# Patient Record
Sex: FEMALE | Race: WHITE | NOT HISPANIC OR LATINO | Employment: UNEMPLOYED | URBAN - METROPOLITAN AREA
[De-identification: names, ages, dates, MRNs, and addresses within clinical notes are randomized per-mention and may not be internally consistent; named-entity substitution may affect disease eponyms.]

---

## 2017-04-08 DIAGNOSIS — I10 ESSENTIAL HYPERTENSION: ICD-10-CM

## 2017-04-10 RX ORDER — HYDROCHLOROTHIAZIDE 25 MG/1
TABLET ORAL
Qty: 30 TABLET | Refills: 5 | Status: SHIPPED | OUTPATIENT
Start: 2017-04-10 | End: 2017-08-11 | Stop reason: SDUPTHER

## 2017-04-12 ENCOUNTER — LAB VISIT (OUTPATIENT)
Dept: LAB | Facility: HOSPITAL | Age: 47
End: 2017-04-12
Attending: INTERNAL MEDICINE

## 2017-04-12 DIAGNOSIS — I10 ESSENTIAL HYPERTENSION: ICD-10-CM

## 2017-04-12 LAB
ALBUMIN SERPL BCP-MCNC: 4.1 G/DL
ALP SERPL-CCNC: 90 U/L
ALT SERPL W/O P-5'-P-CCNC: 21 U/L
ANION GAP SERPL CALC-SCNC: 9 MMOL/L
AST SERPL-CCNC: 18 U/L
BASOPHILS # BLD AUTO: 0.05 K/UL
BASOPHILS NFR BLD: 0.7 %
BILIRUB SERPL-MCNC: 0.6 MG/DL
BUN SERPL-MCNC: 14 MG/DL
CALCIUM SERPL-MCNC: 9.8 MG/DL
CHLORIDE SERPL-SCNC: 101 MMOL/L
CHOLEST/HDLC SERPL: 3.1 {RATIO}
CO2 SERPL-SCNC: 29 MMOL/L
CREAT SERPL-MCNC: 1 MG/DL
DIFFERENTIAL METHOD: ABNORMAL
EOSINOPHIL # BLD AUTO: 0.2 K/UL
EOSINOPHIL NFR BLD: 2.3 %
ERYTHROCYTE [DISTWIDTH] IN BLOOD BY AUTOMATED COUNT: 14.3 %
EST. GFR  (AFRICAN AMERICAN): >60 ML/MIN/1.73 M^2
EST. GFR  (NON AFRICAN AMERICAN): >60 ML/MIN/1.73 M^2
GLUCOSE SERPL-MCNC: 104 MG/DL
HCT VFR BLD AUTO: 41.8 %
HDL/CHOLESTEROL RATIO: 32.1 %
HDLC SERPL-MCNC: 165 MG/DL
HDLC SERPL-MCNC: 53 MG/DL
HGB BLD-MCNC: 14.2 G/DL
LDLC SERPL CALC-MCNC: 100 MG/DL
LYMPHOCYTES # BLD AUTO: 1.8 K/UL
LYMPHOCYTES NFR BLD: 26.1 %
MCH RBC QN AUTO: 27.8 PG
MCHC RBC AUTO-ENTMCNC: 34 %
MCV RBC AUTO: 82 FL
MONOCYTES # BLD AUTO: 0.5 K/UL
MONOCYTES NFR BLD: 6.9 %
NEUTROPHILS # BLD AUTO: 4.5 K/UL
NEUTROPHILS NFR BLD: 64 %
NONHDLC SERPL-MCNC: 112 MG/DL
PLATELET # BLD AUTO: 400 K/UL
PMV BLD AUTO: 9.9 FL
POTASSIUM SERPL-SCNC: 3.8 MMOL/L
PROT SERPL-MCNC: 8.1 G/DL
RBC # BLD AUTO: 5.11 M/UL
SODIUM SERPL-SCNC: 139 MMOL/L
TRIGL SERPL-MCNC: 60 MG/DL
TSH SERPL DL<=0.005 MIU/L-ACNC: 2.96 UIU/ML
WBC # BLD AUTO: 6.96 K/UL

## 2017-04-12 PROCEDURE — 80053 COMPREHEN METABOLIC PANEL: CPT

## 2017-04-12 PROCEDURE — 84443 ASSAY THYROID STIM HORMONE: CPT

## 2017-04-12 PROCEDURE — 85025 COMPLETE CBC W/AUTO DIFF WBC: CPT

## 2017-04-12 PROCEDURE — 80061 LIPID PANEL: CPT

## 2017-04-12 PROCEDURE — 36415 COLL VENOUS BLD VENIPUNCTURE: CPT

## 2017-04-19 ENCOUNTER — OFFICE VISIT (OUTPATIENT)
Dept: INTERNAL MEDICINE | Facility: CLINIC | Age: 47
End: 2017-04-19
Payer: COMMERCIAL

## 2017-04-19 VITALS
RESPIRATION RATE: 16 BRPM | HEART RATE: 96 BPM | WEIGHT: 213.63 LBS | SYSTOLIC BLOOD PRESSURE: 118 MMHG | HEIGHT: 67 IN | OXYGEN SATURATION: 96 % | DIASTOLIC BLOOD PRESSURE: 82 MMHG | BODY MASS INDEX: 33.53 KG/M2

## 2017-04-19 DIAGNOSIS — N76.3 CHRONIC VULVITIS: ICD-10-CM

## 2017-04-19 DIAGNOSIS — R21 RASH: ICD-10-CM

## 2017-04-19 DIAGNOSIS — I10 ESSENTIAL HYPERTENSION: Primary | ICD-10-CM

## 2017-04-19 DIAGNOSIS — E66.9 OBESITY (BMI 30-39.9): ICD-10-CM

## 2017-04-19 PROCEDURE — 99214 OFFICE O/P EST MOD 30 MIN: CPT | Mod: S$GLB,,, | Performed by: INTERNAL MEDICINE

## 2017-04-19 PROCEDURE — 1160F RVW MEDS BY RX/DR IN RCRD: CPT | Mod: S$GLB,,, | Performed by: INTERNAL MEDICINE

## 2017-04-19 PROCEDURE — 3074F SYST BP LT 130 MM HG: CPT | Mod: S$GLB,,, | Performed by: INTERNAL MEDICINE

## 2017-04-19 PROCEDURE — 99999 PR PBB SHADOW E&M-EST. PATIENT-LVL III: CPT | Mod: PBBFAC,,, | Performed by: INTERNAL MEDICINE

## 2017-04-19 PROCEDURE — 3079F DIAST BP 80-89 MM HG: CPT | Mod: S$GLB,,, | Performed by: INTERNAL MEDICINE

## 2017-04-19 RX ORDER — CLOTRIMAZOLE AND BETAMETHASONE DIPROPIONATE 10; .5 MG/ML; MG/ML
LOTION TOPICAL 2 TIMES DAILY
Qty: 30 ML | Refills: 2 | Status: SHIPPED | OUTPATIENT
Start: 2017-04-19 | End: 2018-05-17 | Stop reason: SDUPTHER

## 2017-04-19 NOTE — PROGRESS NOTES
Subjective:       Patient ID: Rocio Arriaga is a 46 y.o. female.    Chief Complaint: Hypertension (follow up with lab review)    HPI Comments: Rocio Arriaga is a 46 y.o. female who presents forHypertension,  follow up.   Last seen more than 2 yrs ago .  Reviewed  labs      Hypertension   This is a chronic problem. The current episode started more than 1 year ago. The problem is controlled. Pertinent negatives include no blurred vision, chest pain, neck pain, orthopnea, palpitations, PND or shortness of breath. Past treatments include diuretics. The current treatment provides moderate improvement.     Review of Systems   Constitutional: Negative for chills and fever.   HENT: Negative for congestion, hearing loss, sinus pressure and sore throat.    Eyes: Negative for blurred vision and photophobia.   Respiratory: Negative for cough, choking, chest tightness, shortness of breath and wheezing.    Cardiovascular: Negative for chest pain, palpitations, orthopnea and PND.   Gastrointestinal: Negative for blood in stool, nausea and vomiting.   Endocrine: Negative for polyphagia.   Genitourinary: Negative for dysuria and hematuria.        Polydipsia.   Musculoskeletal: Negative for arthralgias, myalgias and neck pain.   Skin: Negative for pallor.   Neurological: Negative for numbness.   Hematological: Does not bruise/bleed easily.   Psychiatric/Behavioral: Negative for suicidal ideas. The patient is not nervous/anxious.        Objective:      Physical Exam   Constitutional: She is oriented to person, place, and time. She appears well-developed and well-nourished.   HENT:   Head: Normocephalic and atraumatic.   Right Ear: External ear normal.   Left Ear: External ear normal.   Mouth/Throat: Oropharynx is clear and moist.   Eyes: Conjunctivae and EOM are normal. Pupils are equal, round, and reactive to light.   Neck: Normal range of motion. Neck supple. No JVD present. No tracheal deviation present. No thyromegaly present.    Cardiovascular: Normal rate, regular rhythm, normal heart sounds and intact distal pulses.    Pulmonary/Chest: Effort normal and breath sounds normal. No respiratory distress. She has no wheezes. She has no rales. She exhibits no tenderness.   Abdominal: Soft. Bowel sounds are normal. She exhibits no distension and no mass. There is no tenderness. There is no rebound and no guarding.   Musculoskeletal: Normal range of motion. She exhibits no edema.   Right ankle swells up    Lymphadenopathy:     She has no cervical adenopathy.   Neurological: She is alert and oriented to person, place, and time. She has normal reflexes. No cranial nerve deficit. She exhibits normal muscle tone. Coordination normal.   Skin: Skin is warm and dry.   Scaly rash scalp ;vulva, abdomen   Asking for Lotrisone lotion      Psychiatric: She has a normal mood and affect.   Nursing note and vitals reviewed.      Assessment:       1. Essential hypertension    2. Obesity (BMI 30-39.9)    3. Chronic vulvitis    4. Rash        Plan:   Rocio was seen today for hypertension.    Diagnoses and all orders for this visit:    Essential hypertension  -     CBC auto differential; Future  -     Comprehensive metabolic panel; Future  -     Lipid panel; Future  -     TSH; Future    Well controlled.  Continue same medication and dose.  1. Keep weight close to ideal body weight.   2.   Avoid high salt foods (olives, pickles, smoked meats, salted potato chips, etc.).   Do not add salt to your food at the table.   Use only small amounts of salt when cooking.   3. Begin an exercise program. Discuss with your doctor what type of exercise program would be best for you. It doesn't have to be difficult. Even brisk walking for 20 minutes three times a week is a good form of exercise.   4. Avoid medicines which contain heart stimulants. This includes many cold and sinus decongestant pills and sprays as well as diet pills. Check the warnings about hypertension on the  label. Stimulants such as amphetamine or cocaine could be lethal for someone with hypertension. Never take these.    Obesity (BMI 30-39.9)    # The patient is asked to make an attempt to improve diet and exercise patterns to aid in medical management of this problem.     # Eat  5 small meals a day.     # Cut out high carbohydrate  foods : bread, rice, pasta, potatoes.     # Exercise/walk 5x/week for at least 30-45  minutes.        Chronic vulvitis  -     clotrimazole-betamethasone (LOTRISONE) lotion; Apply topically 2 (two) times daily.    Rash  -     Ambulatory Referral to Dermatology

## 2017-04-19 NOTE — MR AVS SNAPSHOT
[facility]  [address]  [place] [address]  Phone: [phone]  Fax: [phone]                  Rocio Arriaga   2017 1:45 PM   Office Visit    Description:  Female : 1970   Provider:  Qian Robin MD   Department:  [facility]           Reason for Visit     Hypertension           Diagnoses this Visit        Comments    Essential hypertension    -  Primary     Obesity (BMI 30-39.9)         Chronic vulvitis         Rash                To Do List           Goals (5 Years of Data)     None      Follow-Up and Disposition     Return in about 6 months (around 10/19/2017).       These Medications        Disp Refills Start End    clotrimazole-betamethasone (LOTRISONE) lotion 30 mL 2 2017     Apply topically 2 (two) times daily. - Topical (Top)    Pharmacy: [facility]/pharmacy #[id number]  [place] - [address] [facility] [address]  #: [phone]         [facility]     [facility] -  Assistance  Unless otherwise directed by your provider, please contact [facility], our nurse care line that is available for  assistance.     Registered nurses in the [facility] provide: appointment scheduling, clinical advisement, health education, and other advisory services.  Call: [phone] (toll free)               Medications           Message regarding Medications     Verify the changes and/or additions to your medication regime listed below are the same as discussed with your clinician today.  If any of these changes or additions are incorrect, please notify your healthcare provider.             Verify that the below list of medications is an accurate representation of the medications you are currently taking.  If none reported, the list may be blank. If incorrect, please contact your healthcare provider. Carry this list with you in case of emergency.           Current Medications     clotrimazole-betamethasone (LOTRISONE)  "lotion Apply topically 2 (two) times daily.    hydrochlorothiazide (HYDRODIURIL) 25 MG tablet TAKE 1 TABLET (25 MG TOTAL) BY MOUTH ONCE DAILY.           Clinical Reference Information           Your Vitals Were     BP Pulse Resp Height Weight SpO2    118/82 96 16 5' 7" (1.702 m) 96.9 kg (213 lb 10 oz) 96%    BMI                33.46 kg/m2          Blood Pressure          Most Recent Value    BP  118/82      Allergies as of 4/19/2017     No Known Allergies      Immunizations Administered on Date of Encounter - 4/19/2017     None      Orders Placed During Today's Visit      Normal Orders This Visit    Ambulatory Referral to Dermatology     Future Labs/Procedures Expected by Expires    CBC auto differential  10/16/2017 (Approximate) 4/19/2018    Comprehensive metabolic panel  10/16/2017 (Approximate) 4/19/2018    Lipid panel  10/16/2017 (Approximate) 4/19/2018    TSH  10/16/2017 (Approximate) 4/19/2018      Language Assistance Services     ATTENTION: Language assistance services are available, free of charge. Please call [phone].      ATENCIÓN: Si habla bert, tiene a cox disposición servicios gratuitos de asistencia lingüística. Llame al [phone].     RADHA Ý: N?u b?n nói Ti?ng Vi?t, có các d?ch v? h? tr? ngôn ng? mi?n phí dành cho b?n. G?i s? [phone].         [facility] complies with applicable Federal civil rights laws and does not discriminate on the basis of race, color, national origin, age, disability, or sex.        "

## 2017-08-11 DIAGNOSIS — I10 ESSENTIAL HYPERTENSION: ICD-10-CM

## 2017-08-14 RX ORDER — HYDROCHLOROTHIAZIDE 25 MG/1
TABLET ORAL
Qty: 30 TABLET | Refills: 5 | Status: SHIPPED | OUTPATIENT
Start: 2017-08-14 | End: 2018-06-03 | Stop reason: SDUPTHER

## 2018-02-21 ENCOUNTER — OFFICE VISIT (OUTPATIENT)
Dept: INTERNAL MEDICINE | Facility: CLINIC | Age: 48
End: 2018-02-21
Payer: COMMERCIAL

## 2018-02-21 ENCOUNTER — HOSPITAL ENCOUNTER (OUTPATIENT)
Dept: RADIOLOGY | Facility: HOSPITAL | Age: 48
Discharge: HOME | End: 2018-02-21
Attending: INTERNAL MEDICINE
Payer: COMMERCIAL

## 2018-02-21 VITALS
RESPIRATION RATE: 16 BRPM | HEIGHT: 67 IN | SYSTOLIC BLOOD PRESSURE: 126 MMHG | OXYGEN SATURATION: 99 % | WEIGHT: 207.44 LBS | DIASTOLIC BLOOD PRESSURE: 78 MMHG | HEART RATE: 84 BPM | BODY MASS INDEX: 32.56 KG/M2

## 2018-02-21 DIAGNOSIS — M25.569 RECURRENT KNEE PAIN, UNSPECIFIED LATERALITY: ICD-10-CM

## 2018-02-21 DIAGNOSIS — Z00.00 WELL ADULT EXAM: Primary | ICD-10-CM

## 2018-02-21 PROCEDURE — 99999 PR PBB SHADOW E&M-EST. PATIENT-LVL III: CPT | Mod: PBBFAC,,, | Performed by: INTERNAL MEDICINE

## 2018-02-21 PROCEDURE — 73560 X-RAY EXAM OF KNEE 1 OR 2: CPT | Mod: 26,RT,, | Performed by: RADIOLOGY

## 2018-02-21 PROCEDURE — 99213 OFFICE O/P EST LOW 20 MIN: CPT | Mod: S$GLB,,, | Performed by: INTERNAL MEDICINE

## 2018-02-21 PROCEDURE — 73560 X-RAY EXAM OF KNEE 1 OR 2: CPT | Mod: TC,RT

## 2018-02-21 PROCEDURE — 3008F BODY MASS INDEX DOCD: CPT | Mod: S$GLB,,, | Performed by: INTERNAL MEDICINE

## 2018-02-21 RX ORDER — MELOXICAM 15 MG/1
15 TABLET ORAL DAILY
Qty: 30 TABLET | Refills: 0 | Status: SHIPPED | OUTPATIENT
Start: 2018-02-21 | End: 2018-03-26 | Stop reason: SDUPTHER

## 2018-02-21 NOTE — PROGRESS NOTES
Subjective:       Patient ID: Rocio Arriaga is a 47 y.o. female.    Chief Complaint: Annual Exam; Joint Swelling (right knee); and Hypertension    Rocio Arriaga is a 47 y.o. female  Here for annual  Exam          Hypertension   This is a chronic problem. The current episode started more than 1 year ago. The problem is unchanged. The problem is controlled. Associated symptoms include headaches. Pertinent negatives include no chest pain, neck pain or palpitations. Past treatments include diuretics. The current treatment provides mild improvement.   Knee Pain    The incident occurred more than 1 week ago (last 2 weeks). There was no injury mechanism. The pain is present in the right knee. The pain is at a severity of 2/10. The pain is mild. Associated symptoms include an inability to bear weight. The symptoms are aggravated by palpation. She has tried acetaminophen for the symptoms. The treatment provided mild relief.     Review of Systems   Constitutional: Negative for activity change and unexpected weight change.   HENT: Negative for hearing loss, rhinorrhea and trouble swallowing.    Eyes: Negative for discharge and visual disturbance.   Respiratory: Negative for chest tightness and wheezing.    Cardiovascular: Negative for chest pain and palpitations.   Gastrointestinal: Negative for blood in stool, constipation, diarrhea and vomiting.   Endocrine: Negative for polydipsia and polyuria.   Genitourinary: Negative for difficulty urinating, dysuria, hematuria and menstrual problem.   Musculoskeletal: Positive for arthralgias and joint swelling. Negative for neck pain.   Neurological: Positive for headaches. Negative for weakness.   Psychiatric/Behavioral: Positive for dysphoric mood. Negative for confusion.       Objective:      Physical Exam   Constitutional: She is oriented to person, place, and time. She appears well-developed and well-nourished.   HENT:   Head: Normocephalic and atraumatic.   Right Ear:  External ear normal.   Left Ear: External ear normal.   Mouth/Throat: Oropharynx is clear and moist.   Eyes: Conjunctivae and EOM are normal. Pupils are equal, round, and reactive to light.   Neck: Normal range of motion. Neck supple. No JVD present. No tracheal deviation present. No thyromegaly present.   Cardiovascular: Normal rate, regular rhythm, normal heart sounds and intact distal pulses.    Pulmonary/Chest: Effort normal and breath sounds normal. No respiratory distress. She has no wheezes. She has no rales. She exhibits no tenderness.   Abdominal: Soft. Bowel sounds are normal. She exhibits no distension and no mass. There is no tenderness. There is no rebound and no guarding.   Musculoskeletal: Normal range of motion. She exhibits no edema.        Right knee: She exhibits swelling and bony tenderness. She exhibits no erythema. Tenderness found.   Right ankle swells up    Lymphadenopathy:     She has no cervical adenopathy.   Neurological: She is alert and oriented to person, place, and time. She has normal reflexes. No cranial nerve deficit. She exhibits normal muscle tone. Coordination normal.   Skin: Skin is warm and dry.   Psychiatric: She has a normal mood and affect.   Nursing note and vitals reviewed.      Assessment:       1. Well adult exam        Plan:   Rocio was seen today for annual exam, joint swelling and hypertension.    Diagnoses and all orders for this visit:    Well adult exam  -     CBC auto differential; Future  -     Comprehensive metabolic panel; Future  -     TSH; Future  -     Lipid panel; Future  -     Urinalysis; Future    Recurrent knee pain, unspecified laterality  -     X-Ray Knee 1 or 2 View Right; Future  -     meloxicam (MOBIC) 15 MG tablet; Take 1 tablet (15 mg total) by mouth once daily.    if not better see orthopedics  Loose weight

## 2018-02-28 ENCOUNTER — LAB VISIT (OUTPATIENT)
Dept: LAB | Facility: HOSPITAL | Age: 48
End: 2018-02-28
Attending: INTERNAL MEDICINE
Payer: COMMERCIAL

## 2018-02-28 DIAGNOSIS — Z00.00 WELL ADULT EXAM: ICD-10-CM

## 2018-02-28 LAB
ALBUMIN SERPL BCP-MCNC: 3.6 G/DL
ALP SERPL-CCNC: 78 U/L
ALT SERPL W/O P-5'-P-CCNC: 15 U/L
ANION GAP SERPL CALC-SCNC: 9 MMOL/L
AST SERPL-CCNC: 16 U/L
BASOPHILS # BLD AUTO: 0.05 K/UL
BASOPHILS NFR BLD: 0.8 %
BILIRUB SERPL-MCNC: 0.6 MG/DL
BILIRUB UR QL STRIP: NEGATIVE
BUN SERPL-MCNC: 21 MG/DL
CALCIUM SERPL-MCNC: 9.6 MG/DL
CHLORIDE SERPL-SCNC: 103 MMOL/L
CHOLEST SERPL-MCNC: 147 MG/DL
CHOLEST/HDLC SERPL: 3.5 {RATIO}
CLARITY UR: CLEAR
CO2 SERPL-SCNC: 29 MMOL/L
COLOR UR: YELLOW
CREAT SERPL-MCNC: 0.9 MG/DL
DIFFERENTIAL METHOD: ABNORMAL
EOSINOPHIL # BLD AUTO: 0.2 K/UL
EOSINOPHIL NFR BLD: 2.3 %
ERYTHROCYTE [DISTWIDTH] IN BLOOD BY AUTOMATED COUNT: 14 %
EST. GFR  (AFRICAN AMERICAN): >60 ML/MIN/1.73 M^2
EST. GFR  (NON AFRICAN AMERICAN): >60 ML/MIN/1.73 M^2
GLUCOSE SERPL-MCNC: 108 MG/DL
GLUCOSE UR QL STRIP: NEGATIVE
HCT VFR BLD AUTO: 39 %
HDLC SERPL-MCNC: 42 MG/DL
HDLC SERPL: 28.6 %
HGB BLD-MCNC: 13 G/DL
HGB UR QL STRIP: ABNORMAL
KETONES UR QL STRIP: NEGATIVE
LDLC SERPL CALC-MCNC: 93.6 MG/DL
LEUKOCYTE ESTERASE UR QL STRIP: NEGATIVE
LYMPHOCYTES # BLD AUTO: 1.8 K/UL
LYMPHOCYTES NFR BLD: 27.2 %
MCH RBC QN AUTO: 28.4 PG
MCHC RBC AUTO-ENTMCNC: 33.3 G/DL
MCV RBC AUTO: 85 FL
MONOCYTES # BLD AUTO: 0.6 K/UL
MONOCYTES NFR BLD: 9.3 %
NEUTROPHILS # BLD AUTO: 3.9 K/UL
NEUTROPHILS NFR BLD: 60.4 %
NITRITE UR QL STRIP: NEGATIVE
NONHDLC SERPL-MCNC: 105 MG/DL
PH UR STRIP: 6 [PH] (ref 5–8)
PLATELET # BLD AUTO: 381 K/UL
PMV BLD AUTO: 9.8 FL
POTASSIUM SERPL-SCNC: 4.3 MMOL/L
PROT SERPL-MCNC: 7.2 G/DL
PROT UR QL STRIP: NEGATIVE
RBC # BLD AUTO: 4.57 M/UL
SODIUM SERPL-SCNC: 141 MMOL/L
SP GR UR STRIP: 1.02 (ref 1–1.03)
TRIGL SERPL-MCNC: 57 MG/DL
TSH SERPL DL<=0.005 MIU/L-ACNC: 2.9 UIU/ML
URN SPEC COLLECT METH UR: ABNORMAL
UROBILINOGEN UR STRIP-ACNC: NEGATIVE EU/DL
WBC # BLD AUTO: 6.48 K/UL

## 2018-02-28 PROCEDURE — 84443 ASSAY THYROID STIM HORMONE: CPT

## 2018-02-28 PROCEDURE — 36415 COLL VENOUS BLD VENIPUNCTURE: CPT

## 2018-02-28 PROCEDURE — 80053 COMPREHEN METABOLIC PANEL: CPT

## 2018-02-28 PROCEDURE — 85025 COMPLETE CBC W/AUTO DIFF WBC: CPT

## 2018-02-28 PROCEDURE — 81003 URINALYSIS AUTO W/O SCOPE: CPT

## 2018-02-28 PROCEDURE — 80061 LIPID PANEL: CPT

## 2018-03-26 DIAGNOSIS — M25.569 RECURRENT KNEE PAIN, UNSPECIFIED LATERALITY: ICD-10-CM

## 2018-03-26 RX ORDER — MELOXICAM 15 MG/1
15 TABLET ORAL DAILY
Qty: 30 TABLET | Refills: 0 | Status: SHIPPED | OUTPATIENT
Start: 2018-03-26 | End: 2019-04-01

## 2018-05-17 DIAGNOSIS — N76.3 CHRONIC VULVITIS: ICD-10-CM

## 2018-05-17 RX ORDER — CLOTRIMAZOLE AND BETAMETHASONE DIPROPIONATE 10; .5 MG/ML; MG/ML
LOTION TOPICAL 2 TIMES DAILY
Qty: 30 ML | Refills: 2 | Status: SHIPPED | OUTPATIENT
Start: 2018-05-17 | End: 2019-08-10 | Stop reason: SDUPTHER

## 2018-06-03 DIAGNOSIS — I10 ESSENTIAL HYPERTENSION: ICD-10-CM

## 2018-06-04 RX ORDER — HYDROCHLOROTHIAZIDE 25 MG/1
TABLET ORAL
Qty: 30 TABLET | Refills: 5 | Status: SHIPPED | OUTPATIENT
Start: 2018-06-04 | End: 2018-08-22 | Stop reason: SDUPTHER

## 2018-08-22 DIAGNOSIS — I10 ESSENTIAL HYPERTENSION: ICD-10-CM

## 2018-08-22 RX ORDER — HYDROCHLOROTHIAZIDE 25 MG/1
25 TABLET ORAL DAILY
Qty: 30 TABLET | Refills: 5 | Status: SHIPPED | OUTPATIENT
Start: 2018-08-22 | End: 2019-02-27 | Stop reason: SDUPTHER

## 2018-11-02 DIAGNOSIS — Z12.39 BREAST CANCER SCREENING: ICD-10-CM

## 2019-02-27 DIAGNOSIS — I10 ESSENTIAL HYPERTENSION: ICD-10-CM

## 2019-02-27 RX ORDER — HYDROCHLOROTHIAZIDE 25 MG/1
25 TABLET ORAL DAILY
Qty: 30 TABLET | Refills: 0 | Status: SHIPPED | OUTPATIENT
Start: 2019-02-27 | End: 2019-04-01 | Stop reason: SDUPTHER

## 2019-04-01 ENCOUNTER — OFFICE VISIT (OUTPATIENT)
Dept: INTERNAL MEDICINE | Facility: CLINIC | Age: 49
End: 2019-04-01

## 2019-04-01 VITALS
HEART RATE: 84 BPM | RESPIRATION RATE: 14 BRPM | BODY MASS INDEX: 35.36 KG/M2 | DIASTOLIC BLOOD PRESSURE: 68 MMHG | WEIGHT: 225.31 LBS | OXYGEN SATURATION: 97 % | SYSTOLIC BLOOD PRESSURE: 122 MMHG | HEIGHT: 67 IN

## 2019-04-01 DIAGNOSIS — I10 ESSENTIAL HYPERTENSION: Primary | ICD-10-CM

## 2019-04-01 DIAGNOSIS — R00.2 PALPITATION: ICD-10-CM

## 2019-04-01 PROCEDURE — 99213 OFFICE O/P EST LOW 20 MIN: CPT | Mod: S$PBB,,, | Performed by: INTERNAL MEDICINE

## 2019-04-01 PROCEDURE — 99999 PR PBB SHADOW E&M-EST. PATIENT-LVL III: CPT | Mod: PBBFAC,,, | Performed by: INTERNAL MEDICINE

## 2019-04-01 PROCEDURE — 99213 PR OFFICE/OUTPT VISIT, EST, LEVL III, 20-29 MIN: ICD-10-PCS | Mod: S$PBB,,, | Performed by: INTERNAL MEDICINE

## 2019-04-01 PROCEDURE — 99999 PR PBB SHADOW E&M-EST. PATIENT-LVL III: ICD-10-PCS | Mod: PBBFAC,,, | Performed by: INTERNAL MEDICINE

## 2019-04-01 PROCEDURE — 99213 OFFICE O/P EST LOW 20 MIN: CPT | Mod: PBBFAC | Performed by: INTERNAL MEDICINE

## 2019-04-01 RX ORDER — ATENOLOL 25 MG/1
25 TABLET ORAL DAILY
Qty: 30 TABLET | Refills: 11 | Status: SHIPPED | OUTPATIENT
Start: 2019-04-01 | End: 2019-05-08 | Stop reason: SDUPTHER

## 2019-04-01 RX ORDER — HYDROCHLOROTHIAZIDE 25 MG/1
25 TABLET ORAL DAILY
Qty: 30 TABLET | Refills: 11 | Status: SHIPPED | OUTPATIENT
Start: 2019-04-01 | End: 2020-05-05

## 2019-04-01 NOTE — PROGRESS NOTES
Subjective:       Patient ID: Rocio Arriaga is a 48 y.o. female.    Chief Complaint: Hypertension    She reports some high BPs and palpitations   She is taking her HCTZ .      Hypertension   This is a recurrent problem. The current episode started more than 1 year ago. The problem has been waxing and waning since onset. The problem is resistant. Associated symptoms include anxiety, headaches, malaise/fatigue, neck pain, palpitations, PND and shortness of breath. Pertinent negatives include no blurred vision, chest pain, orthopnea, peripheral edema or sweats. There are no associated agents to hypertension. Risk factors for coronary artery disease include obesity, sedentary lifestyle and stress. Past treatments include diuretics. The current treatment provides moderate improvement. Compliance problems include exercise.      Review of Systems   Constitutional: Positive for malaise/fatigue. Negative for activity change and unexpected weight change.   HENT: Negative for hearing loss, rhinorrhea and trouble swallowing.    Eyes: Negative for blurred vision, discharge and visual disturbance.   Respiratory: Positive for shortness of breath. Negative for chest tightness and wheezing.    Cardiovascular: Positive for palpitations and PND. Negative for chest pain and orthopnea.   Gastrointestinal: Negative for blood in stool, constipation, diarrhea and vomiting.   Endocrine: Negative for polydipsia and polyuria.   Genitourinary: Negative for difficulty urinating, dysuria, hematuria and menstrual problem.   Musculoskeletal: Positive for arthralgias, joint swelling and neck pain.   Neurological: Positive for headaches. Negative for weakness.   Psychiatric/Behavioral: Positive for dysphoric mood. Negative for confusion.       Objective:      Physical Exam   Constitutional: She is oriented to person, place, and time. She appears well-developed and well-nourished.   HENT:   Head: Normocephalic and atraumatic.   Right Ear: External  ear normal.   Left Ear: External ear normal.   Mouth/Throat: Oropharynx is clear and moist.   Eyes: Pupils are equal, round, and reactive to light. Conjunctivae and EOM are normal.   Neck: Normal range of motion. Neck supple. No JVD present. No tracheal deviation present. No thyromegaly present.   Cardiovascular: Normal rate, regular rhythm, normal heart sounds and intact distal pulses.   Pulmonary/Chest: Effort normal and breath sounds normal. No respiratory distress. She has no wheezes. She has no rales. She exhibits no tenderness.   Abdominal: Soft. Bowel sounds are normal. She exhibits no distension and no mass. There is no tenderness. There is no rebound and no guarding.   Musculoskeletal: Normal range of motion. She exhibits no edema.        Right knee: She exhibits swelling and bony tenderness. She exhibits no erythema. Tenderness found.   Right ankle swells up    Lymphadenopathy:     She has no cervical adenopathy.   Neurological: She is alert and oriented to person, place, and time. She has normal reflexes. No cranial nerve deficit. She exhibits normal muscle tone. Coordination normal.   Skin: Skin is warm and dry.   Psychiatric: She has a normal mood and affect.   Nursing note and vitals reviewed.      Assessment:       1. Essential hypertension    2. Palpitation        Plan:   Rocio was seen today for hypertension.    Diagnoses and all orders for this visit:    Essential hypertension  -     atenolol (TENORMIN) 25 MG tablet; Take 1 tablet (25 mg total) by mouth once daily.  -     hydroCHLOROthiazide (HYDRODIURIL) 25 MG tablet; Take 1 tablet (25 mg total) by mouth once daily.    Palpitation  -     atenolol (TENORMIN) 25 MG tablet; Take 1 tablet (25 mg total) by mouth once daily.      Well controlled.  Continue same medication and dose.  1. Keep weight close to ideal body weight.   2.   Avoid high salt foods (olives, pickles, smoked meats, salted potato chips, etc.).   Do not add salt to your food at the  table.   Use only small amounts of salt when cooking.   3. Begin an exercise program. Discuss with your doctor what type of exercise program would be best for you. It doesn't have to be difficult. Even brisk walking for 20 minutes three times a week is a good form of exercise.   4. Avoid medicines which contain heart stimulants. This includes many cold and sinus decongestant pills and sprays as well as diet pills. Check the warnings about hypertension on the label. Stimulants such as amphetamine or cocaine could be lethal for someone with hypertension. Never take these.    Problem List Items Addressed This Visit     None

## 2019-05-08 DIAGNOSIS — I10 ESSENTIAL HYPERTENSION: ICD-10-CM

## 2019-05-08 DIAGNOSIS — R00.2 PALPITATION: ICD-10-CM

## 2019-05-08 RX ORDER — ATENOLOL 25 MG/1
TABLET ORAL
Qty: 30 TABLET | Refills: 0 | Status: SHIPPED | OUTPATIENT
Start: 2019-05-08 | End: 2019-10-07

## 2019-08-10 DIAGNOSIS — N76.3 CHRONIC VULVITIS: ICD-10-CM

## 2019-08-12 RX ORDER — CLOTRIMAZOLE AND BETAMETHASONE DIPROPIONATE 10; .5 MG/ML; MG/ML
LOTION TOPICAL
Qty: 30 ML | Refills: 1 | Status: SHIPPED | OUTPATIENT
Start: 2019-08-12 | End: 2021-05-12 | Stop reason: SDUPTHER

## 2019-09-30 ENCOUNTER — LAB VISIT (OUTPATIENT)
Dept: LAB | Facility: HOSPITAL | Age: 49
End: 2019-09-30
Attending: INTERNAL MEDICINE
Payer: MEDICAID

## 2019-09-30 DIAGNOSIS — R00.2 PALPITATION: ICD-10-CM

## 2019-09-30 DIAGNOSIS — I10 ESSENTIAL HYPERTENSION: ICD-10-CM

## 2019-09-30 LAB
ALBUMIN SERPL BCP-MCNC: 3.9 G/DL (ref 3.5–5.2)
ALP SERPL-CCNC: 91 U/L (ref 55–135)
ALT SERPL W/O P-5'-P-CCNC: 27 U/L (ref 10–44)
ANION GAP SERPL CALC-SCNC: 12 MMOL/L (ref 8–16)
AST SERPL-CCNC: 23 U/L (ref 10–40)
BASOPHILS # BLD AUTO: 0.03 K/UL (ref 0–0.2)
BASOPHILS NFR BLD: 0.5 % (ref 0–1.9)
BILIRUB SERPL-MCNC: 0.5 MG/DL (ref 0.1–1)
BUN SERPL-MCNC: 14 MG/DL (ref 6–20)
CALCIUM SERPL-MCNC: 9.7 MG/DL (ref 8.7–10.5)
CHLORIDE SERPL-SCNC: 102 MMOL/L (ref 95–110)
CHOLEST SERPL-MCNC: 178 MG/DL (ref 120–199)
CHOLEST/HDLC SERPL: 3.3 {RATIO} (ref 2–5)
CO2 SERPL-SCNC: 28 MMOL/L (ref 23–29)
CREAT SERPL-MCNC: 0.9 MG/DL (ref 0.5–1.4)
DIFFERENTIAL METHOD: ABNORMAL
EOSINOPHIL # BLD AUTO: 0.2 K/UL (ref 0–0.5)
EOSINOPHIL NFR BLD: 2.6 % (ref 0–8)
ERYTHROCYTE [DISTWIDTH] IN BLOOD BY AUTOMATED COUNT: 13.9 % (ref 11.5–14.5)
EST. GFR  (AFRICAN AMERICAN): >60 ML/MIN/1.73 M^2
EST. GFR  (NON AFRICAN AMERICAN): >60 ML/MIN/1.73 M^2
GLUCOSE SERPL-MCNC: 114 MG/DL (ref 70–110)
HCT VFR BLD AUTO: 42.3 % (ref 37–48.5)
HDLC SERPL-MCNC: 54 MG/DL (ref 40–75)
HDLC SERPL: 30.3 % (ref 20–50)
HGB BLD-MCNC: 13.5 G/DL (ref 12–16)
IMM GRANULOCYTES # BLD AUTO: 0.02 K/UL (ref 0–0.04)
IMM GRANULOCYTES NFR BLD AUTO: 0.3 % (ref 0–0.5)
LDLC SERPL CALC-MCNC: 108 MG/DL (ref 63–159)
LYMPHOCYTES # BLD AUTO: 1.5 K/UL (ref 1–4.8)
LYMPHOCYTES NFR BLD: 25 % (ref 18–48)
MCH RBC QN AUTO: 27.3 PG (ref 27–31)
MCHC RBC AUTO-ENTMCNC: 31.9 G/DL (ref 32–36)
MCV RBC AUTO: 86 FL (ref 82–98)
MONOCYTES # BLD AUTO: 0.4 K/UL (ref 0.3–1)
MONOCYTES NFR BLD: 7.1 % (ref 4–15)
NEUTROPHILS # BLD AUTO: 4 K/UL (ref 1.8–7.7)
NEUTROPHILS NFR BLD: 64.5 % (ref 38–73)
NONHDLC SERPL-MCNC: 124 MG/DL
NRBC BLD-RTO: 0 /100 WBC
PLATELET # BLD AUTO: 389 K/UL (ref 150–350)
PMV BLD AUTO: 9.8 FL (ref 9.2–12.9)
POTASSIUM SERPL-SCNC: 3.7 MMOL/L (ref 3.5–5.1)
PROT SERPL-MCNC: 7.8 G/DL (ref 6–8.4)
RBC # BLD AUTO: 4.94 M/UL (ref 4–5.4)
SODIUM SERPL-SCNC: 142 MMOL/L (ref 136–145)
TRIGL SERPL-MCNC: 80 MG/DL (ref 30–150)
TSH SERPL DL<=0.005 MIU/L-ACNC: 2.53 UIU/ML (ref 0.4–4)
WBC # BLD AUTO: 6.17 K/UL (ref 3.9–12.7)

## 2019-09-30 PROCEDURE — 80053 COMPREHEN METABOLIC PANEL: CPT

## 2019-09-30 PROCEDURE — 85025 COMPLETE CBC W/AUTO DIFF WBC: CPT

## 2019-09-30 PROCEDURE — 36415 COLL VENOUS BLD VENIPUNCTURE: CPT

## 2019-09-30 PROCEDURE — 84443 ASSAY THYROID STIM HORMONE: CPT

## 2019-09-30 PROCEDURE — 80061 LIPID PANEL: CPT

## 2019-10-07 ENCOUNTER — OFFICE VISIT (OUTPATIENT)
Dept: INTERNAL MEDICINE | Facility: CLINIC | Age: 49
End: 2019-10-07
Payer: MEDICAID

## 2019-10-07 VITALS
WEIGHT: 214.5 LBS | HEIGHT: 67 IN | DIASTOLIC BLOOD PRESSURE: 80 MMHG | BODY MASS INDEX: 33.67 KG/M2 | SYSTOLIC BLOOD PRESSURE: 120 MMHG | RESPIRATION RATE: 16 BRPM | HEART RATE: 84 BPM

## 2019-10-07 DIAGNOSIS — E66.9 OBESITY (BMI 30-39.9): ICD-10-CM

## 2019-10-07 DIAGNOSIS — I10 ESSENTIAL HYPERTENSION: Primary | ICD-10-CM

## 2019-10-07 DIAGNOSIS — R73.01 IMPAIRED FASTING BLOOD SUGAR: ICD-10-CM

## 2019-10-07 PROCEDURE — 99213 PR OFFICE/OUTPT VISIT, EST, LEVL III, 20-29 MIN: ICD-10-PCS | Mod: S$PBB,,, | Performed by: INTERNAL MEDICINE

## 2019-10-07 PROCEDURE — 99999 PR PBB SHADOW E&M-EST. PATIENT-LVL III: ICD-10-PCS | Mod: PBBFAC,,, | Performed by: INTERNAL MEDICINE

## 2019-10-07 PROCEDURE — 99213 OFFICE O/P EST LOW 20 MIN: CPT | Mod: S$PBB,,, | Performed by: INTERNAL MEDICINE

## 2019-10-07 PROCEDURE — 99213 OFFICE O/P EST LOW 20 MIN: CPT | Mod: PBBFAC | Performed by: INTERNAL MEDICINE

## 2019-10-07 PROCEDURE — 99999 PR PBB SHADOW E&M-EST. PATIENT-LVL III: CPT | Mod: PBBFAC,,, | Performed by: INTERNAL MEDICINE

## 2019-10-07 NOTE — PROGRESS NOTES
Subjective:       Patient ID: Rocio Arriaga is a 49 y.o. female.    Chief Complaint: Follow-up and Hypertension    Rocio Arriaga is a 49 y.o. female  Here with follow up .      Hypertension   This is a recurrent problem. The current episode started more than 1 year ago. The problem is unchanged. The problem is controlled. Associated symptoms include palpitations. Pertinent negatives include no anxiety, blurred vision, chest pain, headaches, malaise/fatigue, neck pain, orthopnea, peripheral edema, PND, shortness of breath or sweats. There are no associated agents to hypertension. Risk factors for coronary artery disease include obesity. Past treatments include diuretics. The current treatment provides significant improvement. Compliance problems include exercise.      Review of Systems   Constitutional: Negative for activity change, malaise/fatigue and unexpected weight change.   HENT: Negative for hearing loss, rhinorrhea and trouble swallowing.    Eyes: Negative for blurred vision, discharge and visual disturbance.   Respiratory: Negative for chest tightness, shortness of breath and wheezing.    Cardiovascular: Positive for palpitations. Negative for chest pain, orthopnea and PND.   Gastrointestinal: Negative for blood in stool, constipation, diarrhea and vomiting.   Endocrine: Negative for polydipsia and polyuria.   Genitourinary: Negative for difficulty urinating, dysuria, hematuria and menstrual problem.   Musculoskeletal: Positive for arthralgias and joint swelling. Negative for neck pain.   Skin: Positive for rash.   Neurological: Negative for weakness and headaches.   Psychiatric/Behavioral: Positive for dysphoric mood. Negative for confusion.       Objective:      Physical Exam   Constitutional: She is oriented to person, place, and time. She appears well-developed and well-nourished.   HENT:   Head: Normocephalic and atraumatic.   Right Ear: External ear normal.   Left Ear: External ear normal.    Mouth/Throat: Oropharynx is clear and moist.   Eyes: Pupils are equal, round, and reactive to light. Conjunctivae and EOM are normal.   Neck: Normal range of motion. Neck supple. No JVD present. No tracheal deviation present. No thyromegaly present.   Cardiovascular: Normal rate, regular rhythm, normal heart sounds and intact distal pulses.   Pulmonary/Chest: Effort normal and breath sounds normal. No respiratory distress. She has no wheezes. She has no rales. She exhibits no tenderness.   Abdominal: Soft. Bowel sounds are normal. She exhibits no distension and no mass. There is no tenderness. There is no rebound and no guarding.   Musculoskeletal: Normal range of motion. She exhibits no edema.        Right knee: She exhibits swelling and bony tenderness. She exhibits no erythema. Tenderness found.   Right ankle swells up    Lymphadenopathy:     She has no cervical adenopathy.   Neurological: She is alert and oriented to person, place, and time. She has normal reflexes. No cranial nerve deficit. She exhibits normal muscle tone. Coordination normal.   Skin: Skin is warm and dry.   Psychiatric: She has a normal mood and affect.   Nursing note and vitals reviewed.      Assessment:       1. Essential hypertension    2. Obesity (BMI 30-39.9)    3. Impaired fasting blood sugar        Plan:   Rocio was seen today for follow-up and hypertension.    Diagnoses and all orders for this visit:    Essential hypertension    Obesity (BMI 30-39.9)    Impaired fasting blood sugar        Well controlled.  Continue same medication and dose.  1. Keep weight close to ideal body weight.   2.   Avoid high salt foods (olives, pickles, smoked meats, salted potato chips, etc.).   Do not add salt to your food at the table.   Use only small amounts of salt when cooking.   3. Begin an exercise program. Discuss with your doctor what type of exercise program would be best for you. It doesn't have to be difficult. Even brisk walking for 20  minutes three times a week is a good form of exercise.   4. Avoid medicines which contain heart stimulants. This includes many cold and sinus decongestant pills and sprays as well as diet pills. Check the warnings about hypertension on the label. Stimulants such as amphetamine or cocaine could be lethal for someone with hypertension. Never take these.        # The patient is asked to make an attempt to improve diet and exercise patterns to aid in medical management of this problem.     # Eat  5 small meals a day.     # Cut out high carbohydrate  foods : bread, rice, pasta, potatoes.     # Exercise/walk 5x/week for at least 30-45  minutes.      Low carb diet discussed .      Problem List Items Addressed This Visit     Hypertension - Primary    Obesity (BMI 30-39.9)    Impaired fasting blood sugar

## 2019-10-15 ENCOUNTER — HOSPITAL ENCOUNTER (EMERGENCY)
Facility: HOSPITAL | Age: 49
Discharge: HOME | End: 2019-10-15
Attending: SURGERY
Payer: MEDICAID

## 2019-10-15 VITALS
SYSTOLIC BLOOD PRESSURE: 159 MMHG | RESPIRATION RATE: 20 BRPM | HEART RATE: 72 BPM | TEMPERATURE: 98 F | DIASTOLIC BLOOD PRESSURE: 79 MMHG

## 2019-10-15 DIAGNOSIS — S62.102D CLOSED FRACTURE OF LEFT WRIST WITH ROUTINE HEALING, SUBSEQUENT ENCOUNTER: Primary | ICD-10-CM

## 2019-10-15 DIAGNOSIS — M25.539 PAIN IN WRIST, UNSPECIFIED LATERALITY: ICD-10-CM

## 2019-10-15 PROCEDURE — 99281 EMR DPT VST MAYX REQ PHY/QHP: CPT

## 2019-10-15 NOTE — ED PROVIDER NOTES
"Encounter Date: 10/15/2019       History     Chief Complaint   Patient presents with    Wrist Injury     left wrist fracture post fall yesterday, seen at urgent care, splinted and referred to "either er or ortho".     Rocio Arriaga is a 49 y.o. female who presents with a known left wrist fracture after falling yesterday.  She reports that she was seen at urgent care and x-rays were obtained.  A plaster splint was applied and she was referred for casting.  She understood that she could be seen in the ER or ortho for a more permanent cast.    Patient denies any new injury or trauma.  Denies need for repeat imaging or a 2nd opinion.  Denies need for additional pain medication.  Splint is in place and extremity is neurovascularly intact. She reports that she has only come to the emergency room for casting and did not realize this was not performed here.    The history is provided by the patient.     Review of patient's allergies indicates:  No Known Allergies  Past Medical History:   Diagnosis Date    Hypertension      History reviewed. No pertinent surgical history.  Family History   Problem Relation Age of Onset    Diabetes Mother     Hypertension Mother     Diabetes Father     Breast cancer Neg Hx     Colon cancer Neg Hx     Ovarian cancer Neg Hx      Social History     Tobacco Use    Smoking status: Never Smoker    Smokeless tobacco: Never Used   Substance Use Topics    Alcohol use: Yes     Frequency: 2-4 times a month     Drinks per session: 1 or 2     Binge frequency: Never     Comment: socially    Drug use: No     Review of Systems   Constitutional: Negative for fever.   HENT: Negative for congestion, ear pain, sore throat and trouble swallowing.    Eyes: Negative for pain and redness.   Respiratory: Negative for cough and shortness of breath.    Cardiovascular: Negative for chest pain.   Gastrointestinal: Negative for abdominal pain.   Genitourinary: Negative for difficulty urinating and dysuria. "   Musculoskeletal: Positive for arthralgias (Left wrist pain with known fracture). Negative for back pain, myalgias and neck pain.   Skin: Negative for rash and wound.   Neurological: Negative for seizures, weakness and headaches.   Psychiatric/Behavioral: Negative.        Physical Exam     Initial Vitals [10/15/19 1735]   BP Pulse Resp Temp SpO2   (!) 159/79 72 20 97.9 °F (36.6 °C) --      MAP       --         Physical Exam    Nursing note and vitals reviewed.  Constitutional: No distress.   HENT:   Head: Normocephalic and atraumatic.   Right Ear: External ear normal.   Left Ear: External ear normal.   Nose: Nose normal.   Mouth/Throat: Oropharynx is clear and moist.   Eyes: Conjunctivae, EOM and lids are normal. Pupils are equal, round, and reactive to light.   Neck: Neck supple.   Cardiovascular: Normal rate, regular rhythm, S1 normal, S2 normal, normal heart sounds and intact distal pulses.   Pulmonary/Chest: Effort normal and breath sounds normal. No respiratory distress.   Abdominal: Soft. Bowel sounds are normal. There is no tenderness.   Musculoskeletal:   L wrist in volar short-arm splint, neurovascularly intact. Capillary refill less than 2 sec, fingers are warm and dry.   Neurological: She is alert and oriented to person, place, and time. She has normal strength. GCS eye subscore is 4. GCS verbal subscore is 5. GCS motor subscore is 6.   Skin: Skin is warm and dry. Capillary refill takes less than 2 seconds. No rash noted.   Psychiatric: She has a normal mood and affect. Her speech is normal and behavior is normal.         ED Course   Procedures                      --Patient continues to report that she is not here for a 2nd opinion, repeat imaging, or pain medicine.  She reports that she came here with the intentions of getting a more permanent cast.  She was educated that she would need to follow up with Orthopedics and follow-up information was provided.              Clinical Impression:        ICD-10-CM ICD-9-CM   1. Closed fracture of left wrist with routine healing, subsequent encounter S62.102D V54.19   2. Pain in wrist, unspecified laterality M25.539 719.43         Disposition:   Disposition: Discharged  Condition: Stable    The patient acknowledges that close follow up with medical provider is required. Instructed to follow up with Ortho within 2 days. Patient was given specific return precautions. The patient agrees to comply with all instruction and directions given in the ER.                       Reba Salazar NP  10/15/19 1821

## 2019-10-15 NOTE — DISCHARGE INSTRUCTIONS
**Follow up with ortho in 24-48 hours. Return to ER with worsening of symptoms. Keep splint clean and dry.    **Over the counter tylenol or motrin as needed for pain and/or fever as directed on package insert. Drink plenty fluids. Get plenty rest. Wash hands frequently.     **Our goal in the emergency department is to always give you outstanding care and exceptional service. You may receive a survey by mail or e-mail in the next week regarding your experience in our ED. We would greatly appreciate your completing and returning the survey. Your feedback provides us with a way to recognize our staff who give very good care and it helps us learn how to improve when your experience was below our aspiration of excellence.

## 2019-10-15 NOTE — ED TRIAGE NOTES
"49 y.o. female presents to ER ED 01/ED 01A   Chief Complaint   Patient presents with    Wrist Injury     left wrist fracture post fall yesterday, seen at urgent care, splinted and referred to "either er or ortho".   . No acute distress noted.  "

## 2019-10-16 ENCOUNTER — HOSPITAL ENCOUNTER (OUTPATIENT)
Dept: RADIOLOGY | Facility: HOSPITAL | Age: 49
Discharge: HOME | End: 2019-10-16
Attending: PHYSICIAN ASSISTANT
Payer: MEDICAID

## 2019-10-16 DIAGNOSIS — M25.532 LEFT WRIST PAIN: ICD-10-CM

## 2019-10-16 PROCEDURE — 73110 X-RAY EXAM OF WRIST: CPT | Mod: TC,LT

## 2019-10-16 PROCEDURE — 73110 X-RAY EXAM OF WRIST: CPT | Mod: 26,LT,, | Performed by: RADIOLOGY

## 2019-10-16 PROCEDURE — 73110 XR WRIST COMPLETE 3 VIEWS LEFT: ICD-10-PCS | Mod: 26,LT,, | Performed by: RADIOLOGY

## 2020-01-02 DIAGNOSIS — Z12.39 BREAST CANCER SCREENING: ICD-10-CM

## 2020-05-05 DIAGNOSIS — I10 ESSENTIAL HYPERTENSION: ICD-10-CM

## 2020-05-05 RX ORDER — HYDROCHLOROTHIAZIDE 25 MG/1
TABLET ORAL
Qty: 90 TABLET | Refills: 3 | Status: SHIPPED | OUTPATIENT
Start: 2020-05-05 | End: 2021-05-12 | Stop reason: SDUPTHER

## 2020-10-05 ENCOUNTER — PATIENT MESSAGE (OUTPATIENT)
Dept: ADMINISTRATIVE | Facility: HOSPITAL | Age: 50
End: 2020-10-05

## 2020-10-23 ENCOUNTER — TELEPHONE (OUTPATIENT)
Dept: INTERNAL MEDICINE | Facility: CLINIC | Age: 50
End: 2020-10-23

## 2020-10-23 DIAGNOSIS — I10 ESSENTIAL HYPERTENSION: Primary | ICD-10-CM

## 2020-10-23 NOTE — TELEPHONE ENCOUNTER
----- Message from Marietta Patiño sent at 10/23/2020  9:18 AM CDT -----  Contact: self  Rocio Arriaga  MRN: [id number]  : 1970  PCP: Qian Robin  Home Phone      [phone]  Work Phone      Not on file.  Mobile          [phone]      MESSAGE:   Patient requesting orders  Name of test (labs, mammo, x-ray, etc.):  lab  Where is test to be performed:[facility]  Date of test (if scheduled):   Reason (be specific): annual  Current Insurance: medicaid  Comments:      Phone: [phone]

## 2020-10-25 ENCOUNTER — HOSPITAL ENCOUNTER (EMERGENCY)
Facility: HOSPITAL | Age: 50
Discharge: HOME | End: 2020-10-25
Attending: SURGERY
Payer: MEDICAID

## 2020-10-25 VITALS
WEIGHT: 212 LBS | OXYGEN SATURATION: 99 % | SYSTOLIC BLOOD PRESSURE: 133 MMHG | BODY MASS INDEX: 34.07 KG/M2 | HEART RATE: 78 BPM | DIASTOLIC BLOOD PRESSURE: 78 MMHG | HEIGHT: 66 IN | TEMPERATURE: 98 F | RESPIRATION RATE: 20 BRPM
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
## 2020-10-25 DIAGNOSIS — T14.8XXA ANIMAL BITE: Primary | ICD-10-CM

## 2020-10-25 PROCEDURE — 90715 TDAP VACCINE 7 YRS/> IM: CPT | Performed by: SURGERY

## 2020-10-25 PROCEDURE — 63600175 PHARM REV CODE 636 W HCPCS: Performed by: SURGERY

## 2020-10-25 PROCEDURE — 99284 EMERGENCY DEPT VISIT MOD MDM: CPT | Mod: 25

## 2020-10-25 PROCEDURE — 96372 THER/PROPH/DIAG INJ SC/IM: CPT

## 2020-10-25 PROCEDURE — 10060 I&D ABSCESS SIMPLE/SINGLE: CPT

## 2020-10-25 PROCEDURE — 90471 IMMUNIZATION ADMIN: CPT | Performed by: SURGERY

## 2020-10-25 PROCEDURE — 25000003 PHARM REV CODE 250: Performed by: SURGERY

## 2020-10-25 RX ORDER — IBUPROFEN 800 MG/1
800 TABLET ORAL EVERY 6 HOURS PRN
Qty: 20 TABLET | Refills: 0 | Status: SHIPPED | OUTPATIENT
Start: 2020-10-25 | End: 2021-03-01

## 2020-10-25 RX ORDER — MUPIROCIN 20 MG/G
OINTMENT TOPICAL 3 TIMES DAILY
Qty: 15 G | Refills: 0 | Status: SHIPPED | OUTPATIENT
Start: 2020-10-25 | End: 2020-11-04

## 2020-10-25 RX ORDER — MUPIROCIN 20 MG/G
1 OINTMENT TOPICAL
Status: COMPLETED | OUTPATIENT
Start: 2020-10-25 | End: 2020-10-25

## 2020-10-25 RX ORDER — CLINDAMYCIN PHOSPHATE 150 MG/ML
600 INJECTION, SOLUTION INTRAVENOUS
Status: COMPLETED | OUTPATIENT
Start: 2020-10-25 | End: 2020-10-25

## 2020-10-25 RX ORDER — AMOXICILLIN AND CLAVULANATE POTASSIUM 875; 125 MG/1; MG/1
1 TABLET, FILM COATED ORAL 2 TIMES DAILY
Qty: 20 TABLET | Refills: 0 | Status: SHIPPED | OUTPATIENT
Start: 2020-10-25 | End: 2020-11-04

## 2020-10-25 RX ORDER — CLINDAMYCIN HYDROCHLORIDE 300 MG/1
300 CAPSULE ORAL 4 TIMES DAILY
Qty: 28 CAPSULE | Refills: 0 | Status: SHIPPED | OUTPATIENT
Start: 2020-10-25 | End: 2020-11-01

## 2020-10-25 RX ORDER — LIDOCAINE HYDROCHLORIDE 10 MG/ML
10 INJECTION, SOLUTION EPIDURAL; INFILTRATION; INTRACAUDAL; PERINEURAL
Status: COMPLETED | OUTPATIENT
Start: 2020-10-25 | End: 2020-10-25

## 2020-10-25 RX ADMIN — CLINDAMYCIN PHOSPHATE 600 MG: 150 INJECTION, SOLUTION INTRAMUSCULAR; INTRAVENOUS at 01:10

## 2020-10-25 RX ADMIN — MUPIROCIN 22 G: 20 OINTMENT TOPICAL at 01:10

## 2020-10-25 RX ADMIN — LIDOCAINE HYDROCHLORIDE 100 MG: 10 INJECTION, SOLUTION EPIDURAL; INFILTRATION; INTRACAUDAL; PERINEURAL at 01:10

## 2020-10-25 RX ADMIN — CLOSTRIDIUM TETANI TOXOID ANTIGEN (FORMALDEHYDE INACTIVATED), CORYNEBACTERIUM DIPHTHERIAE TOXOID ANTIGEN (FORMALDEHYDE INACTIVATED), BORDETELLA PERTUSSIS TOXOID ANTIGEN (GLUTARALDEHYDE INACTIVATED), BORDETELLA PERTUSSIS FILAMENTOUS HEMAGGLUTININ ANTIGEN (FORMALDEHYDE INACTIVATED), BORDETELLA PERTUSSIS PERTACTIN ANTIGEN, AND BORDETELLA PERTUSSIS FIMBRIAE 2/3 ANTIGEN 0.5 ML: 5; 2; 2.5; 5; 3; 5 INJECTION, SUSPENSION INTRAMUSCULAR at 01:10

## 2020-10-25 NOTE — ED PROVIDER NOTES
[facility]                                                 Chief Complaint  50 y.o. female with Animal Bite (left lower leg Lizbeth bite 1 week ago. site is inflammed and painful)      History of Present Illness  Rocio Arriaga presents to the emergency room with cat bite issue today  Patient states she has with by CT on left lower leg 1 week ago at home  Patient has had a small area of localized induration the left lower leg area  The center of this induration has a slight amount of fluctuance, no drainage  Patient would like an incision and drainage today, no fever on ER triage now  Patient is currently not any antibiotics, will need a tetanus update in the ER    The history is provided by the patient   device was not used during this ER visit  Medical history significant for hypertension  History reviewed. No pertinent surgical history.   No Known Allergies     I have reviewed all of this patient's past medical, surgical, family, and social   histories as well as active allergies and medications documented in the  electronic medical record    Review of Systems and Physical Exam      Review of Systems  -- Constitution - no fever, denies fatigue, no weakness, no chills  -- Eyes - no tearing or redness, no visual disturbance  -- Ear, Nose - no tinnitus or earache, no nasal congestion or discharge  -- Mouth,Throat - no sore throat, no toothache, normal voice, normal swallowing  -- Respiratory - denies cough and congestion, no shortness of breath, no PETER  -- Cardiovascular - denies chest pain, no palpitations, denies claudication  -- Gastrointestinal - denies abdominal pain, nausea, vomiting, or diarrhea  -- Genitourinary - no dysuria, denies flank pain, no hematuria, no STD risk  -- Musculoskeletal - denies back pain, negative for trauma or injury  -- Neurological - no headache, denies weakness or seizure; no LOC  -- Skin - left lower extremity cat bite    Vital Signs  Her oral  temperature is 97.8 °F (36.6 °C).   Her blood pressure is 174/85 and her pulse is 88.   Her respiration is 20 and oxygen saturation is 99%.     Physical Exam  -- Nursing note and vitals reviewed  -- Constitutional: Appears well-developed and well-nourished  -- Head: Atraumatic. Normocephalic. No obvious abnormality  -- Eyes: Pupils are equal and reactive to light. Normal conjunctiva and lids  -- Cardiac: Normal rate, regular rhythm and normal heart sounds  -- Pulmonary: Normal respiratory effort, breath sounds clear to auscultation  -- Abdominal: Soft, no tenderness. Normal bowel sounds. Normal liver edge  -- Musculoskeletal: Normal range of motion, no effusions. Joints stable   -- Neurological: No focal deficits. Showed good interaction with staff  -- Vascular: Posterior tibial, dorsalis pedis and radial pulses 2+ bilaterally    -- Lymphatics: No cervical or peripheral lymphadenopathy. No edema noted  -- Skin: 3 x 2 centimeter left lower extremity cat bite, small fluctuant center    Emergency Room Course      I & D - Incision and Drainage  -- Performed by: Hiram Moura M.D.  -- Date/Time: 1:20 PM 10/25/2020    -- Type: abscess  -- Location: Left lower extremity  -- Anesthesia: local infiltration  -- Local anesthetic: lidocaine 1%   -- Anesthetic total: 10 ml  -- Scalpel size: 11  -- Incision type: single straight  -- Complexity: simple  -- Drainage: pus  -- Drainage amount: scant  -- Wound treatment: incision  -- Packing material: 1/4 in gauze  -- Wound culture taken    Medications Given  lidocaine (PF) 10 mg/ml (1%) injection 100 mg (has no administration in time range)   Tdap vaccine injection 0.5 mL (has no administration in time range)   mupirocin 2 % ointment 22 g (has no administration in time range)   clindamycin injection 600 mg (has no administration in time range)     Diagnosis  [T14.8XXA] Animal bite (Primary)  [W55.01XA] Cat bite, initial encounter     Disposition and Plan  -- Disposition: home  --  Condition: stable  -- Follow-up: Patient to follow up with Qian Robin MD in 1-2 days.  -- I advised the patient that we have found no life threatening condition today  -- At this time, I believe the patient is clinically stable for discharge.   -- The patient acknowledges that close follow up with a MD is required   -- Patient agrees to comply with all instruction and direction given in the ER    This note is dictated on M*Modal word recognition program.  There are word recognition mistakes that are occasionally missed on review.                Hiram Moura MD  12/11/20 [id number]

## 2020-10-25 NOTE — ED NOTES
Dr. Moura in to I&D left lower leg wound. Localized with Lidocaine per Dr. Moura. Packed with Iodoform gauze and dressed with Mupirocin and sterile gauze.

## 2020-10-25 NOTE — ED TRIAGE NOTES
50 y.o. female presents to ER ED 04/ED 04   Chief Complaint   Patient presents with    Animal Bite     left lower leg Lizbeth bite 1 week ago. site is inflammed and painful   . No acute distress noted.

## 2020-10-27 ENCOUNTER — OFFICE VISIT (OUTPATIENT)
Dept: INTERNAL MEDICINE | Facility: CLINIC | Age: 50
End: 2020-10-27
Payer: MEDICAID

## 2020-10-27 VITALS
SYSTOLIC BLOOD PRESSURE: 144 MMHG | OXYGEN SATURATION: 98 % | BODY MASS INDEX: 33.91 KG/M2 | RESPIRATION RATE: 16 BRPM | DIASTOLIC BLOOD PRESSURE: 80 MMHG | HEART RATE: 88 BPM | WEIGHT: 211 LBS | HEIGHT: 66 IN

## 2020-10-27 DIAGNOSIS — S81.802A WOUND OF LEFT LOWER EXTREMITY, INITIAL ENCOUNTER: ICD-10-CM

## 2020-10-27 DIAGNOSIS — S81.852A CAT BITE OF LEFT LOWER LEG, INITIAL ENCOUNTER: Primary | ICD-10-CM
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
## 2020-10-27 PROCEDURE — 99213 OFFICE O/P EST LOW 20 MIN: CPT | Mod: S$PBB,,, | Performed by: INTERNAL MEDICINE

## 2020-10-27 PROCEDURE — 99213 PR OFFICE/OUTPT VISIT, EST, LEVL III, 20-29 MIN: ICD-10-PCS | Mod: S$PBB,,, | Performed by: INTERNAL MEDICINE

## 2020-10-27 PROCEDURE — 99999 PR PBB SHADOW E&M-EST. PATIENT-LVL III: CPT | Mod: PBBFAC,,, | Performed by: INTERNAL MEDICINE

## 2020-10-27 PROCEDURE — 99999 PR PBB SHADOW E&M-EST. PATIENT-LVL III: ICD-10-PCS | Mod: PBBFAC,,, | Performed by: INTERNAL MEDICINE

## 2020-10-27 PROCEDURE — 99213 OFFICE O/P EST LOW 20 MIN: CPT | Mod: PBBFAC | Performed by: INTERNAL MEDICINE

## 2020-10-27 RX ORDER — MOMETASONE FUROATE 1 MG/G
CREAM TOPICAL
COMMUNITY
Start: 2020-10-06 | End: 2021-05-12 | Stop reason: SDUPTHER

## 2020-10-27 NOTE — PROGRESS NOTES
Subjective:       Patient ID: Rocio Arriaga is a 50 y.o. female.    Chief Complaint: Follow-up    Rocio Arriaga is a 50 y.o. female  Here with follow up for left leg infection .  She reports bitten by a psycho cat . She has seen ER physician . Had Incision and drainage .on Sunday and packed.  She is taking Augmentin ; some diarrhea issues.      Review of Systems   Skin: Positive for color change and wound.       Objective:      Physical Exam  Skin:            Comments: Wound on left leg ; about  Inch size ; packing removed .  Some erythema surrounding the wound remains .         Assessment:       1. Cat bite of left lower leg, initial encounter    2. Wound of left lower extremity, initial encounter        Plan:   Rocio was seen today for follow-up.    Diagnoses and all orders for this visit:    Cat bite of left lower leg, initial encounter    Wound of left lower extremity, initial encounter    s/p I/D ; packing removed .  Some erythema remains     Continue Augmentin .  If any worsening ; fevers ; chills or swelling /red streaking up the leg .      Problem List Items Addressed This Visit     None

## 2020-11-02 ENCOUNTER — LAB VISIT (OUTPATIENT)
Dept: INTERNAL MEDICINE | Facility: CLINIC | Age: 50
End: 2020-11-02
Payer: MEDICAID

## 2020-11-02 DIAGNOSIS — I10 ESSENTIAL HYPERTENSION: ICD-10-CM

## 2020-11-02 LAB
ALBUMIN SERPL BCP-MCNC: 4.2 G/DL (ref 3.5–5.2)
ALP SERPL-CCNC: 93 U/L (ref 55–135)
ALT SERPL W/O P-5'-P-CCNC: 21 U/L (ref 10–44)
ANION GAP SERPL CALC-SCNC: 12 MMOL/L (ref 8–16)
AST SERPL-CCNC: 22 U/L (ref 10–40)
BASOPHILS # BLD AUTO: 0.06 K/UL (ref 0–0.2)
BASOPHILS NFR BLD: 1.1 % (ref 0–1.9)
BILIRUB SERPL-MCNC: 0.5 MG/DL (ref 0.1–1)
BUN SERPL-MCNC: 9 MG/DL (ref 6–20)
CALCIUM SERPL-MCNC: 9.2 MG/DL (ref 8.7–10.5)
CHLORIDE SERPL-SCNC: 104 MMOL/L (ref 95–110)
CHOLEST SERPL-MCNC: 169 MG/DL (ref 120–199)
CHOLEST/HDLC SERPL: 3.3 {RATIO} (ref 2–5)
CO2 SERPL-SCNC: 24 MMOL/L (ref 23–29)
CREAT SERPL-MCNC: 0.9 MG/DL (ref 0.5–1.4)
DIFFERENTIAL METHOD: ABNORMAL
EOSINOPHIL # BLD AUTO: 0.2 K/UL (ref 0–0.5)
EOSINOPHIL NFR BLD: 3.6 % (ref 0–8)
ERYTHROCYTE [DISTWIDTH] IN BLOOD BY AUTOMATED COUNT: 13.2 % (ref 11.5–14.5)
EST. GFR  (AFRICAN AMERICAN): >60 ML/MIN/1.73 M^2
EST. GFR  (NON AFRICAN AMERICAN): >60 ML/MIN/1.73 M^2
GLUCOSE SERPL-MCNC: 94 MG/DL (ref 70–110)
HCT VFR BLD AUTO: 44.7 % (ref 37–48.5)
HDLC SERPL-MCNC: 52 MG/DL (ref 40–75)
HDLC SERPL: 30.8 % (ref 20–50)
HGB BLD-MCNC: 14.8 G/DL (ref 12–16)
IMM GRANULOCYTES # BLD AUTO: 0.01 K/UL (ref 0–0.04)
IMM GRANULOCYTES NFR BLD AUTO: 0.2 % (ref 0–0.5)
LDLC SERPL CALC-MCNC: 98.2 MG/DL (ref 63–159)
LYMPHOCYTES # BLD AUTO: 1.4 K/UL (ref 1–4.8)
LYMPHOCYTES NFR BLD: 24.9 % (ref 18–48)
MCH RBC QN AUTO: 28.1 PG (ref 27–31)
MCHC RBC AUTO-ENTMCNC: 33.1 G/DL (ref 32–36)
MCV RBC AUTO: 85 FL (ref 82–98)
MONOCYTES # BLD AUTO: 0.4 K/UL (ref 0.3–1)
MONOCYTES NFR BLD: 6.7 % (ref 4–15)
NEUTROPHILS # BLD AUTO: 3.5 K/UL (ref 1.8–7.7)
NEUTROPHILS NFR BLD: 63.5 % (ref 38–73)
NONHDLC SERPL-MCNC: 117 MG/DL
NRBC BLD-RTO: 0 /100 WBC
PLATELET # BLD AUTO: 408 K/UL (ref 150–350)
PMV BLD AUTO: 9.8 FL (ref 9.2–12.9)
POTASSIUM SERPL-SCNC: 4.1 MMOL/L (ref 3.5–5.1)
PROT SERPL-MCNC: 8.2 G/DL (ref 6–8.4)
RBC # BLD AUTO: 5.26 M/UL (ref 4–5.4)
SODIUM SERPL-SCNC: 140 MMOL/L (ref 136–145)
TRIGL SERPL-MCNC: 94 MG/DL (ref 30–150)
TSH SERPL DL<=0.005 MIU/L-ACNC: 3.13 UIU/ML (ref 0.4–4)
WBC # BLD AUTO: 5.51 K/UL (ref 3.9–12.7)

## 2020-11-02 PROCEDURE — 80053 COMPREHEN METABOLIC PANEL: CPT

## 2020-11-02 PROCEDURE — 85025 COMPLETE CBC W/AUTO DIFF WBC: CPT

## 2020-11-02 PROCEDURE — 84443 ASSAY THYROID STIM HORMONE: CPT

## 2020-11-02 PROCEDURE — 80061 LIPID PANEL: CPT

## 2020-11-02 PROCEDURE — 36415 COLL VENOUS BLD VENIPUNCTURE: CPT | Mod: PBBFAC

## 2020-11-11 ENCOUNTER — OFFICE VISIT (OUTPATIENT)
Dept: INTERNAL MEDICINE | Facility: CLINIC | Age: 50
End: 2020-11-11
Payer: MEDICAID

## 2020-11-11 VITALS
SYSTOLIC BLOOD PRESSURE: 122 MMHG | WEIGHT: 207.44 LBS | DIASTOLIC BLOOD PRESSURE: 88 MMHG | OXYGEN SATURATION: 99 % | HEART RATE: 72 BPM | HEIGHT: 66 IN | RESPIRATION RATE: 16 BRPM | BODY MASS INDEX: 33.34 KG/M2

## 2020-11-11 DIAGNOSIS — I10 ESSENTIAL HYPERTENSION: ICD-10-CM
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
## 2020-11-11 DIAGNOSIS — R73.01 IMPAIRED FASTING BLOOD SUGAR: ICD-10-CM

## 2020-11-11 DIAGNOSIS — S81.852S: Primary | ICD-10-CM

## 2020-11-11 PROCEDURE — 99213 PR OFFICE/OUTPT VISIT, EST, LEVL III, 20-29 MIN: ICD-10-PCS | Mod: S$PBB,,, | Performed by: INTERNAL MEDICINE

## 2020-11-11 PROCEDURE — 99999 PR PBB SHADOW E&M-EST. PATIENT-LVL III: CPT | Mod: PBBFAC,,, | Performed by: INTERNAL MEDICINE

## 2020-11-11 PROCEDURE — 99213 OFFICE O/P EST LOW 20 MIN: CPT | Mod: PBBFAC | Performed by: INTERNAL MEDICINE

## 2020-11-11 PROCEDURE — 99999 PR PBB SHADOW E&M-EST. PATIENT-LVL III: ICD-10-PCS | Mod: PBBFAC,,, | Performed by: INTERNAL MEDICINE

## 2020-11-11 PROCEDURE — 99213 OFFICE O/P EST LOW 20 MIN: CPT | Mod: S$PBB,,, | Performed by: INTERNAL MEDICINE

## 2020-11-11 RX ORDER — DOXYCYCLINE 100 MG/1
100 CAPSULE ORAL EVERY 12 HOURS
Qty: 20 CAPSULE | Refills: 0 | Status: SHIPPED | OUTPATIENT
Start: 2020-11-11 | End: 2020-11-21

## 2020-11-11 NOTE — PROGRESS NOTES
Subjective:       Patient ID: Rocio Arriaga is a 50 y.o. female.    Chief Complaint: Follow-up and Wound Check    Rocio Arriaga is a 50 y.o. female  Here with cat bite infection follow up .  Left leg wound s/p I/D still some drainage .  She has not been able to take augmentin due to diarrhea .    Follow-up    Wound Check      Review of Systems   Skin: Positive for color change and wound.       Objective:      Physical Exam  Skin:            Comments: Wound on left leg ; about 1 Inch size ;  Some erythema surrounding the wound remains .         Assessment:       1. Cat bite of lower leg, left, sequela    2. Impaired fasting blood sugar    3. Essential hypertension        Plan:   Rocio was seen today for follow-up and wound check.    Diagnoses and all orders for this visit:    Cat bite of lower leg, left, sequela  -     doxycycline (VIBRAMYCIN) 100 MG Cap; Take 1 capsule (100 mg total) by mouth every 12 (twelve) hours. for 10 days    Impaired fasting blood sugar  -     Hemoglobin A1C; Future  Labs show improved glucose   Low carb diet   Weight loss stressed    Essential hypertension  -     CBC Auto Differential; Future  -     Comprehensive Metabolic Panel; Future  -     Lipid Panel; Future  -     TSH; Future    Well controlled.  Continue same medication and dose.  1. Keep weight close to ideal body weight.   2.   Avoid high salt foods (olives, pickles, smoked meats, salted potato chips, etc.).   Do not add salt to your food at the table.   Use only small amounts of salt when cooking.   3. Begin an exercise program. Discuss with your doctor what type of exercise program would be best for you. It doesn't have to be difficult. Even brisk walking for 20 minutes three times a week is a good form of exercise.   4. Avoid medicines which contain heart stimulants. This includes many cold and sinus decongestant pills and sprays as well as diet pills. Check the warnings about hypertension on the label. Stimulants such as  amphetamine or cocaine could be lethal for someone with hypertension. Never take these.      Problem List Items Addressed This Visit     None

## 2021-01-04 ENCOUNTER — PATIENT MESSAGE (OUTPATIENT)
Dept: ADMINISTRATIVE | Facility: HOSPITAL | Age: 51
End: 2021-01-04

## 2021-03-01 ENCOUNTER — OFFICE VISIT (OUTPATIENT)
Dept: INTERNAL MEDICINE | Facility: CLINIC | Age: 51
End: 2021-03-01
Payer: MEDICAID

## 2021-03-01 VITALS
OXYGEN SATURATION: 99 % | TEMPERATURE: 98 F | DIASTOLIC BLOOD PRESSURE: 94 MMHG | RESPIRATION RATE: 17 BRPM | BODY MASS INDEX: 32.06 KG/M2 | HEIGHT: 66 IN | HEART RATE: 95 BPM | WEIGHT: 199.5 LBS | SYSTOLIC BLOOD PRESSURE: 132 MMHG

## 2021-03-01 DIAGNOSIS — M25.461 EFFUSION OF RIGHT KNEE: Primary | ICD-10-CM

## 2021-03-01 DIAGNOSIS — L40.50 PSORIATIC ARTHRITIS: ICD-10-CM

## 2021-03-01 PROCEDURE — 99214 PR OFFICE/OUTPT VISIT, EST, LEVL IV, 30-39 MIN: ICD-10-PCS | Mod: 25,S$PBB,, | Performed by: NURSE PRACTITIONER

## 2021-03-01 PROCEDURE — 99214 OFFICE O/P EST MOD 30 MIN: CPT | Mod: PBBFAC,PN,25 | Performed by: NURSE PRACTITIONER

## 2021-03-01 PROCEDURE — 20610 LARGE JOINT ASPIRATION/INJECTION: R KNEE: ICD-10-PCS | Mod: S$PBB,RT,, | Performed by: NURSE PRACTITIONER

## 2021-03-01 PROCEDURE — 99999 PR PBB SHADOW E&M-EST. PATIENT-LVL IV: CPT | Mod: PBBFAC,,, | Performed by: NURSE PRACTITIONER

## 2021-03-01 PROCEDURE — 99999 PR PBB SHADOW E&M-EST. PATIENT-LVL IV: ICD-10-PCS | Mod: PBBFAC,,, | Performed by: NURSE PRACTITIONER

## 2021-03-01 PROCEDURE — 99214 OFFICE O/P EST MOD 30 MIN: CPT | Mod: 25,S$PBB,, | Performed by: NURSE PRACTITIONER

## 2021-03-01 PROCEDURE — 20610 DRAIN/INJ JOINT/BURSA W/O US: CPT | Mod: PBBFAC,PN | Performed by: NURSE PRACTITIONER

## 2021-03-01 PROCEDURE — 20610 DRAIN/INJ JOINT/BURSA W/O US: CPT | Mod: S$PBB,RT,, | Performed by: NURSE PRACTITIONER

## 2021-03-05 DIAGNOSIS — Z12.31 OTHER SCREENING MAMMOGRAM: ICD-10-CM

## 2021-04-05 ENCOUNTER — PATIENT MESSAGE (OUTPATIENT)
Dept: ADMINISTRATIVE | Facility: HOSPITAL | Age: 51
End: 2021-04-05

## 2021-05-04 ENCOUNTER — PATIENT MESSAGE (OUTPATIENT)
Dept: RESEARCH | Facility: HOSPITAL | Age: 51
End: 2021-05-04

## 2021-05-07 ENCOUNTER — LAB VISIT (OUTPATIENT)
Dept: INTERNAL MEDICINE | Facility: CLINIC | Age: 51
End: 2021-05-07
Payer: MEDICAID

## 2021-05-07 DIAGNOSIS — I10 ESSENTIAL HYPERTENSION: ICD-10-CM

## 2021-05-07 DIAGNOSIS — R73.01 IMPAIRED FASTING BLOOD SUGAR: ICD-10-CM

## 2021-05-07 LAB
ALBUMIN SERPL BCP-MCNC: 4 G/DL (ref 3.5–5.2)
ALP SERPL-CCNC: 89 U/L (ref 55–135)
ALT SERPL W/O P-5'-P-CCNC: 33 U/L (ref 10–44)
ANION GAP SERPL CALC-SCNC: 10 MMOL/L (ref 8–16)
AST SERPL-CCNC: 29 U/L (ref 10–40)
BASOPHILS # BLD AUTO: 0.04 K/UL (ref 0–0.2)
BASOPHILS NFR BLD: 0.8 % (ref 0–1.9)
BILIRUB SERPL-MCNC: 0.7 MG/DL (ref 0.1–1)
BUN SERPL-MCNC: 18 MG/DL (ref 6–20)
CALCIUM SERPL-MCNC: 9.3 MG/DL (ref 8.7–10.5)
CHLORIDE SERPL-SCNC: 105 MMOL/L (ref 95–110)
CHOLEST SERPL-MCNC: 168 MG/DL (ref 120–199)
CHOLEST/HDLC SERPL: 3.2 {RATIO} (ref 2–5)
CO2 SERPL-SCNC: 25 MMOL/L (ref 23–29)
CREAT SERPL-MCNC: 1 MG/DL (ref 0.5–1.4)
DIFFERENTIAL METHOD: NORMAL
EOSINOPHIL # BLD AUTO: 0.2 K/UL (ref 0–0.5)
EOSINOPHIL NFR BLD: 3.6 % (ref 0–8)
ERYTHROCYTE [DISTWIDTH] IN BLOOD BY AUTOMATED COUNT: 13.2 % (ref 11.5–14.5)
EST. GFR  (AFRICAN AMERICAN): >60 ML/MIN/1.73 M^2
EST. GFR  (NON AFRICAN AMERICAN): >60 ML/MIN/1.73 M^2
ESTIMATED AVG GLUCOSE: 108 MG/DL (ref 68–131)
GLUCOSE SERPL-MCNC: 99 MG/DL (ref 70–110)
HBA1C MFR BLD: 5.4 % (ref 4–5.6)
HCT VFR BLD AUTO: 42.1 % (ref 37–48.5)
HDLC SERPL-MCNC: 53 MG/DL (ref 40–75)
HDLC SERPL: 31.5 % (ref 20–50)
HGB BLD-MCNC: 13.9 G/DL (ref 12–16)
IMM GRANULOCYTES # BLD AUTO: 0.01 K/UL (ref 0–0.04)
IMM GRANULOCYTES NFR BLD AUTO: 0.2 % (ref 0–0.5)
LDLC SERPL CALC-MCNC: 101.8 MG/DL (ref 63–159)
LYMPHOCYTES # BLD AUTO: 1.4 K/UL (ref 1–4.8)
LYMPHOCYTES NFR BLD: 28.9 % (ref 18–48)
MCH RBC QN AUTO: 28.7 PG (ref 27–31)
MCHC RBC AUTO-ENTMCNC: 33 G/DL (ref 32–36)
MCV RBC AUTO: 87 FL (ref 82–98)
MONOCYTES # BLD AUTO: 0.4 K/UL (ref 0.3–1)
MONOCYTES NFR BLD: 7.4 % (ref 4–15)
NEUTROPHILS # BLD AUTO: 3 K/UL (ref 1.8–7.7)
NEUTROPHILS NFR BLD: 59.1 % (ref 38–73)
NONHDLC SERPL-MCNC: 115 MG/DL
NRBC BLD-RTO: 0 /100 WBC
PLATELET # BLD AUTO: 340 K/UL (ref 150–450)
PMV BLD AUTO: 10.7 FL (ref 9.2–12.9)
POTASSIUM SERPL-SCNC: 3.7 MMOL/L (ref 3.5–5.1)
PROT SERPL-MCNC: 8 G/DL (ref 6–8.4)
RBC # BLD AUTO: 4.84 M/UL (ref 4–5.4)
SODIUM SERPL-SCNC: 140 MMOL/L (ref 136–145)
TRIGL SERPL-MCNC: 66 MG/DL (ref 30–150)
TSH SERPL DL<=0.005 MIU/L-ACNC: 2.75 UIU/ML (ref 0.4–4)
WBC # BLD AUTO: 4.99 K/UL (ref 3.9–12.7)

## 2021-05-07 PROCEDURE — 36415 COLL VENOUS BLD VENIPUNCTURE: CPT | Mod: PBBFAC

## 2021-05-07 PROCEDURE — 84443 ASSAY THYROID STIM HORMONE: CPT | Performed by: INTERNAL MEDICINE

## 2021-05-07 PROCEDURE — 85025 COMPLETE CBC W/AUTO DIFF WBC: CPT | Performed by: INTERNAL MEDICINE

## 2021-05-07 PROCEDURE — 80061 LIPID PANEL: CPT | Performed by: INTERNAL MEDICINE

## 2021-05-07 PROCEDURE — 80053 COMPREHEN METABOLIC PANEL: CPT | Performed by: INTERNAL MEDICINE

## 2021-05-07 PROCEDURE — 83036 HEMOGLOBIN GLYCOSYLATED A1C: CPT | Performed by: INTERNAL MEDICINE

## 2021-05-12 ENCOUNTER — OFFICE VISIT (OUTPATIENT)
Dept: INTERNAL MEDICINE | Facility: CLINIC | Age: 51
End: 2021-05-12
Payer: MEDICAID

## 2021-05-12 VITALS
SYSTOLIC BLOOD PRESSURE: 110 MMHG | BODY MASS INDEX: 31.89 KG/M2 | RESPIRATION RATE: 16 BRPM | OXYGEN SATURATION: 98 % | DIASTOLIC BLOOD PRESSURE: 80 MMHG | WEIGHT: 198.44 LBS | HEART RATE: 72 BPM | HEIGHT: 66 IN

## 2021-05-12 DIAGNOSIS — L40.9 PSORIASIS: ICD-10-CM

## 2021-05-12 DIAGNOSIS — N76.3 CHRONIC VULVITIS: ICD-10-CM

## 2021-05-12 DIAGNOSIS — I10 ESSENTIAL HYPERTENSION: Primary | ICD-10-CM

## 2021-05-12 PROCEDURE — 99214 PR OFFICE/OUTPT VISIT, EST, LEVL IV, 30-39 MIN: ICD-10-PCS | Mod: S$PBB,,, | Performed by: INTERNAL MEDICINE

## 2021-05-12 PROCEDURE — 99999 PR PBB SHADOW E&M-EST. PATIENT-LVL IV: CPT | Mod: PBBFAC,,, | Performed by: INTERNAL MEDICINE

## 2021-05-12 PROCEDURE — 99214 OFFICE O/P EST MOD 30 MIN: CPT | Mod: PBBFAC | Performed by: INTERNAL MEDICINE

## 2021-05-12 PROCEDURE — 99999 PR PBB SHADOW E&M-EST. PATIENT-LVL IV: ICD-10-PCS | Mod: PBBFAC,,, | Performed by: INTERNAL MEDICINE

## 2021-05-12 PROCEDURE — 99214 OFFICE O/P EST MOD 30 MIN: CPT | Mod: S$PBB,,, | Performed by: INTERNAL MEDICINE

## 2021-05-12 RX ORDER — CLOTRIMAZOLE AND BETAMETHASONE DIPROPIONATE 10; .5 MG/ML; MG/ML
LOTION TOPICAL
Qty: 30 ML | Refills: 1 | Status: SHIPPED | OUTPATIENT
Start: 2021-05-12 | End: 2021-05-19

## 2021-05-12 RX ORDER — MOMETASONE FUROATE 1 MG/G
CREAM TOPICAL
Qty: 45 G | Refills: 2 | Status: SHIPPED | OUTPATIENT
Start: 2021-05-12 | End: 2023-08-08 | Stop reason: SDUPTHER

## 2021-05-12 RX ORDER — HYDROCHLOROTHIAZIDE 25 MG/1
25 TABLET ORAL DAILY
Qty: 90 TABLET | Refills: 3 | Status: SHIPPED | OUTPATIENT
Start: 2021-05-12 | End: 2022-06-08 | Stop reason: SDUPTHER

## 2021-05-19 RX ORDER — CLOTRIMAZOLE AND BETAMETHASONE DIPROPIONATE 10; .64 MG/G; MG/G
CREAM TOPICAL 2 TIMES DAILY
Qty: 45 G | Refills: 2 | Status: SHIPPED | OUTPATIENT
Start: 2021-05-19 | End: 2022-07-05

## 2021-06-21 ENCOUNTER — HOSPITAL ENCOUNTER (EMERGENCY)
Facility: HOSPITAL | Age: 51
Discharge: HOME | End: 2021-06-21
Attending: SURGERY
Payer: MEDICAID

## 2021-06-21 VITALS
OXYGEN SATURATION: 100 % | SYSTOLIC BLOOD PRESSURE: 157 MMHG | HEART RATE: 72 BPM | RESPIRATION RATE: 20 BRPM | DIASTOLIC BLOOD PRESSURE: 88 MMHG | TEMPERATURE: 98 F

## 2021-06-21 DIAGNOSIS — R07.89 ATYPICAL CHEST PAIN: Primary | ICD-10-CM

## 2021-06-21 DIAGNOSIS — K21.9 GASTROESOPHAGEAL REFLUX DISEASE WITHOUT ESOPHAGITIS: ICD-10-CM

## 2021-06-21 DIAGNOSIS — R07.9 CHEST PAIN: ICD-10-CM

## 2021-06-21 LAB
ALBUMIN SERPL BCP-MCNC: 3.8 G/DL (ref 3.5–5.2)
ALP SERPL-CCNC: 89 U/L (ref 55–135)
ALT SERPL W/O P-5'-P-CCNC: 40 U/L (ref 10–44)
ANION GAP SERPL CALC-SCNC: 7 MMOL/L (ref 8–16)
AST SERPL-CCNC: 27 U/L (ref 10–40)
BASOPHILS # BLD AUTO: NORMAL K/UL (ref 0–0.2)
BASOPHILS NFR BLD: 0 % (ref 0–1.9)
BILIRUB SERPL-MCNC: 0.5 MG/DL (ref 0.1–1)
BNP SERPL-MCNC: 10 PG/ML (ref 0–99)
BUN SERPL-MCNC: 13 MG/DL (ref 6–20)
CALCIUM SERPL-MCNC: 9.2 MG/DL (ref 8.7–10.5)
CHLORIDE SERPL-SCNC: 104 MMOL/L (ref 95–110)
CO2 SERPL-SCNC: 28 MMOL/L (ref 23–29)
CREAT SERPL-MCNC: 0.9 MG/DL (ref 0.5–1.4)
D DIMER PPP IA.FEU-MCNC: 0.27 MG/L FEU
DIFFERENTIAL METHOD: NORMAL
EOSINOPHIL # BLD AUTO: NORMAL K/UL (ref 0–0.5)
EOSINOPHIL NFR BLD: 2 % (ref 0–8)
ERYTHROCYTE [DISTWIDTH] IN BLOOD BY AUTOMATED COUNT: 13.4 % (ref 11.5–14.5)
EST. GFR  (AFRICAN AMERICAN): >60 ML/MIN/1.73 M^2
EST. GFR  (NON AFRICAN AMERICAN): >60 ML/MIN/1.73 M^2
GLUCOSE SERPL-MCNC: 91 MG/DL (ref 70–110)
HCT VFR BLD AUTO: 41.2 % (ref 37–48.5)
HGB BLD-MCNC: 13.2 G/DL (ref 12–16)
IMM GRANULOCYTES # BLD AUTO: NORMAL K/UL (ref 0–0.04)
IMM GRANULOCYTES NFR BLD AUTO: NORMAL % (ref 0–0.5)
LYMPHOCYTES # BLD AUTO: NORMAL K/UL (ref 1–4.8)
LYMPHOCYTES NFR BLD: 25 % (ref 18–48)
MCH RBC QN AUTO: 28 PG (ref 27–31)
MCHC RBC AUTO-ENTMCNC: 32 G/DL (ref 32–36)
MCV RBC AUTO: 88 FL (ref 82–98)
MONOCYTES # BLD AUTO: NORMAL K/UL (ref 0.3–1)
MONOCYTES NFR BLD: 8 % (ref 4–15)
NEUTROPHILS NFR BLD: 65 % (ref 38–73)
NRBC BLD-RTO: 0 /100 WBC
PLATELET # BLD AUTO: 331 K/UL (ref 150–450)
PLATELET BLD QL SMEAR: NORMAL
PMV BLD AUTO: 10 FL (ref 9.2–12.9)
POTASSIUM SERPL-SCNC: 3.9 MMOL/L (ref 3.5–5.1)
PROT SERPL-MCNC: 7.5 G/DL (ref 6–8.4)
RBC # BLD AUTO: 4.71 M/UL (ref 4–5.4)
SODIUM SERPL-SCNC: 139 MMOL/L (ref 136–145)
TROPONIN I SERPL DL<=0.01 NG/ML-MCNC: <0.006 NG/ML (ref 0–0.03)
WBC # BLD AUTO: 4.98 K/UL (ref 3.9–12.7)

## 2021-06-21 PROCEDURE — 96374 THER/PROPH/DIAG INJ IV PUSH: CPT

## 2021-06-21 PROCEDURE — 63600175 PHARM REV CODE 636 W HCPCS: Performed by: SURGERY

## 2021-06-21 PROCEDURE — 96375 TX/PRO/DX INJ NEW DRUG ADDON: CPT

## 2021-06-21 PROCEDURE — 93010 ELECTROCARDIOGRAM REPORT: CPT | Mod: ,,, | Performed by: INTERNAL MEDICINE

## 2021-06-21 PROCEDURE — 85379 FIBRIN DEGRADATION QUANT: CPT | Performed by: SURGERY

## 2021-06-21 PROCEDURE — 84484 ASSAY OF TROPONIN QUANT: CPT | Performed by: SURGERY

## 2021-06-21 PROCEDURE — 99285 EMERGENCY DEPT VISIT HI MDM: CPT | Mod: 25

## 2021-06-21 PROCEDURE — 85007 BL SMEAR W/DIFF WBC COUNT: CPT | Performed by: SURGERY

## 2021-06-21 PROCEDURE — 36415 COLL VENOUS BLD VENIPUNCTURE: CPT | Performed by: SURGERY

## 2021-06-21 PROCEDURE — 83880 ASSAY OF NATRIURETIC PEPTIDE: CPT | Performed by: SURGERY

## 2021-06-21 PROCEDURE — 93005 ELECTROCARDIOGRAM TRACING: CPT

## 2021-06-21 PROCEDURE — 93010 EKG 12-LEAD: ICD-10-PCS | Mod: ,,, | Performed by: INTERNAL MEDICINE

## 2021-06-21 PROCEDURE — 80053 COMPREHEN METABOLIC PANEL: CPT | Performed by: SURGERY

## 2021-06-21 PROCEDURE — 85027 COMPLETE CBC AUTOMATED: CPT | Performed by: SURGERY

## 2021-06-21 PROCEDURE — 25000003 PHARM REV CODE 250: Performed by: SURGERY

## 2021-06-21 RX ORDER — METOCLOPRAMIDE HYDROCHLORIDE 5 MG/ML
10 INJECTION INTRAMUSCULAR; INTRAVENOUS
Status: COMPLETED | OUTPATIENT
Start: 2021-06-21 | End: 2021-06-21

## 2021-06-21 RX ORDER — ASPIRIN 325 MG
325 TABLET ORAL
Status: COMPLETED | OUTPATIENT
Start: 2021-06-21 | End: 2021-06-21

## 2021-06-21 RX ORDER — OMEPRAZOLE 40 MG/1
40 CAPSULE, DELAYED RELEASE ORAL DAILY
Qty: 30 CAPSULE | Refills: 1 | Status: SHIPPED | OUTPATIENT
Start: 2021-06-21 | End: 2021-11-04 | Stop reason: ALTCHOICE

## 2021-06-21 RX ORDER — FAMOTIDINE 10 MG/ML
20 INJECTION INTRAVENOUS
Status: COMPLETED | OUTPATIENT
Start: 2021-06-21 | End: 2021-06-21

## 2021-06-21 RX ADMIN — ASPIRIN 325 MG: 325 TABLET ORAL at 12:06

## 2021-06-21 RX ADMIN — FAMOTIDINE 20 MG: 10 INJECTION, SOLUTION INTRAVENOUS at 12:06

## 2021-06-21 RX ADMIN — METOCLOPRAMIDE 10 MG: 5 INJECTION, SOLUTION INTRAMUSCULAR; INTRAVENOUS at 12:06

## 2021-06-21 RX ADMIN — SODIUM CHLORIDE 500 ML: 0.9 INJECTION, SOLUTION INTRAVENOUS at 12:06

## 2021-07-07 ENCOUNTER — PATIENT MESSAGE (OUTPATIENT)
Dept: ADMINISTRATIVE | Facility: HOSPITAL | Age: 51
End: 2021-07-07

## 2021-11-04 ENCOUNTER — CLINICAL SUPPORT (OUTPATIENT)
Dept: FAMILY MEDICINE | Facility: CLINIC | Age: 51
End: 2021-11-04
Payer: MEDICAID

## 2021-11-04 ENCOUNTER — OFFICE VISIT (OUTPATIENT)
Dept: FAMILY MEDICINE | Facility: CLINIC | Age: 51
End: 2021-11-04
Payer: MEDICAID

## 2021-11-04 VITALS
HEART RATE: 96 BPM | WEIGHT: 197.56 LBS | RESPIRATION RATE: 16 BRPM | BODY MASS INDEX: 31.75 KG/M2 | DIASTOLIC BLOOD PRESSURE: 86 MMHG | HEIGHT: 66 IN | SYSTOLIC BLOOD PRESSURE: 138 MMHG

## 2021-11-04 DIAGNOSIS — Z11.59 ENCOUNTER FOR HEPATITIS C SCREENING TEST FOR LOW RISK PATIENT: ICD-10-CM

## 2021-11-04 DIAGNOSIS — I10 PRIMARY HYPERTENSION: ICD-10-CM

## 2021-11-04 DIAGNOSIS — L40.50 PSORIATIC ARTHRITIS: ICD-10-CM

## 2021-11-04 DIAGNOSIS — G62.9 NEUROPATHY: ICD-10-CM

## 2021-11-04 DIAGNOSIS — I10 ESSENTIAL HYPERTENSION: ICD-10-CM

## 2021-11-04 DIAGNOSIS — G62.9 NEUROPATHY: Primary | ICD-10-CM

## 2021-11-04 LAB
ALBUMIN SERPL BCP-MCNC: 4.2 G/DL (ref 3.5–5.2)
ALBUMIN SERPL BCP-MCNC: 4.2 G/DL (ref 3.5–5.2)
ALP SERPL-CCNC: 87 U/L (ref 55–135)
ALP SERPL-CCNC: 87 U/L (ref 55–135)
ALT SERPL W/O P-5'-P-CCNC: 28 U/L (ref 10–44)
ALT SERPL W/O P-5'-P-CCNC: 28 U/L (ref 10–44)
ANION GAP SERPL CALC-SCNC: 13 MMOL/L (ref 8–16)
ANION GAP SERPL CALC-SCNC: 13 MMOL/L (ref 8–16)
AST SERPL-CCNC: 29 U/L (ref 10–40)
AST SERPL-CCNC: 29 U/L (ref 10–40)
BASOPHILS # BLD AUTO: 0.05 K/UL (ref 0–0.2)
BASOPHILS # BLD AUTO: 0.05 K/UL (ref 0–0.2)
BASOPHILS NFR BLD: 0.9 % (ref 0–1.9)
BASOPHILS NFR BLD: 0.9 % (ref 0–1.9)
BILIRUB SERPL-MCNC: 0.6 MG/DL (ref 0.1–1)
BILIRUB SERPL-MCNC: 0.6 MG/DL (ref 0.1–1)
BUN SERPL-MCNC: 13 MG/DL (ref 6–20)
BUN SERPL-MCNC: 13 MG/DL (ref 6–20)
CALCIUM SERPL-MCNC: 10.1 MG/DL (ref 8.7–10.5)
CALCIUM SERPL-MCNC: 10.1 MG/DL (ref 8.7–10.5)
CHLORIDE SERPL-SCNC: 99 MMOL/L (ref 95–110)
CHLORIDE SERPL-SCNC: 99 MMOL/L (ref 95–110)
CHOLEST SERPL-MCNC: 189 MG/DL (ref 120–199)
CHOLEST/HDLC SERPL: 3.5 {RATIO} (ref 2–5)
CO2 SERPL-SCNC: 29 MMOL/L (ref 23–29)
CO2 SERPL-SCNC: 29 MMOL/L (ref 23–29)
CREAT SERPL-MCNC: 0.9 MG/DL (ref 0.5–1.4)
CREAT SERPL-MCNC: 0.9 MG/DL (ref 0.5–1.4)
DIFFERENTIAL METHOD: NORMAL
DIFFERENTIAL METHOD: NORMAL
EOSINOPHIL # BLD AUTO: 0.1 K/UL (ref 0–0.5)
EOSINOPHIL # BLD AUTO: 0.1 K/UL (ref 0–0.5)
EOSINOPHIL NFR BLD: 1.6 % (ref 0–8)
EOSINOPHIL NFR BLD: 1.6 % (ref 0–8)
ERYTHROCYTE [DISTWIDTH] IN BLOOD BY AUTOMATED COUNT: 13.1 % (ref 11.5–14.5)
ERYTHROCYTE [DISTWIDTH] IN BLOOD BY AUTOMATED COUNT: 13.1 % (ref 11.5–14.5)
ERYTHROCYTE [SEDIMENTATION RATE] IN BLOOD BY WESTERGREN METHOD: 12 MM/HR (ref 0–20)
EST. GFR  (AFRICAN AMERICAN): >60 ML/MIN/1.73 M^2
EST. GFR  (AFRICAN AMERICAN): >60 ML/MIN/1.73 M^2
EST. GFR  (NON AFRICAN AMERICAN): >60 ML/MIN/1.73 M^2
EST. GFR  (NON AFRICAN AMERICAN): >60 ML/MIN/1.73 M^2
GLUCOSE SERPL-MCNC: 88 MG/DL (ref 70–110)
GLUCOSE SERPL-MCNC: 88 MG/DL (ref 70–110)
HCT VFR BLD AUTO: 44.9 % (ref 37–48.5)
HCT VFR BLD AUTO: 44.9 % (ref 37–48.5)
HDLC SERPL-MCNC: 54 MG/DL (ref 40–75)
HDLC SERPL: 28.6 % (ref 20–50)
HGB BLD-MCNC: 14.8 G/DL (ref 12–16)
HGB BLD-MCNC: 14.8 G/DL (ref 12–16)
IMM GRANULOCYTES # BLD AUTO: 0.01 K/UL (ref 0–0.04)
IMM GRANULOCYTES # BLD AUTO: 0.01 K/UL (ref 0–0.04)
IMM GRANULOCYTES NFR BLD AUTO: 0.2 % (ref 0–0.5)
IMM GRANULOCYTES NFR BLD AUTO: 0.2 % (ref 0–0.5)
LDLC SERPL CALC-MCNC: 114.4 MG/DL (ref 63–159)
LYMPHOCYTES # BLD AUTO: 1.4 K/UL (ref 1–4.8)
LYMPHOCYTES # BLD AUTO: 1.4 K/UL (ref 1–4.8)
LYMPHOCYTES NFR BLD: 24.7 % (ref 18–48)
LYMPHOCYTES NFR BLD: 24.7 % (ref 18–48)
MCH RBC QN AUTO: 29 PG (ref 27–31)
MCH RBC QN AUTO: 29 PG (ref 27–31)
MCHC RBC AUTO-ENTMCNC: 33 G/DL (ref 32–36)
MCHC RBC AUTO-ENTMCNC: 33 G/DL (ref 32–36)
MCV RBC AUTO: 88 FL (ref 82–98)
MCV RBC AUTO: 88 FL (ref 82–98)
MONOCYTES # BLD AUTO: 0.4 K/UL (ref 0.3–1)
MONOCYTES # BLD AUTO: 0.4 K/UL (ref 0.3–1)
MONOCYTES NFR BLD: 6.9 % (ref 4–15)
MONOCYTES NFR BLD: 6.9 % (ref 4–15)
NEUTROPHILS # BLD AUTO: 3.6 K/UL (ref 1.8–7.7)
NEUTROPHILS # BLD AUTO: 3.6 K/UL (ref 1.8–7.7)
NEUTROPHILS NFR BLD: 65.7 % (ref 38–73)
NEUTROPHILS NFR BLD: 65.7 % (ref 38–73)
NONHDLC SERPL-MCNC: 135 MG/DL
NRBC BLD-RTO: 0 /100 WBC
NRBC BLD-RTO: 0 /100 WBC
PLATELET # BLD AUTO: 362 K/UL (ref 150–450)
PLATELET # BLD AUTO: 362 K/UL (ref 150–450)
PMV BLD AUTO: 10.4 FL (ref 9.2–12.9)
PMV BLD AUTO: 10.4 FL (ref 9.2–12.9)
POTASSIUM SERPL-SCNC: 3.6 MMOL/L (ref 3.5–5.1)
POTASSIUM SERPL-SCNC: 3.6 MMOL/L (ref 3.5–5.1)
PROT SERPL-MCNC: 8 G/DL (ref 6–8.4)
PROT SERPL-MCNC: 8 G/DL (ref 6–8.4)
RBC # BLD AUTO: 5.1 M/UL (ref 4–5.4)
RBC # BLD AUTO: 5.1 M/UL (ref 4–5.4)
SODIUM SERPL-SCNC: 141 MMOL/L (ref 136–145)
SODIUM SERPL-SCNC: 141 MMOL/L (ref 136–145)
TRIGL SERPL-MCNC: 103 MG/DL (ref 30–150)
TSH SERPL DL<=0.005 MIU/L-ACNC: 2.75 UIU/ML (ref 0.4–4)
TSH SERPL DL<=0.005 MIU/L-ACNC: 2.75 UIU/ML (ref 0.4–4)
URATE SERPL-MCNC: 6.9 MG/DL (ref 2.4–5.7)
WBC # BLD AUTO: 5.51 K/UL (ref 3.9–12.7)
WBC # BLD AUTO: 5.51 K/UL (ref 3.9–12.7)

## 2021-11-04 PROCEDURE — 99213 OFFICE O/P EST LOW 20 MIN: CPT | Mod: PBBFAC | Performed by: STUDENT IN AN ORGANIZED HEALTH CARE EDUCATION/TRAINING PROGRAM

## 2021-11-04 PROCEDURE — 36415 COLL VENOUS BLD VENIPUNCTURE: CPT | Mod: PBBFAC

## 2021-11-04 PROCEDURE — 99999 PR PBB SHADOW E&M-EST. PATIENT-LVL III: ICD-10-PCS | Mod: PBBFAC,,, | Performed by: STUDENT IN AN ORGANIZED HEALTH CARE EDUCATION/TRAINING PROGRAM

## 2021-11-04 PROCEDURE — 80053 COMPREHEN METABOLIC PANEL: CPT | Performed by: STUDENT IN AN ORGANIZED HEALTH CARE EDUCATION/TRAINING PROGRAM

## 2021-11-04 PROCEDURE — 86592 SYPHILIS TEST NON-TREP QUAL: CPT | Performed by: STUDENT IN AN ORGANIZED HEALTH CARE EDUCATION/TRAINING PROGRAM

## 2021-11-04 PROCEDURE — 86803 HEPATITIS C AB TEST: CPT | Performed by: STUDENT IN AN ORGANIZED HEALTH CARE EDUCATION/TRAINING PROGRAM

## 2021-11-04 PROCEDURE — 82607 VITAMIN B-12: CPT | Performed by: STUDENT IN AN ORGANIZED HEALTH CARE EDUCATION/TRAINING PROGRAM

## 2021-11-04 PROCEDURE — 86235 NUCLEAR ANTIGEN ANTIBODY: CPT | Mod: 59 | Performed by: STUDENT IN AN ORGANIZED HEALTH CARE EDUCATION/TRAINING PROGRAM

## 2021-11-04 PROCEDURE — 84550 ASSAY OF BLOOD/URIC ACID: CPT | Performed by: STUDENT IN AN ORGANIZED HEALTH CARE EDUCATION/TRAINING PROGRAM

## 2021-11-04 PROCEDURE — 86038 ANTINUCLEAR ANTIBODIES: CPT | Performed by: STUDENT IN AN ORGANIZED HEALTH CARE EDUCATION/TRAINING PROGRAM

## 2021-11-04 PROCEDURE — 84443 ASSAY THYROID STIM HORMONE: CPT | Performed by: STUDENT IN AN ORGANIZED HEALTH CARE EDUCATION/TRAINING PROGRAM

## 2021-11-04 PROCEDURE — 99999 PR PBB SHADOW E&M-EST. PATIENT-LVL III: CPT | Mod: PBBFAC,,, | Performed by: STUDENT IN AN ORGANIZED HEALTH CARE EDUCATION/TRAINING PROGRAM

## 2021-11-04 PROCEDURE — 80061 LIPID PANEL: CPT | Performed by: INTERNAL MEDICINE

## 2021-11-04 PROCEDURE — 99213 PR OFFICE/OUTPT VISIT, EST, LEVL III, 20-29 MIN: ICD-10-PCS | Mod: S$PBB,,, | Performed by: STUDENT IN AN ORGANIZED HEALTH CARE EDUCATION/TRAINING PROGRAM

## 2021-11-04 PROCEDURE — 85651 RBC SED RATE NONAUTOMATED: CPT | Performed by: STUDENT IN AN ORGANIZED HEALTH CARE EDUCATION/TRAINING PROGRAM

## 2021-11-04 PROCEDURE — 85025 COMPLETE CBC W/AUTO DIFF WBC: CPT | Performed by: STUDENT IN AN ORGANIZED HEALTH CARE EDUCATION/TRAINING PROGRAM

## 2021-11-04 PROCEDURE — 86039 ANTINUCLEAR ANTIBODIES (ANA): CPT | Performed by: STUDENT IN AN ORGANIZED HEALTH CARE EDUCATION/TRAINING PROGRAM

## 2021-11-04 PROCEDURE — 99213 OFFICE O/P EST LOW 20 MIN: CPT | Mod: S$PBB,,, | Performed by: STUDENT IN AN ORGANIZED HEALTH CARE EDUCATION/TRAINING PROGRAM

## 2021-11-04 PROCEDURE — 83036 HEMOGLOBIN GLYCOSYLATED A1C: CPT | Performed by: STUDENT IN AN ORGANIZED HEALTH CARE EDUCATION/TRAINING PROGRAM

## 2021-11-04 PROCEDURE — 87389 HIV-1 AG W/HIV-1&-2 AB AG IA: CPT | Performed by: STUDENT IN AN ORGANIZED HEALTH CARE EDUCATION/TRAINING PROGRAM

## 2021-11-04 RX ORDER — GABAPENTIN 100 MG/1
100 CAPSULE ORAL NIGHTLY
Qty: 30 CAPSULE | Refills: 2 | Status: SHIPPED | OUTPATIENT
Start: 2021-11-04 | End: 2022-06-08

## 2021-11-05 LAB
ANA PATTERN 1: NORMAL
ANA SER QL IF: POSITIVE
ANA TITR SER IF: NORMAL {TITER}
ESTIMATED AVG GLUCOSE: 108 MG/DL (ref 68–131)
HBA1C MFR BLD: 5.4 % (ref 4–5.6)
HCV AB SERPL QL IA: NEGATIVE
HIV 1+2 AB+HIV1 P24 AG SERPL QL IA: NEGATIVE
RPR SER QL: NORMAL
VIT B12 SERPL-MCNC: 313 PG/ML (ref 210–950)

## 2021-11-11 LAB
ANTI SM ANTIBODY: 0.08 RATIO (ref 0–0.99)
ANTI SM/RNP ANTIBODY: 0.07 RATIO (ref 0–0.99)
ANTI-SM INTERPRETATION: NEGATIVE
ANTI-SM/RNP INTERPRETATION: NEGATIVE
ANTI-SSA ANTIBODY: 0.07 RATIO (ref 0–0.99)
ANTI-SSA INTERPRETATION: NEGATIVE
ANTI-SSB ANTIBODY: 0.09 RATIO (ref 0–0.99)
ANTI-SSB INTERPRETATION: NEGATIVE
DSDNA AB SER-ACNC: NORMAL [IU]/ML

## 2021-12-08 ENCOUNTER — OFFICE VISIT (OUTPATIENT)
Dept: FAMILY MEDICINE | Facility: CLINIC | Age: 51
End: 2021-12-08
Payer: MEDICAID

## 2021-12-08 ENCOUNTER — IMMUNIZATION (OUTPATIENT)
Dept: FAMILY MEDICINE | Facility: CLINIC | Age: 51
End: 2021-12-08
Payer: MEDICAID

## 2021-12-08 VITALS
SYSTOLIC BLOOD PRESSURE: 132 MMHG | BODY MASS INDEX: 31.71 KG/M2 | RESPIRATION RATE: 16 BRPM | DIASTOLIC BLOOD PRESSURE: 84 MMHG | HEIGHT: 66 IN | HEART RATE: 90 BPM | WEIGHT: 197.31 LBS

## 2021-12-08 DIAGNOSIS — Z23 NEED FOR VACCINATION: Primary | ICD-10-CM

## 2021-12-08 DIAGNOSIS — G62.9 NEUROPATHY: Primary | ICD-10-CM

## 2021-12-08 PROCEDURE — 99214 OFFICE O/P EST MOD 30 MIN: CPT | Mod: PBBFAC | Performed by: STUDENT IN AN ORGANIZED HEALTH CARE EDUCATION/TRAINING PROGRAM

## 2021-12-08 PROCEDURE — 99213 PR OFFICE/OUTPT VISIT, EST, LEVL III, 20-29 MIN: ICD-10-PCS | Mod: S$PBB,,, | Performed by: STUDENT IN AN ORGANIZED HEALTH CARE EDUCATION/TRAINING PROGRAM

## 2021-12-08 PROCEDURE — 99999 PR PBB SHADOW E&M-EST. PATIENT-LVL IV: ICD-10-PCS | Mod: PBBFAC,,, | Performed by: STUDENT IN AN ORGANIZED HEALTH CARE EDUCATION/TRAINING PROGRAM

## 2021-12-08 PROCEDURE — 0011A COVID-19, MRNA, LNP-S, PF, 100 MCG/0.5 ML DOSE VACCINE: CPT | Mod: PBBFAC

## 2021-12-08 PROCEDURE — 99999 PR PBB SHADOW E&M-EST. PATIENT-LVL IV: CPT | Mod: PBBFAC,,, | Performed by: STUDENT IN AN ORGANIZED HEALTH CARE EDUCATION/TRAINING PROGRAM

## 2021-12-08 PROCEDURE — 99213 OFFICE O/P EST LOW 20 MIN: CPT | Mod: S$PBB,,, | Performed by: STUDENT IN AN ORGANIZED HEALTH CARE EDUCATION/TRAINING PROGRAM

## 2022-01-05 ENCOUNTER — IMMUNIZATION (OUTPATIENT)
Dept: FAMILY MEDICINE | Facility: CLINIC | Age: 52
End: 2022-01-05
Payer: MEDICAID

## 2022-01-05 DIAGNOSIS — Z23 NEED FOR VACCINATION: Primary | ICD-10-CM

## 2022-01-05 PROCEDURE — 0012A COVID-19, MRNA, LNP-S, PF, 100 MCG/0.5 ML DOSE VACCINE: CPT | Mod: PBBFAC | Performed by: FAMILY MEDICINE

## 2022-01-24 ENCOUNTER — PATIENT MESSAGE (OUTPATIENT)
Dept: ADMINISTRATIVE | Facility: HOSPITAL | Age: 52
End: 2022-01-24
Payer: MEDICAID

## 2022-01-31 ENCOUNTER — PATIENT MESSAGE (OUTPATIENT)
Dept: ADMINISTRATIVE | Facility: HOSPITAL | Age: 52
End: 2022-01-31
Payer: MEDICAID

## 2022-03-15 ENCOUNTER — HOSPITAL ENCOUNTER (EMERGENCY)
Facility: HOSPITAL | Age: 52
Discharge: HOME | End: 2022-03-15
Attending: STUDENT IN AN ORGANIZED HEALTH CARE EDUCATION/TRAINING PROGRAM
Payer: MEDICAID

## 2022-03-15 VITALS
BODY MASS INDEX: 32.88 KG/M2 | OXYGEN SATURATION: 99 % | SYSTOLIC BLOOD PRESSURE: 145 MMHG | DIASTOLIC BLOOD PRESSURE: 87 MMHG | TEMPERATURE: 97 F | RESPIRATION RATE: 20 BRPM | HEART RATE: 81 BPM | WEIGHT: 203.69 LBS

## 2022-03-15 DIAGNOSIS — Z53.21 ELOPED FROM EMERGENCY DEPARTMENT: Primary | ICD-10-CM

## 2022-03-15 DIAGNOSIS — M25.561 RIGHT KNEE PAIN: ICD-10-CM

## 2022-03-15 DIAGNOSIS — M25.461 EFFUSION OF RIGHT KNEE: ICD-10-CM

## 2022-03-15 PROCEDURE — 63600175 PHARM REV CODE 636 W HCPCS: Performed by: STUDENT IN AN ORGANIZED HEALTH CARE EDUCATION/TRAINING PROGRAM

## 2022-03-15 PROCEDURE — 96372 THER/PROPH/DIAG INJ SC/IM: CPT | Performed by: STUDENT IN AN ORGANIZED HEALTH CARE EDUCATION/TRAINING PROGRAM

## 2022-03-15 PROCEDURE — 25000003 PHARM REV CODE 250: Performed by: STUDENT IN AN ORGANIZED HEALTH CARE EDUCATION/TRAINING PROGRAM

## 2022-03-15 PROCEDURE — 99284 EMERGENCY DEPT VISIT MOD MDM: CPT | Mod: 25

## 2022-03-15 RX ORDER — LIDOCAINE HYDROCHLORIDE 10 MG/ML
10 INJECTION, SOLUTION EPIDURAL; INFILTRATION; INTRACAUDAL; PERINEURAL
Status: COMPLETED | OUTPATIENT
Start: 2022-03-15 | End: 2022-03-15

## 2022-03-15 RX ORDER — KETOROLAC TROMETHAMINE 30 MG/ML
15 INJECTION, SOLUTION INTRAMUSCULAR; INTRAVENOUS
Status: COMPLETED | OUTPATIENT
Start: 2022-03-15 | End: 2022-03-15

## 2022-03-15 RX ADMIN — KETOROLAC TROMETHAMINE 15 MG: 30 INJECTION, SOLUTION INTRAMUSCULAR; INTRAVENOUS at 09:03

## 2022-03-15 RX ADMIN — LIDOCAINE HYDROCHLORIDE 100 MG: 10 INJECTION, SOLUTION EPIDURAL; INFILTRATION; INTRACAUDAL; PERINEURAL at 10:03

## 2022-03-17 NOTE — ED PROVIDER NOTES
[facility]                                                  Chief Complaint     Chief Complaint   Patient presents with    Knee Pain     C/o right knee swelling x a few months.       History of Present Illness  51 y.o. female with past medical history of hypertension and psoriasis who presents with knee pain x5 months.  On multiple times to began having knee pain and swelling.  Pain is sharp, intermittent, 10/10 severity nonradiating.  She states that she has had recurring effusions in [place] in her knees swelling currently.  She has had 2 knee arthrocentesis procedures in the past, 5 years ago and most recently and 3/21.  She denies knee trauma or abnormal twisting maneuvers. Denies lower extremity numbness, paresthesias or weakness.    History obtained from:  Patient    Review of patient's allergies indicates:   Allergen Reactions    Gluten protein      Past Medical History:   Diagnosis Date    Hypertension     Psoriasis      History reviewed. No pertinent surgical history.   Family History   Problem Relation Age of Onset    Diabetes Mother     Hypertension Mother     Diabetes Father     Liver cancer Brother         alcohol and drug induced    Breast cancer Neg Hx     Colon cancer Neg Hx     Ovarian cancer Neg Hx      Social History     Tobacco Use    Smoking status: Never Smoker    Smokeless tobacco: Never Used   Substance Use Topics    Alcohol use: Yes     Comment: socially    Drug use: No          Review of Systems and Physical Exam     Review of Systems      + knee pain and swelling    All other symptoms negative as stated below    --Constitutional - Denies fever, appetite change, chills  --Eyes - Denies eye discharge, eye pain, eye redness or visual disturbance  --HENT- Denies congestion, sore throat, drooling, ear discharge, rhinorrhea or trouble swallowing  --Respiratory - Denies cough, shortness of breath, wheezing  --Cardiovascular - Denies chest pain, leg swelling,  palpitations  --Gastrointestinal - Denies abdominal pain, abdominal distension, constipation, diarrhea, nausea or vomiting  --Genitourinary - Denies difficulty urinating, dysuria, hematuria, urgency  --Musculoskeletal - Denies arthralgias, myalgias  --Neurological - Denies dizziness, headaches, lightheadedness, weakness  --Hematologic - Denies easy bruising or easy bleeding  --Skin - Denies rash, wound or pallor  --Psychiatric- Denies dysphoric mood, nervousness/anxiety    Vital Signs   weight is 92.4 kg (203 lb 11.3 oz). Her oral temperature is 97 °F (36.1 °C). Her blood pressure is 145/87 (abnormal) and her pulse is 81. Her respiration is 20 and oxygen saturation is 99%.      Physical Exam   Nursing note and vitals reviewed  --Constitutional:  Well developed, well nourished. In no acute distress.  --HENT: Normocephalic, atraumatic. No rhinorrhea. Moist oral mucosa. No oropharyngeal edema, erythema or exudates.   --Eyes: PERRL. Extraocular movements intact. No periorbital swelling. Normal conjunctiva.  --Neck: Normal range of motion. Neck supple. No adenopathy  --Cardiac: Regular rhythm, normal S1, normal S2, no murmur, normal rate, intact distal pulses  --Pulmonary: Normal respiratory effort, breath sounds normal and equal bilaterally, no accessory muscle use, no respiratory distress  --Abdominal: Soft, normal bowel sounds, no tenderness, no guarding, no rebound  --Musculoskeletal:  Right knee suprapatellar swelling and tenderness.  2+ DP pulses bilaterally.  Normal range of motion. No deformity.  --Neurological:  Alert and oriented x 4. Follows commands appropriately.    --Skin: Warm and dry. No rash, pallor, cyanosis or jaundice.  --Psych: Normal mood    ED Course     Radiology (images visualized & reports reviewed by me)  X-Ray Knee 1 or 2 View Right   Final Result      Mild medial compartment joint space narrowing.  Subchondral sclerosis is noted in there are mild hypertrophic changes of the tibial spines.   There is dorsal spurring from the patella.Large suprapatellar joint effusion is present.No fracture or dislocation is detected.         Electronically signed by: Susy Jaramillo MD   Date:    03/15/2022   Time:    08:56          Medications Given  Medications   ketorolac injection 15 mg (15 mg Intramuscular Given 3/15/22 0937)   LIDOcaine (PF) 10 mg/ml (1%) injection 100 mg (100 mg Infiltration Given 3/15/22 1033)     Differential Diagnosis:  Chronic Knee effusion, gout, pseudogout, knee sprain    Clinical Tests:  Radiological Study: Ordered and reviewed    ED Management     weight is 92.4 kg (203 lb 11.3 oz). Her oral temperature is 97 °F (36.1 °C). Her blood pressure is 145/87 (abnormal) and her pulse is 81. Her respiration is 20 and oxygen saturation is 99%.     Physical exam with right knee suprapatellar swelling and tenderness palpation.  Right lower extremity neurovascularly intact.  Right knee x-ray revealed large suprapatellar knee effusion.  Plan was to perform knee arthrocentesis, but patient eloped during her ED course.    Diagnosis  The primary encounter diagnosis was Eloped from emergency department. Diagnoses of Right knee pain and Effusion of right knee were also pertinent to this visit.    Disposition and Plan  Condition: Stable  Disposition:  Eloped              Rudy Crandall MD  03/17/22 1802       Rudy Crandall MD  03/17/22 1804

## 2022-03-18 ENCOUNTER — PATIENT OUTREACH (OUTPATIENT)
Dept: ADMINISTRATIVE | Facility: HOSPITAL | Age: 52
End: 2022-03-18
Payer: MEDICAID

## 2022-03-18 DIAGNOSIS — Z12.11 SCREENING FOR COLON CANCER: ICD-10-CM

## 2022-03-18 DIAGNOSIS — Z12.31 SCREENING MAMMOGRAM FOR BREAST CANCER: Primary | ICD-10-CM

## 2022-03-18 NOTE — PROGRESS NOTES
Chart reviewed, immunization record updated.  No new results noted on [facility] or [facility] web site.  Care Everywhere unavailable.  Patient care coordination note and upcoming PCP visit updated.  Patient has routine PCP visit scheduled for 6/08/2022.  Fit kit order placed.  Patient requested fit kit, mailed to patient.FitKit was given to patient on 3/18/2022 2:40 PM   Patient declined to schedule MMG or pap smear at this time.

## 2022-03-30 ENCOUNTER — OFFICE VISIT (OUTPATIENT)
Dept: FAMILY MEDICINE | Facility: CLINIC | Age: 52
End: 2022-03-30
Payer: MEDICAID

## 2022-03-30 ENCOUNTER — OFFICE VISIT (OUTPATIENT)
Dept: ORTHOPEDICS | Facility: CLINIC | Age: 52
End: 2022-03-30
Payer: MEDICAID

## 2022-03-30 VITALS
RESPIRATION RATE: 20 BRPM | SYSTOLIC BLOOD PRESSURE: 136 MMHG | HEIGHT: 66 IN | HEART RATE: 88 BPM | WEIGHT: 203.5 LBS | HEIGHT: 66 IN | BODY MASS INDEX: 32.71 KG/M2 | DIASTOLIC BLOOD PRESSURE: 84 MMHG | SYSTOLIC BLOOD PRESSURE: 136 MMHG | WEIGHT: 203.5 LBS | BODY MASS INDEX: 32.71 KG/M2 | RESPIRATION RATE: 20 BRPM | HEART RATE: 88 BPM | DIASTOLIC BLOOD PRESSURE: 84 MMHG

## 2022-03-30 DIAGNOSIS — L40.50 PSORIATIC ARTHRITIS: ICD-10-CM

## 2022-03-30 DIAGNOSIS — M25.461 EFFUSION OF RIGHT KNEE: Primary | ICD-10-CM

## 2022-03-30 DIAGNOSIS — M25.561 ACUTE PAIN OF RIGHT KNEE: ICD-10-CM

## 2022-03-30 DIAGNOSIS — M17.11 PRIMARY OSTEOARTHRITIS OF RIGHT KNEE: ICD-10-CM

## 2022-03-30 LAB
APPEARANCE FLD: NORMAL
BODY FLD TYPE: NORMAL
BODY FLD TYPE: NORMAL
COLOR FLD: NORMAL
CRYSTALS FLD MICRO: NEGATIVE
LYMPHOCYTES NFR FLD MANUAL: 8 %
MONOS+MACROS NFR FLD MANUAL: 2 %
NEUTROPHILS NFR FLD MANUAL: 90 %
WBC # FLD: NORMAL /CU MM

## 2022-03-30 PROCEDURE — 99213 OFFICE O/P EST LOW 20 MIN: CPT | Mod: S$PBB,,, | Performed by: STUDENT IN AN ORGANIZED HEALTH CARE EDUCATION/TRAINING PROGRAM

## 2022-03-30 PROCEDURE — 99213 PR OFFICE/OUTPT VISIT, EST, LEVL III, 20-29 MIN: ICD-10-PCS | Mod: S$PBB,,, | Performed by: STUDENT IN AN ORGANIZED HEALTH CARE EDUCATION/TRAINING PROGRAM

## 2022-03-30 PROCEDURE — 99214 OFFICE O/P EST MOD 30 MIN: CPT | Mod: PBBFAC | Performed by: STUDENT IN AN ORGANIZED HEALTH CARE EDUCATION/TRAINING PROGRAM

## 2022-03-30 PROCEDURE — 3075F SYST BP GE 130 - 139MM HG: CPT | Mod: CPTII,,, | Performed by: STUDENT IN AN ORGANIZED HEALTH CARE EDUCATION/TRAINING PROGRAM

## 2022-03-30 PROCEDURE — 99999 PR PBB SHADOW E&M-EST. PATIENT-LVL III: ICD-10-PCS | Mod: PBBFAC,,, | Performed by: PHYSICIAN ASSISTANT

## 2022-03-30 PROCEDURE — 1160F PR REVIEW ALL MEDS BY PRESCRIBER/CLIN PHARMACIST DOCUMENTED: ICD-10-PCS | Mod: CPTII,,, | Performed by: PHYSICIAN ASSISTANT

## 2022-03-30 PROCEDURE — 3008F BODY MASS INDEX DOCD: CPT | Mod: CPTII,,, | Performed by: STUDENT IN AN ORGANIZED HEALTH CARE EDUCATION/TRAINING PROGRAM

## 2022-03-30 PROCEDURE — 87205 SMEAR GRAM STAIN: CPT | Performed by: PHYSICIAN ASSISTANT

## 2022-03-30 PROCEDURE — 1159F MED LIST DOCD IN RCRD: CPT | Mod: CPTII,,, | Performed by: PHYSICIAN ASSISTANT

## 2022-03-30 PROCEDURE — 99203 OFFICE O/P NEW LOW 30 MIN: CPT | Mod: S$PBB,25,, | Performed by: PHYSICIAN ASSISTANT

## 2022-03-30 PROCEDURE — 89051 BODY FLUID CELL COUNT: CPT | Performed by: PHYSICIAN ASSISTANT

## 2022-03-30 PROCEDURE — 1160F RVW MEDS BY RX/DR IN RCRD: CPT | Mod: CPTII,,, | Performed by: STUDENT IN AN ORGANIZED HEALTH CARE EDUCATION/TRAINING PROGRAM

## 2022-03-30 PROCEDURE — 1160F RVW MEDS BY RX/DR IN RCRD: CPT | Mod: CPTII,,, | Performed by: PHYSICIAN ASSISTANT

## 2022-03-30 PROCEDURE — 3008F PR BODY MASS INDEX (BMI) DOCUMENTED: ICD-10-PCS | Mod: CPTII,,, | Performed by: PHYSICIAN ASSISTANT

## 2022-03-30 PROCEDURE — 20610 PR DRAIN/INJECT LARGE JOINT/BURSA: ICD-10-PCS | Mod: S$PBB,RT,, | Performed by: PHYSICIAN ASSISTANT

## 2022-03-30 PROCEDURE — 99999 PR PBB SHADOW E&M-EST. PATIENT-LVL IV: ICD-10-PCS | Mod: PBBFAC,,, | Performed by: STUDENT IN AN ORGANIZED HEALTH CARE EDUCATION/TRAINING PROGRAM

## 2022-03-30 PROCEDURE — 99213 OFFICE O/P EST LOW 20 MIN: CPT | Mod: PBBFAC,27,25 | Performed by: PHYSICIAN ASSISTANT

## 2022-03-30 PROCEDURE — 87070 CULTURE OTHR SPECIMN AEROBIC: CPT | Performed by: PHYSICIAN ASSISTANT

## 2022-03-30 PROCEDURE — 99203 PR OFFICE/OUTPT VISIT, NEW, LEVL III, 30-44 MIN: ICD-10-PCS | Mod: S$PBB,25,, | Performed by: PHYSICIAN ASSISTANT

## 2022-03-30 PROCEDURE — 3079F DIAST BP 80-89 MM HG: CPT | Mod: CPTII,,, | Performed by: PHYSICIAN ASSISTANT

## 2022-03-30 PROCEDURE — 1160F PR REVIEW ALL MEDS BY PRESCRIBER/CLIN PHARMACIST DOCUMENTED: ICD-10-PCS | Mod: CPTII,,, | Performed by: STUDENT IN AN ORGANIZED HEALTH CARE EDUCATION/TRAINING PROGRAM

## 2022-03-30 PROCEDURE — 3079F DIAST BP 80-89 MM HG: CPT | Mod: CPTII,,, | Performed by: STUDENT IN AN ORGANIZED HEALTH CARE EDUCATION/TRAINING PROGRAM

## 2022-03-30 PROCEDURE — 3075F PR MOST RECENT SYSTOLIC BLOOD PRESS GE 130-139MM HG: ICD-10-PCS | Mod: CPTII,,, | Performed by: PHYSICIAN ASSISTANT

## 2022-03-30 PROCEDURE — 3075F SYST BP GE 130 - 139MM HG: CPT | Mod: CPTII,,, | Performed by: PHYSICIAN ASSISTANT

## 2022-03-30 PROCEDURE — 20610 DRAIN/INJ JOINT/BURSA W/O US: CPT | Mod: PBBFAC | Performed by: PHYSICIAN ASSISTANT

## 2022-03-30 PROCEDURE — 87070 CULTURE OTHR SPECIMN AEROBIC: CPT | Mod: 59 | Performed by: PHYSICIAN ASSISTANT

## 2022-03-30 PROCEDURE — 3008F PR BODY MASS INDEX (BMI) DOCUMENTED: ICD-10-PCS | Mod: CPTII,,, | Performed by: STUDENT IN AN ORGANIZED HEALTH CARE EDUCATION/TRAINING PROGRAM

## 2022-03-30 PROCEDURE — 1159F MED LIST DOCD IN RCRD: CPT | Mod: CPTII,,, | Performed by: STUDENT IN AN ORGANIZED HEALTH CARE EDUCATION/TRAINING PROGRAM

## 2022-03-30 PROCEDURE — 1159F PR MEDICATION LIST DOCUMENTED IN MEDICAL RECORD: ICD-10-PCS | Mod: CPTII,,, | Performed by: PHYSICIAN ASSISTANT

## 2022-03-30 PROCEDURE — 3008F BODY MASS INDEX DOCD: CPT | Mod: CPTII,,, | Performed by: PHYSICIAN ASSISTANT

## 2022-03-30 PROCEDURE — 3079F PR MOST RECENT DIASTOLIC BLOOD PRESSURE 80-89 MM HG: ICD-10-PCS | Mod: CPTII,,, | Performed by: PHYSICIAN ASSISTANT

## 2022-03-30 PROCEDURE — 20610 DRAIN/INJ JOINT/BURSA W/O US: CPT | Mod: S$PBB,RT,, | Performed by: PHYSICIAN ASSISTANT

## 2022-03-30 PROCEDURE — 3079F PR MOST RECENT DIASTOLIC BLOOD PRESSURE 80-89 MM HG: ICD-10-PCS | Mod: CPTII,,, | Performed by: STUDENT IN AN ORGANIZED HEALTH CARE EDUCATION/TRAINING PROGRAM

## 2022-03-30 PROCEDURE — 1159F PR MEDICATION LIST DOCUMENTED IN MEDICAL RECORD: ICD-10-PCS | Mod: CPTII,,, | Performed by: STUDENT IN AN ORGANIZED HEALTH CARE EDUCATION/TRAINING PROGRAM

## 2022-03-30 PROCEDURE — 3075F PR MOST RECENT SYSTOLIC BLOOD PRESS GE 130-139MM HG: ICD-10-PCS | Mod: CPTII,,, | Performed by: STUDENT IN AN ORGANIZED HEALTH CARE EDUCATION/TRAINING PROGRAM

## 2022-03-30 PROCEDURE — 89060 EXAM SYNOVIAL FLUID CRYSTALS: CPT | Performed by: PHYSICIAN ASSISTANT

## 2022-03-30 PROCEDURE — 99999 PR PBB SHADOW E&M-EST. PATIENT-LVL IV: CPT | Mod: PBBFAC,,, | Performed by: STUDENT IN AN ORGANIZED HEALTH CARE EDUCATION/TRAINING PROGRAM

## 2022-03-30 PROCEDURE — 99999 PR PBB SHADOW E&M-EST. PATIENT-LVL III: CPT | Mod: PBBFAC,,, | Performed by: PHYSICIAN ASSISTANT

## 2022-03-30 NOTE — PROGRESS NOTES
Subjective:       Patient ID: Rocio Arriaga is a 51 y.o. female.    Chief Complaint: Knee Pain (Pt has right knee deidre and swelling. Pt states this has been ongoing for about 4 months. Did have it drained previously. )    Pt here with right knee pain and swelling that occurs intermittently. She was seen in the ER 3/15/22 and had xray showing Mild medial compartment joint space narrowing.  Subchondral sclerosis is noted in there are mild hypertrophic changes of the tibial spines.  There is dorsal spurring from the patella.Large suprapatellar joint effusion is present.No fracture or dislocation is detected.    Pt states she has had an MRI of the knee over 5 years ago. She has required joint aspiration years ago as well and unsure if fluid was analyzed.     No fever/chills.     Review of Systems   Constitutional: Negative for chills and fever.   Musculoskeletal: Positive for arthralgias and joint swelling.   Skin: Positive for rash.       Objective:      Physical Exam   Constitutional: No distress.   Musculoskeletal:         General: Swelling (right knee) present. No deformity.      Comments: Moderate right knee effusion with significant psoriatic plaques overlying anterior knee   Neurological: She is alert.   Skin:   Psoriatic plaques to all ext   Vitals reviewed.      Assessment:       1. Effusion of right knee    2. Psoriatic arthritis        Plan:       Effusion of right knee  -     WBC & Diff,Body Fluid Joint Fluid, Right Knee; Future; Expected date: 03/30/2022  -     Body fluid crystal Joint Fluid, Right Knee; Future; Expected date: 03/30/2022  -     CULTURE, FLUID  (AEROBIC); Future; Expected date: 03/30/2022  -     MRI Knee Without Contrast Right; Future; Expected date: 03/30/2022    Psoriatic arthritis  -     Ambulatory referral/consult to Rheumatology; Future; Expected date: 04/06/2022     Area prepped and draped in sterile fashion. Using anterior/lateral approach, right knee aspiration attempted, but unable  to remove any fluid. Pt tolerated procedure well    Discussed with patient this may be all related to psoriatic arthritis. Discussed the need for derm evaluation for potential biologic initiation.     MRI right knee    Refer ortho    RTC for worsening symptoms or failure to improve, or RTC PRN/as scheduled

## 2022-03-30 NOTE — PROGRESS NOTES
Subjective:      Patient ID: Rocio Arriaga is a 51 y.o. female.    Chief Complaint: Pain of the Right Knee    Review of patient's allergies indicates:   Allergen Reactions    Gluten protein         52 yo F presents to clinic with c/o right knee pain and swelling x 4 months.  Denies any injury or trauma.  Achy pain to medial knee, worse with bending knee and walking and improves some with rest.  Psoriasis to anterior knee but otherwise no redness or warmth.  No catching/locking/giving out.  No radiation of pain.  No numbness/tingling.  Per chart review she has had elevated uric acid in the past.  She saw PCP this morning, aspiration attempted but not successful.          Review of Systems   Constitutional: Negative for chills, diaphoresis and fever.   HENT: Negative for congestion, ear discharge and ear pain.    Eyes: Negative for blurred vision, discharge, double vision and pain.   Cardiovascular: Negative for chest pain, claudication and cyanosis.   Respiratory: Negative for cough, hemoptysis and shortness of breath.    Endocrine: Negative for cold intolerance and heat intolerance.   Skin: Negative for color change, dry skin, itching and rash.   Musculoskeletal: Positive for joint pain and joint swelling. Negative for back pain, falls, gout, muscle weakness and neck pain.   Gastrointestinal: Negative for abdominal pain and change in bowel habit.   Neurological: Negative for brief paralysis, disturbances in coordination, dizziness, numbness and paresthesias.   Psychiatric/Behavioral: Negative for altered mental status and depression.         Objective:          General    Constitutional: She is oriented to person, place, and time. She appears well-developed and well-nourished. No distress.   HENT:   Head: Atraumatic.   Eyes: EOM are normal. Right eye exhibits no discharge. Left eye exhibits no discharge.   Cardiovascular: Normal rate.    Pulmonary/Chest: Effort normal. No respiratory distress.   Abdominal: Soft.    Neurological: She is alert and oriented to person, place, and time.   Psychiatric: She has a normal mood and affect. Her behavior is normal.           Right Knee Exam     Inspection   Erythema: absent  Scars: absent  Swelling: present  Effusion: present  Deformity: absent (psoriatic plaques to anterior knee)  Bruising: absent    Tenderness   The patient is tender to palpation of the medial joint line.    Range of Motion   Extension: 0   Flexion: 120     Tests   Ligament Examination   MCL - Valgus: normal (0 to 2mm)  LCL - Varus: normal    Other   Sensation: normal    Left Knee Exam     Inspection   Erythema: absent  Scars: absent  Swelling: absent  Effusion: absent  Deformity: absent  Bruising: absent    Tenderness   The patient is experiencing no tenderness.     Range of Motion   Extension: 0   Flexion: 140     Tests   Stability   MCL - Valgus: normal (0 to 2mm)  LCL - Varus: normal (0 to 2mm)    Other   Sensation: normal    Vascular Exam     Right Pulses  Dorsalis Pedis:      2+          Left Pulses  Dorsalis Pedis:      2+          Edema  Right Lower Leg: absent  Left Lower Leg: absent              Assessment:         Xray Right Knee 3/15/22:  Mild medial compartment joint space narrowing.  Subchondral sclerosis is noted in there are mild hypertrophic changes of the tibial spines.  There is dorsal spurring from the patella.Large suprapatellar joint effusion is present.No fracture or dislocation is detected.       Encounter Diagnosis   Name Primary?    Effusion of right knee Yes    Effusion of right knee  -     CULTURE, FLUID  (AEROBIC)  -     WBC & Diff,Body Fluid Joint Fluid, Right Knee  -     Culture, Body Fluid - Bactec  -     Body fluid crystal Joint Fluid, Right Knee               Plan:         I made the decision to obtain old records of the patient including previous notes and imaging.     The total face-to-face encounter time with this patient was 30 minutes and greater than 50% of of the encounter  time was spent counseling the patient, coordinating care, and education regarding the pathology and natural history of his diagnosis. We have discussed a variety of treatment options. Pt is requesting knee aspiration at this time. PCP ordered MRI today.    1. Aspiration:  A time out was performed, including verification of patient ID, procedure, site and side, availability of information and equipment, review of safety issues, and agreement with consent, the procedure site was marked.  After time out was performed, the patient was prepped aseptically with chloraprep swabsticks. 63cc's of serosanguineous joint fluid was aspirated from the Superolateral  aspect of the right Knee Joint using a 18g x 1.5 needle in sterile fashion without complication. Fluid sent to lab for analysis.  Ace wrap was applied to knee following aspiration.    2. OTC NSAIDs as directed as needed.  3. Ice compress to the affected area 2-3x a day for 15-20 minutes as needed for pain management.  4. RTC after joint fluid results received. To ER if symptoms continue/worsen or if any new signs or symptoms.      Patient voices understanding of and agreement with treatment plan. All of the patient's questions were answered and the patient will contact us if she has any questions or concerns in the interim.

## 2022-03-30 NOTE — PROGRESS NOTES
63 ml's of joint fluid collected from right knee by STEVEN Jackson today 3/30/22 at 12:07 pm. 35 ml's placed in sterile cup for lab processing per lab request. Orders placed for WBC, Bactec, Aerobic culture, and Crystal Joint fluid per lab. Once labeled, brought to lab immediately following procedure, for processing.

## 2022-03-31 ENCOUNTER — TELEPHONE (OUTPATIENT)
Dept: FAMILY MEDICINE | Facility: CLINIC | Age: 52
End: 2022-03-31
Payer: MEDICAID

## 2022-03-31 ENCOUNTER — HOSPITAL ENCOUNTER (OUTPATIENT)
Dept: RADIOLOGY | Facility: HOSPITAL | Age: 52
Discharge: HOME | End: 2022-03-31
Attending: STUDENT IN AN ORGANIZED HEALTH CARE EDUCATION/TRAINING PROGRAM
Payer: MEDICAID

## 2022-03-31 DIAGNOSIS — M25.461 EFFUSION OF RIGHT KNEE: ICD-10-CM

## 2022-03-31 DIAGNOSIS — S83.261A ACUTE LATERAL MENISCAL TEAR WITH PERIPHERAL DETACHMENT, RIGHT, INITIAL ENCOUNTER: Primary | ICD-10-CM

## 2022-03-31 LAB — PATH INTERP FLD-IMP: NORMAL

## 2022-03-31 PROCEDURE — 73721 MRI KNEE WITHOUT CONTRAST RIGHT: ICD-10-PCS | Mod: 26,RT,, | Performed by: RADIOLOGY

## 2022-03-31 PROCEDURE — 73721 MRI JNT OF LWR EXTRE W/O DYE: CPT | Mod: 26,RT,, | Performed by: RADIOLOGY

## 2022-03-31 PROCEDURE — 73721 MRI JNT OF LWR EXTRE W/O DYE: CPT | Mod: TC,RT

## 2022-03-31 NOTE — TELEPHONE ENCOUNTER
Referral,demographics, clinicals faxed to Rheumatology at [phone] with request for them to get in contact with patient to schedule appointment. Patient given specialist address and phone number, instructed to contact specialist to check status of appointment.

## 2022-03-31 NOTE — PROGRESS NOTES
Please inform the patient her MRI shows complex tear of the meniscus with inflammation of the knee joint and edema of the bones. She needs to see ortho, referral placed.   MB

## 2022-04-02 LAB
BACTERIA FLD AEROBE CULT: NORMAL
GRAM STN SPEC: NORMAL

## 2022-04-04 ENCOUNTER — PATIENT MESSAGE (OUTPATIENT)
Dept: ADMINISTRATIVE | Facility: HOSPITAL | Age: 52
End: 2022-04-04
Payer: MEDICAID

## 2022-04-04 LAB — BACTERIA FLD CULT: NORMAL

## 2022-04-27 ENCOUNTER — TELEPHONE (OUTPATIENT)
Dept: FAMILY MEDICINE | Facility: CLINIC | Age: 52
End: 2022-04-27
Payer: MEDICAID

## 2022-04-27 ENCOUNTER — PATIENT MESSAGE (OUTPATIENT)
Dept: ADMINISTRATIVE | Facility: HOSPITAL | Age: 52
End: 2022-04-27
Payer: MEDICAID

## 2022-04-27 ENCOUNTER — PATIENT OUTREACH (OUTPATIENT)
Dept: ADMINISTRATIVE | Facility: HOSPITAL | Age: 52
End: 2022-04-27
Payer: MEDICAID

## 2022-04-27 NOTE — TELEPHONE ENCOUNTER
----- Message from Zuleyka Mahoney sent at 4/27/2022  3:45 PM CDT -----  Regarding: missed call       Who Called: Rocio         Who Left Message for Patient: Marisol         Does the patient know what this is regarding? No         Best Call Back Number:[phone]         Additional Information:

## 2022-06-08 ENCOUNTER — OFFICE VISIT (OUTPATIENT)
Dept: FAMILY MEDICINE | Facility: CLINIC | Age: 52
End: 2022-06-08
Payer: MEDICAID

## 2022-06-08 ENCOUNTER — CLINICAL SUPPORT (OUTPATIENT)
Dept: FAMILY MEDICINE | Facility: CLINIC | Age: 52
End: 2022-06-08
Payer: MEDICAID

## 2022-06-08 VITALS
HEART RATE: 66 BPM | HEIGHT: 66 IN | SYSTOLIC BLOOD PRESSURE: 122 MMHG | DIASTOLIC BLOOD PRESSURE: 80 MMHG | WEIGHT: 207.44 LBS | BODY MASS INDEX: 33.34 KG/M2 | RESPIRATION RATE: 16 BRPM

## 2022-06-08 DIAGNOSIS — L40.50 PSORIATIC ARTHRITIS: ICD-10-CM

## 2022-06-08 DIAGNOSIS — R22.1 NODULE OF NECK: ICD-10-CM

## 2022-06-08 DIAGNOSIS — I10 ESSENTIAL HYPERTENSION: Primary | ICD-10-CM

## 2022-06-08 DIAGNOSIS — I10 ESSENTIAL HYPERTENSION: ICD-10-CM

## 2022-06-08 PROBLEM — R73.01 IMPAIRED FASTING BLOOD SUGAR: Status: RESOLVED | Noted: 2019-10-07 | Resolved: 2022-06-08

## 2022-06-08 LAB
ALBUMIN SERPL BCP-MCNC: 4.1 G/DL (ref 3.5–5.2)
ALP SERPL-CCNC: 89 U/L (ref 55–135)
ALT SERPL W/O P-5'-P-CCNC: 22 U/L (ref 10–44)
ANION GAP SERPL CALC-SCNC: 12 MMOL/L (ref 8–16)
AST SERPL-CCNC: 26 U/L (ref 10–40)
BILIRUB SERPL-MCNC: 0.6 MG/DL (ref 0.1–1)
BUN SERPL-MCNC: 14 MG/DL (ref 6–20)
CALCIUM SERPL-MCNC: 9.9 MG/DL (ref 8.7–10.5)
CHLORIDE SERPL-SCNC: 100 MMOL/L (ref 95–110)
CO2 SERPL-SCNC: 29 MMOL/L (ref 23–29)
CREAT SERPL-MCNC: 0.8 MG/DL (ref 0.5–1.4)
EST. GFR  (AFRICAN AMERICAN): >60 ML/MIN/1.73 M^2
EST. GFR  (NON AFRICAN AMERICAN): >60 ML/MIN/1.73 M^2
GLUCOSE SERPL-MCNC: 92 MG/DL (ref 70–110)
POTASSIUM SERPL-SCNC: 3.9 MMOL/L (ref 3.5–5.1)
PROT SERPL-MCNC: 8.2 G/DL (ref 6–8.4)
SODIUM SERPL-SCNC: 141 MMOL/L (ref 136–145)
TSH SERPL DL<=0.005 MIU/L-ACNC: 3.04 UIU/ML (ref 0.4–4)

## 2022-06-08 PROCEDURE — 1159F PR MEDICATION LIST DOCUMENTED IN MEDICAL RECORD: ICD-10-PCS | Mod: CPTII,,, | Performed by: STUDENT IN AN ORGANIZED HEALTH CARE EDUCATION/TRAINING PROGRAM

## 2022-06-08 PROCEDURE — 99999 PR PBB SHADOW E&M-EST. PATIENT-LVL III: ICD-10-PCS | Mod: PBBFAC,,, | Performed by: STUDENT IN AN ORGANIZED HEALTH CARE EDUCATION/TRAINING PROGRAM

## 2022-06-08 PROCEDURE — 80053 COMPREHEN METABOLIC PANEL: CPT | Performed by: STUDENT IN AN ORGANIZED HEALTH CARE EDUCATION/TRAINING PROGRAM

## 2022-06-08 PROCEDURE — 3008F PR BODY MASS INDEX (BMI) DOCUMENTED: ICD-10-PCS | Mod: CPTII,,, | Performed by: STUDENT IN AN ORGANIZED HEALTH CARE EDUCATION/TRAINING PROGRAM

## 2022-06-08 PROCEDURE — 99214 PR OFFICE/OUTPT VISIT, EST, LEVL IV, 30-39 MIN: ICD-10-PCS | Mod: S$PBB,,, | Performed by: STUDENT IN AN ORGANIZED HEALTH CARE EDUCATION/TRAINING PROGRAM

## 2022-06-08 PROCEDURE — 99213 OFFICE O/P EST LOW 20 MIN: CPT | Mod: PBBFAC | Performed by: STUDENT IN AN ORGANIZED HEALTH CARE EDUCATION/TRAINING PROGRAM

## 2022-06-08 PROCEDURE — 99999 PR PBB SHADOW E&M-EST. PATIENT-LVL III: CPT | Mod: PBBFAC,,, | Performed by: STUDENT IN AN ORGANIZED HEALTH CARE EDUCATION/TRAINING PROGRAM

## 2022-06-08 PROCEDURE — 1160F PR REVIEW ALL MEDS BY PRESCRIBER/CLIN PHARMACIST DOCUMENTED: ICD-10-PCS | Mod: CPTII,,, | Performed by: STUDENT IN AN ORGANIZED HEALTH CARE EDUCATION/TRAINING PROGRAM

## 2022-06-08 PROCEDURE — 1159F MED LIST DOCD IN RCRD: CPT | Mod: CPTII,,, | Performed by: STUDENT IN AN ORGANIZED HEALTH CARE EDUCATION/TRAINING PROGRAM

## 2022-06-08 PROCEDURE — 3074F SYST BP LT 130 MM HG: CPT | Mod: CPTII,,, | Performed by: STUDENT IN AN ORGANIZED HEALTH CARE EDUCATION/TRAINING PROGRAM

## 2022-06-08 PROCEDURE — 83036 HEMOGLOBIN GLYCOSYLATED A1C: CPT | Performed by: STUDENT IN AN ORGANIZED HEALTH CARE EDUCATION/TRAINING PROGRAM

## 2022-06-08 PROCEDURE — 3074F PR MOST RECENT SYSTOLIC BLOOD PRESSURE < 130 MM HG: ICD-10-PCS | Mod: CPTII,,, | Performed by: STUDENT IN AN ORGANIZED HEALTH CARE EDUCATION/TRAINING PROGRAM

## 2022-06-08 PROCEDURE — 3079F DIAST BP 80-89 MM HG: CPT | Mod: CPTII,,, | Performed by: STUDENT IN AN ORGANIZED HEALTH CARE EDUCATION/TRAINING PROGRAM

## 2022-06-08 PROCEDURE — 3008F BODY MASS INDEX DOCD: CPT | Mod: CPTII,,, | Performed by: STUDENT IN AN ORGANIZED HEALTH CARE EDUCATION/TRAINING PROGRAM

## 2022-06-08 PROCEDURE — 84443 ASSAY THYROID STIM HORMONE: CPT | Performed by: STUDENT IN AN ORGANIZED HEALTH CARE EDUCATION/TRAINING PROGRAM

## 2022-06-08 PROCEDURE — 36415 COLL VENOUS BLD VENIPUNCTURE: CPT | Mod: PBBFAC

## 2022-06-08 PROCEDURE — 1160F RVW MEDS BY RX/DR IN RCRD: CPT | Mod: CPTII,,, | Performed by: STUDENT IN AN ORGANIZED HEALTH CARE EDUCATION/TRAINING PROGRAM

## 2022-06-08 PROCEDURE — 3079F PR MOST RECENT DIASTOLIC BLOOD PRESSURE 80-89 MM HG: ICD-10-PCS | Mod: CPTII,,, | Performed by: STUDENT IN AN ORGANIZED HEALTH CARE EDUCATION/TRAINING PROGRAM

## 2022-06-08 PROCEDURE — 99214 OFFICE O/P EST MOD 30 MIN: CPT | Mod: S$PBB,,, | Performed by: STUDENT IN AN ORGANIZED HEALTH CARE EDUCATION/TRAINING PROGRAM

## 2022-06-08 RX ORDER — HYDROCHLOROTHIAZIDE 25 MG/1
25 TABLET ORAL DAILY
Qty: 90 TABLET | Refills: 3 | Status: SHIPPED | OUTPATIENT
Start: 2022-06-08 | End: 2023-08-08 | Stop reason: SDUPTHER

## 2022-06-08 NOTE — PROGRESS NOTES
Subjective:       Patient ID: Rocio Arriaga is a 51 y.o. female.    Chief Complaint: Follow-up (6 month f/u)    Pt here for follow-up.    HTN: stable on HCTZ 25mg daily.     Psoriatic arthritis: she is scheduled to see rheumatology at [facility] next week.    Needs mammogram, states it is hard to take of work.     Needs colon cancer screening. cologuard ordered, states she forgot to submit.     Review of Systems   Constitutional: Negative for chills and fever.   HENT: Negative for congestion and sore throat.    Respiratory: Negative for cough and shortness of breath.    Cardiovascular: Negative for chest pain.   Gastrointestinal: Negative for abdominal pain, diarrhea, nausea and vomiting.   Genitourinary: Negative for dysuria and hematuria.   Neurological: Negative for dizziness, syncope and light-headedness.       Objective:      Physical Exam   Constitutional: No distress.   HENT:   Head: Normocephalic and atraumatic.   Eyes: Conjunctivae are normal.   Neck:   Nodule anterior neck just left of midline   Cardiovascular: Normal rate, regular rhythm and normal heart sounds.   No murmur heard.  Pulmonary/Chest: Effort normal and breath sounds normal. No respiratory distress.   Neurological: She is alert.   Psychiatric: Her behavior is normal. Mood normal.   Vitals reviewed.      Assessment:       1. Essential hypertension    2. Psoriatic arthritis    3. Nodule of neck        Plan:       Essential hypertension  -     Comprehensive Metabolic Panel; Future; Expected date: 06/08/2022  -     hydroCHLOROthiazide (HYDRODIURIL) 25 MG tablet; Take 1 tablet (25 mg total) by mouth once daily.  Dispense: 90 tablet; Refill: 3  -     TSH; Future; Expected date: 06/08/2022    Psoriatic arthritis  -     Hemoglobin A1C; Future; Expected date: 06/08/2022    Nodule of neck  -     US Soft Tissue Head Neck Thyroid; Future; Expected date: 06/08/2022    labs as ordered  Cont current meds  Needs to schedule mammogram  Needs to submit  cologuard  Follow-up rheum as scheduled  Need US of left neck nodule, order placed  RTC for worsening symptoms or failure to improve, or RTC PRN/as scheduled

## 2022-06-09 LAB
ESTIMATED AVG GLUCOSE: 108 MG/DL (ref 68–131)
HBA1C MFR BLD: 5.4 % (ref 4–5.6)

## 2022-06-23 ENCOUNTER — HOSPITAL ENCOUNTER (OUTPATIENT)
Dept: RADIOLOGY | Facility: HOSPITAL | Age: 52
Discharge: HOME | End: 2022-06-23
Attending: STUDENT IN AN ORGANIZED HEALTH CARE EDUCATION/TRAINING PROGRAM
Payer: MEDICAID

## 2022-06-23 DIAGNOSIS — R22.1 NODULE OF NECK: ICD-10-CM

## 2022-06-23 PROCEDURE — 76536 US SOFT TISSUE HEAD NECK THYROID: ICD-10-PCS | Mod: 26,,, | Performed by: RADIOLOGY

## 2022-06-23 PROCEDURE — 76536 US EXAM OF HEAD AND NECK: CPT | Mod: TC

## 2022-06-23 PROCEDURE — 76536 US EXAM OF HEAD AND NECK: CPT | Mod: 26,,, | Performed by: RADIOLOGY

## 2022-07-11 ENCOUNTER — PATIENT MESSAGE (OUTPATIENT)
Dept: ADMINISTRATIVE | Facility: HOSPITAL | Age: 52
End: 2022-07-11
Payer: MEDICAID

## 2022-08-02 ENCOUNTER — PATIENT OUTREACH (OUTPATIENT)
Dept: ADMINISTRATIVE | Facility: HOSPITAL | Age: 52
End: 2022-08-02
Payer: MEDICAID

## 2022-09-19 ENCOUNTER — PATIENT MESSAGE (OUTPATIENT)
Dept: ADMINISTRATIVE | Facility: HOSPITAL | Age: 52
End: 2022-09-19
Payer: MEDICAID

## 2022-10-03 ENCOUNTER — PATIENT MESSAGE (OUTPATIENT)
Dept: ADMINISTRATIVE | Facility: HOSPITAL | Age: 52
End: 2022-10-03
Payer: MEDICAID

## 2022-10-17 ENCOUNTER — PATIENT OUTREACH (OUTPATIENT)
Dept: ADMINISTRATIVE | Facility: HOSPITAL | Age: 52
End: 2022-10-17
Payer: MEDICAID

## 2022-11-18 ENCOUNTER — PATIENT OUTREACH (OUTPATIENT)
Dept: ADMINISTRATIVE | Facility: HOSPITAL | Age: 52
End: 2022-11-18
Payer: MEDICAID

## 2022-12-05 ENCOUNTER — PATIENT OUTREACH (OUTPATIENT)
Dept: ADMINISTRATIVE | Facility: HOSPITAL | Age: 52
End: 2022-12-05
Payer: MEDICAID

## 2023-01-09 ENCOUNTER — PATIENT MESSAGE (OUTPATIENT)
Dept: ADMINISTRATIVE | Facility: HOSPITAL | Age: 53
End: 2023-01-09
Payer: MEDICAID

## 2023-01-24 ENCOUNTER — PATIENT OUTREACH (OUTPATIENT)
Dept: ADMINISTRATIVE | Facility: HOSPITAL | Age: 53
End: 2023-01-24
Payer: MEDICAID

## 2023-01-24 NOTE — PROGRESS NOTES
Chart reviewed, immunization record updated.  No new results noted on [facility] or [facility] web site.  Care Everywhere unavailable.  Patient care coordination note  Upcoming PCP visit updated.  Next PCP visit 2/03/2023.  Attempted to contact patient to discuss Cervical & Colorectal Cancer Screening and scheduling MMG, patient states she will call back to discuss.

## 2023-02-03 ENCOUNTER — OFFICE VISIT (OUTPATIENT)
Dept: FAMILY MEDICINE | Facility: CLINIC | Age: 53
End: 2023-02-03
Payer: MEDICAID

## 2023-02-03 VITALS
WEIGHT: 207.13 LBS | HEIGHT: 66 IN | BODY MASS INDEX: 33.29 KG/M2 | RESPIRATION RATE: 16 BRPM | DIASTOLIC BLOOD PRESSURE: 82 MMHG | SYSTOLIC BLOOD PRESSURE: 130 MMHG | HEART RATE: 80 BPM

## 2023-02-03 DIAGNOSIS — L40.50 PSORIATIC ARTHRITIS: ICD-10-CM

## 2023-02-03 DIAGNOSIS — I10 ESSENTIAL HYPERTENSION: Primary | ICD-10-CM

## 2023-02-03 PROCEDURE — 3075F PR MOST RECENT SYSTOLIC BLOOD PRESS GE 130-139MM HG: ICD-10-PCS | Mod: CPTII,,, | Performed by: STUDENT IN AN ORGANIZED HEALTH CARE EDUCATION/TRAINING PROGRAM

## 2023-02-03 PROCEDURE — 3075F SYST BP GE 130 - 139MM HG: CPT | Mod: CPTII,,, | Performed by: STUDENT IN AN ORGANIZED HEALTH CARE EDUCATION/TRAINING PROGRAM

## 2023-02-03 PROCEDURE — 3008F BODY MASS INDEX DOCD: CPT | Mod: CPTII,,, | Performed by: STUDENT IN AN ORGANIZED HEALTH CARE EDUCATION/TRAINING PROGRAM

## 2023-02-03 PROCEDURE — 99213 PR OFFICE/OUTPT VISIT, EST, LEVL III, 20-29 MIN: ICD-10-PCS | Mod: S$PBB,,, | Performed by: STUDENT IN AN ORGANIZED HEALTH CARE EDUCATION/TRAINING PROGRAM

## 2023-02-03 PROCEDURE — 3008F PR BODY MASS INDEX (BMI) DOCUMENTED: ICD-10-PCS | Mod: CPTII,,, | Performed by: STUDENT IN AN ORGANIZED HEALTH CARE EDUCATION/TRAINING PROGRAM

## 2023-02-03 PROCEDURE — 3079F DIAST BP 80-89 MM HG: CPT | Mod: CPTII,,, | Performed by: STUDENT IN AN ORGANIZED HEALTH CARE EDUCATION/TRAINING PROGRAM

## 2023-02-03 PROCEDURE — 1159F MED LIST DOCD IN RCRD: CPT | Mod: CPTII,,, | Performed by: STUDENT IN AN ORGANIZED HEALTH CARE EDUCATION/TRAINING PROGRAM

## 2023-02-03 PROCEDURE — 99213 OFFICE O/P EST LOW 20 MIN: CPT | Mod: PBBFAC | Performed by: STUDENT IN AN ORGANIZED HEALTH CARE EDUCATION/TRAINING PROGRAM

## 2023-02-03 PROCEDURE — 99999 PR PBB SHADOW E&M-EST. PATIENT-LVL III: CPT | Mod: PBBFAC,,, | Performed by: STUDENT IN AN ORGANIZED HEALTH CARE EDUCATION/TRAINING PROGRAM

## 2023-02-03 PROCEDURE — 99213 OFFICE O/P EST LOW 20 MIN: CPT | Mod: S$PBB,,, | Performed by: STUDENT IN AN ORGANIZED HEALTH CARE EDUCATION/TRAINING PROGRAM

## 2023-02-03 PROCEDURE — 3079F PR MOST RECENT DIASTOLIC BLOOD PRESSURE 80-89 MM HG: ICD-10-PCS | Mod: CPTII,,, | Performed by: STUDENT IN AN ORGANIZED HEALTH CARE EDUCATION/TRAINING PROGRAM

## 2023-02-03 PROCEDURE — 99999 PR PBB SHADOW E&M-EST. PATIENT-LVL III: ICD-10-PCS | Mod: PBBFAC,,, | Performed by: STUDENT IN AN ORGANIZED HEALTH CARE EDUCATION/TRAINING PROGRAM

## 2023-02-03 PROCEDURE — 1159F PR MEDICATION LIST DOCUMENTED IN MEDICAL RECORD: ICD-10-PCS | Mod: CPTII,,, | Performed by: STUDENT IN AN ORGANIZED HEALTH CARE EDUCATION/TRAINING PROGRAM

## 2023-02-03 NOTE — PROGRESS NOTES
Subjective:       Patient ID: Rocio Arriaga is a 52 y.o. female.    Chief Complaint: Follow-up (Pt would like to schedule lab work and check on cereuses)    Pt here for follow-up    HTN: doing well with HCTZ. BP well controlled.     Psoriasis/psoriatic arthritis. She had to reschedule her rheum appt. She is having worsening rash and joint pain. She is nervous to see rheum as she is worried they cant help or she wouldn't be able to afford the meds.    She was doing glutathione injections and is wondering if her levels can be checked. She is having issues affording the injections as they were costing her $200/month. She was buying online. She felt the injections helped, but she cannot afford them regularly.     Review of Systems   Constitutional:  Positive for activity change. Negative for unexpected weight change.   HENT:  Negative for hearing loss, rhinorrhea and trouble swallowing.    Eyes:  Negative for discharge and visual disturbance.   Respiratory:  Negative for chest tightness and wheezing.    Cardiovascular:  Negative for chest pain and palpitations.   Gastrointestinal:  Negative for blood in stool, constipation, diarrhea and vomiting.   Endocrine: Negative for polydipsia and polyuria.   Genitourinary:  Negative for difficulty urinating, dysuria, hematuria and menstrual problem.   Musculoskeletal:  Negative for arthralgias, joint swelling and neck pain.   Neurological:  Negative for weakness and headaches.   Psychiatric/Behavioral:  Positive for dysphoric mood. Negative for confusion.      Objective:      Physical Exam   Constitutional: No distress.   HENT:   Head: Normocephalic and atraumatic.   Eyes: Conjunctivae are normal.   Neck:   Nodule anterior neck just left of midline   Cardiovascular: Normal rate, regular rhythm and normal heart sounds.   No murmur heard.  Pulmonary/Chest: Effort normal and breath sounds normal. No respiratory distress.   Neurological: She is alert.   Psychiatric: Her behavior is  normal. Mood normal.   Vitals reviewed.    Assessment:       1. Essential hypertension    2. Psoriatic arthritis        Plan:           1. Essential hypertension  -     Basic Metabolic Panel; Future; Expected date: 02/03/2023  -     CBC Auto Differential; Future; Expected date: 02/03/2023    2. Psoriatic arthritis  -     Glutathione, Total; Future; Expected date: 02/03/2023  -     Basic Metabolic Panel; Future; Expected date: 02/03/2023  -     CBC Auto Differential; Future; Expected date: 02/03/2023    Cont HCTZ  Follow-up rheum as scheduled  Follow-up derm  RTC 6 months or sooner if needed

## 2023-02-06 ENCOUNTER — LAB VISIT (OUTPATIENT)
Dept: LAB | Facility: HOSPITAL | Age: 53
End: 2023-02-06
Attending: STUDENT IN AN ORGANIZED HEALTH CARE EDUCATION/TRAINING PROGRAM
Payer: MEDICAID

## 2023-02-06 DIAGNOSIS — L40.50 PSORIATIC ARTHRITIS: ICD-10-CM

## 2023-02-06 DIAGNOSIS — I10 ESSENTIAL HYPERTENSION: ICD-10-CM

## 2023-02-06 LAB
ANION GAP SERPL CALC-SCNC: 10 MMOL/L (ref 8–16)
BASOPHILS # BLD AUTO: 0.05 K/UL (ref 0–0.2)
BASOPHILS NFR BLD: 0.8 % (ref 0–1.9)
BUN SERPL-MCNC: 12 MG/DL (ref 6–20)
CALCIUM SERPL-MCNC: 9.3 MG/DL (ref 8.7–10.5)
CHLORIDE SERPL-SCNC: 107 MMOL/L (ref 95–110)
CO2 SERPL-SCNC: 24 MMOL/L (ref 23–29)
CREAT SERPL-MCNC: 0.9 MG/DL (ref 0.5–1.4)
DIFFERENTIAL METHOD: NORMAL
EOSINOPHIL # BLD AUTO: 0.2 K/UL (ref 0–0.5)
EOSINOPHIL NFR BLD: 3.5 % (ref 0–8)
ERYTHROCYTE [DISTWIDTH] IN BLOOD BY AUTOMATED COUNT: 13.5 % (ref 11.5–14.5)
EST. GFR  (NO RACE VARIABLE): >60 ML/MIN/1.73 M^2
GLUCOSE SERPL-MCNC: 109 MG/DL (ref 70–110)
HCT VFR BLD AUTO: 42.1 % (ref 37–48.5)
HGB BLD-MCNC: 13.7 G/DL (ref 12–16)
IMM GRANULOCYTES # BLD AUTO: 0.01 K/UL (ref 0–0.04)
IMM GRANULOCYTES NFR BLD AUTO: 0.2 % (ref 0–0.5)
LYMPHOCYTES # BLD AUTO: 1.8 K/UL (ref 1–4.8)
LYMPHOCYTES NFR BLD: 29.5 % (ref 18–48)
MCH RBC QN AUTO: 27.9 PG (ref 27–31)
MCHC RBC AUTO-ENTMCNC: 32.5 G/DL (ref 32–36)
MCV RBC AUTO: 86 FL (ref 82–98)
MONOCYTES # BLD AUTO: 0.5 K/UL (ref 0.3–1)
MONOCYTES NFR BLD: 7.7 % (ref 4–15)
NEUTROPHILS # BLD AUTO: 3.5 K/UL (ref 1.8–7.7)
NEUTROPHILS NFR BLD: 58.3 % (ref 38–73)
NRBC BLD-RTO: 0 /100 WBC
PLATELET # BLD AUTO: 350 K/UL (ref 150–450)
PMV BLD AUTO: 9.7 FL (ref 9.2–12.9)
POTASSIUM SERPL-SCNC: 4 MMOL/L (ref 3.5–5.1)
RBC # BLD AUTO: 4.91 M/UL (ref 4–5.4)
SODIUM SERPL-SCNC: 141 MMOL/L (ref 136–145)
WBC # BLD AUTO: 6.07 K/UL (ref 3.9–12.7)

## 2023-02-06 PROCEDURE — 82978 ASSAY OF GLUTATHIONE: CPT | Performed by: STUDENT IN AN ORGANIZED HEALTH CARE EDUCATION/TRAINING PROGRAM

## 2023-02-06 PROCEDURE — 85025 COMPLETE CBC W/AUTO DIFF WBC: CPT | Performed by: STUDENT IN AN ORGANIZED HEALTH CARE EDUCATION/TRAINING PROGRAM

## 2023-02-06 PROCEDURE — 36415 COLL VENOUS BLD VENIPUNCTURE: CPT | Performed by: STUDENT IN AN ORGANIZED HEALTH CARE EDUCATION/TRAINING PROGRAM

## 2023-02-06 PROCEDURE — 80048 BASIC METABOLIC PNL TOTAL CA: CPT | Performed by: STUDENT IN AN ORGANIZED HEALTH CARE EDUCATION/TRAINING PROGRAM

## 2023-02-20 LAB — GLUTATHIONE BLD-SCNC: 419 UM (ref 373–838)

## 2023-03-28 ENCOUNTER — PATIENT OUTREACH (OUTPATIENT)
Dept: ADMINISTRATIVE | Facility: HOSPITAL | Age: 53
End: 2023-03-28
Payer: MEDICAID

## 2023-03-28 DIAGNOSIS — Z12.31 SCREENING MAMMOGRAM, ENCOUNTER FOR: Primary | ICD-10-CM

## 2023-04-03 ENCOUNTER — PATIENT MESSAGE (OUTPATIENT)
Dept: ADMINISTRATIVE | Facility: HOSPITAL | Age: 53
End: 2023-04-03
Payer: MEDICAID

## 2023-04-18 ENCOUNTER — TELEPHONE (OUTPATIENT)
Dept: FAMILY MEDICINE | Facility: CLINIC | Age: 53
End: 2023-04-18
Payer: MEDICAID

## 2023-04-18 NOTE — TELEPHONE ENCOUNTER
----- Message from Rocio Hsu sent at 2023 10:44 AM CDT -----  Contact: self  Rocio Arriaga  MRN: [id number]  : 1970  PCP: Pro Gallagher  Home Phone      [phone]  Work Phone      Not on file.  Mobile          [phone]      MESSAGE:   Pt called asking about a referral they discussed to see rheumatalogist.       Phone:  [phone]

## 2023-05-03 ENCOUNTER — PATIENT MESSAGE (OUTPATIENT)
Dept: ADMINISTRATIVE | Facility: HOSPITAL | Age: 53
End: 2023-05-03
Payer: MEDICAID

## 2023-05-10 ENCOUNTER — TELEPHONE (OUTPATIENT)
Dept: FAMILY MEDICINE | Facility: CLINIC | Age: 53
End: 2023-05-10
Payer: MEDICAID

## 2023-05-10 DIAGNOSIS — L40.50 PSORIATIC ARTHRITIS: Primary | ICD-10-CM

## 2023-05-10 NOTE — TELEPHONE ENCOUNTER
----- Message from Dileep Valdez sent at 2023 10:05 AM CDT -----  Contact: Patient  Rocio Arriaga  MRN: [id number]  : 1970  PCP: Pro Gallagher  Home Phone      [phone]  Work Phone      Not on file.  Mobile          [phone]      MESSAGE: referral to [facility] was supposedly sent to [facility] on 23-- they state they still have not received anything -- please advise of status     Call [phone]    PCP: Elliott

## 2023-05-16 ENCOUNTER — PATIENT OUTREACH (OUTPATIENT)
Dept: ADMINISTRATIVE | Facility: HOSPITAL | Age: 53
End: 2023-05-16
Payer: MEDICAID

## 2023-05-16 DIAGNOSIS — Z12.11 COLON CANCER SCREENING: Primary | ICD-10-CM

## 2023-05-16 NOTE — PROGRESS NOTES
Chart reviewed, immunization record updated.  No new results noted on [facility] or [facility] web site.  Care Everywhere unavailable.  Patient care coordination note  Upcoming PCP visit updated.  Next PCP visit 8/08/2023.  MMG scheduled for 8/08/2023.  Fit Kit order placed.   Discussed Colorectal Cancer Screening options, patient opted for Fit Kit, mailed to patient.  FitKit was given to patient on 5/16/2023 2:48 PM   Patient declined to schedule Cervical Cancer Screening at this time.

## 2023-07-11 ENCOUNTER — PATIENT MESSAGE (OUTPATIENT)
Dept: ADMINISTRATIVE | Facility: HOSPITAL | Age: 53
End: 2023-07-11
Payer: MEDICAID

## 2023-07-26 ENCOUNTER — PATIENT OUTREACH (OUTPATIENT)
Dept: ADMINISTRATIVE | Facility: HOSPITAL | Age: 53
End: 2023-07-26
Payer: MEDICAID

## 2023-07-26 NOTE — PROGRESS NOTES
Chart reviewed, immunization record updated.  No new results noted on [facility] or [facility] web site.  Care Everywhere unavailable.  Patient care coordination note  Upcoming PCP visit updated.  Next PCP visit 8/8/2023.  Patient has MMG scheduled on 8/8/2023.  Discussed Cervical Cancer Screening with patient, patient declines to schedule.

## 2023-08-08 ENCOUNTER — OFFICE VISIT (OUTPATIENT)
Dept: FAMILY MEDICINE | Facility: CLINIC | Age: 53
End: 2023-08-08
Payer: MEDICAID

## 2023-08-08 ENCOUNTER — HOSPITAL ENCOUNTER (OUTPATIENT)
Dept: RADIOLOGY | Facility: HOSPITAL | Age: 53
Discharge: HOME | End: 2023-08-08
Attending: STUDENT IN AN ORGANIZED HEALTH CARE EDUCATION/TRAINING PROGRAM
Payer: MEDICAID

## 2023-08-08 VITALS
DIASTOLIC BLOOD PRESSURE: 72 MMHG | RESPIRATION RATE: 66 BRPM | SYSTOLIC BLOOD PRESSURE: 120 MMHG | BODY MASS INDEX: 32.45 KG/M2 | HEART RATE: 78 BPM | WEIGHT: 201.06 LBS

## 2023-08-08 VITALS — BODY MASS INDEX: 33.27 KG/M2 | WEIGHT: 207 LBS | HEIGHT: 66 IN

## 2023-08-08 DIAGNOSIS — E66.9 CLASS 1 OBESITY WITH SERIOUS COMORBIDITY AND BODY MASS INDEX (BMI) OF 32.0 TO 32.9 IN ADULT, UNSPECIFIED OBESITY TYPE: ICD-10-CM

## 2023-08-08 DIAGNOSIS — L40.9 PSORIASIS: ICD-10-CM

## 2023-08-08 DIAGNOSIS — I10 ESSENTIAL HYPERTENSION: Primary | ICD-10-CM

## 2023-08-08 DIAGNOSIS — Z12.31 SCREENING MAMMOGRAM, ENCOUNTER FOR: ICD-10-CM

## 2023-08-08 DIAGNOSIS — L40.50 PSORIATIC ARTHRITIS: ICD-10-CM

## 2023-08-08 DIAGNOSIS — Z13.1 DIABETES MELLITUS SCREENING: ICD-10-CM

## 2023-08-08 PROCEDURE — 99999 PR PBB SHADOW E&M-EST. PATIENT-LVL III: CPT | Mod: PBBFAC,,, | Performed by: STUDENT IN AN ORGANIZED HEALTH CARE EDUCATION/TRAINING PROGRAM

## 2023-08-08 PROCEDURE — 3008F PR BODY MASS INDEX (BMI) DOCUMENTED: ICD-10-PCS | Mod: CPTII,,, | Performed by: STUDENT IN AN ORGANIZED HEALTH CARE EDUCATION/TRAINING PROGRAM

## 2023-08-08 PROCEDURE — 3008F BODY MASS INDEX DOCD: CPT | Mod: CPTII,,, | Performed by: STUDENT IN AN ORGANIZED HEALTH CARE EDUCATION/TRAINING PROGRAM

## 2023-08-08 PROCEDURE — 77063 BREAST TOMOSYNTHESIS BI: CPT | Mod: 26,,, | Performed by: RADIOLOGY

## 2023-08-08 PROCEDURE — 3078F DIAST BP <80 MM HG: CPT | Mod: CPTII,,, | Performed by: STUDENT IN AN ORGANIZED HEALTH CARE EDUCATION/TRAINING PROGRAM

## 2023-08-08 PROCEDURE — 77067 SCR MAMMO BI INCL CAD: CPT | Mod: 26,,, | Performed by: RADIOLOGY

## 2023-08-08 PROCEDURE — 77063 MAMMO DIGITAL SCREENING BILAT WITH TOMO: ICD-10-PCS | Mod: 26,,, | Performed by: RADIOLOGY

## 2023-08-08 PROCEDURE — 77067 SCR MAMMO BI INCL CAD: CPT | Mod: TC

## 2023-08-08 PROCEDURE — 1159F MED LIST DOCD IN RCRD: CPT | Mod: CPTII,,, | Performed by: STUDENT IN AN ORGANIZED HEALTH CARE EDUCATION/TRAINING PROGRAM

## 2023-08-08 PROCEDURE — 99213 PR OFFICE/OUTPT VISIT, EST, LEVL III, 20-29 MIN: ICD-10-PCS | Mod: S$PBB,,, | Performed by: STUDENT IN AN ORGANIZED HEALTH CARE EDUCATION/TRAINING PROGRAM

## 2023-08-08 PROCEDURE — 99213 OFFICE O/P EST LOW 20 MIN: CPT | Mod: PBBFAC | Performed by: STUDENT IN AN ORGANIZED HEALTH CARE EDUCATION/TRAINING PROGRAM

## 2023-08-08 PROCEDURE — 1160F RVW MEDS BY RX/DR IN RCRD: CPT | Mod: CPTII,,, | Performed by: STUDENT IN AN ORGANIZED HEALTH CARE EDUCATION/TRAINING PROGRAM

## 2023-08-08 PROCEDURE — 3078F PR MOST RECENT DIASTOLIC BLOOD PRESSURE < 80 MM HG: ICD-10-PCS | Mod: CPTII,,, | Performed by: STUDENT IN AN ORGANIZED HEALTH CARE EDUCATION/TRAINING PROGRAM

## 2023-08-08 PROCEDURE — 3074F SYST BP LT 130 MM HG: CPT | Mod: CPTII,,, | Performed by: STUDENT IN AN ORGANIZED HEALTH CARE EDUCATION/TRAINING PROGRAM

## 2023-08-08 PROCEDURE — 77067 MAMMO DIGITAL SCREENING BILAT WITH TOMO: ICD-10-PCS | Mod: 26,,, | Performed by: RADIOLOGY

## 2023-08-08 PROCEDURE — 99213 OFFICE O/P EST LOW 20 MIN: CPT | Mod: S$PBB,,, | Performed by: STUDENT IN AN ORGANIZED HEALTH CARE EDUCATION/TRAINING PROGRAM

## 2023-08-08 PROCEDURE — 99999 PR PBB SHADOW E&M-EST. PATIENT-LVL III: ICD-10-PCS | Mod: PBBFAC,,, | Performed by: STUDENT IN AN ORGANIZED HEALTH CARE EDUCATION/TRAINING PROGRAM

## 2023-08-08 PROCEDURE — 1159F PR MEDICATION LIST DOCUMENTED IN MEDICAL RECORD: ICD-10-PCS | Mod: CPTII,,, | Performed by: STUDENT IN AN ORGANIZED HEALTH CARE EDUCATION/TRAINING PROGRAM

## 2023-08-08 PROCEDURE — 1160F PR REVIEW ALL MEDS BY PRESCRIBER/CLIN PHARMACIST DOCUMENTED: ICD-10-PCS | Mod: CPTII,,, | Performed by: STUDENT IN AN ORGANIZED HEALTH CARE EDUCATION/TRAINING PROGRAM

## 2023-08-08 PROCEDURE — 3074F PR MOST RECENT SYSTOLIC BLOOD PRESSURE < 130 MM HG: ICD-10-PCS | Mod: CPTII,,, | Performed by: STUDENT IN AN ORGANIZED HEALTH CARE EDUCATION/TRAINING PROGRAM

## 2023-08-08 RX ORDER — HYDROCHLOROTHIAZIDE 25 MG/1
25 TABLET ORAL DAILY
Qty: 90 TABLET | Refills: 3 | Status: SHIPPED | OUTPATIENT
Start: 2023-08-08 | End: 2024-01-10

## 2023-08-08 RX ORDER — MOMETASONE FUROATE 1 MG/G
CREAM TOPICAL
Qty: 45 G | Refills: 2 | Status: SHIPPED | OUTPATIENT
Start: 2023-08-08

## 2023-08-08 NOTE — PROGRESS NOTES
Subjective:       Patient ID: Rocio Arriaga is a 53 y.o. female.    Chief Complaint: Follow-up    Pt here for follow-up    HTN: doing well with HCTZ. BP well controlled.     Psoriasis/psoriatic arthritis. She had to reschedule her rheum appt. She is having worsening rash and joint pain. She is nervous to see rheum as she is worried they cant help or she wouldn't be able to afford the meds. She is requesting refill of mometasone topical which she uses PRN flares.    She was doing glutathione injections. She is having issues affording the injections as they were costing her $200/month. She was buying online. She felt the injections helped, but she cannot afford them regularly.     Review of Systems   Constitutional:  Negative for chills and fever.   HENT:  Negative for congestion and sore throat.    Respiratory:  Negative for cough, shortness of breath and wheezing.    Cardiovascular:  Negative for chest pain.   Gastrointestinal:  Negative for abdominal pain, diarrhea and vomiting.   Genitourinary:  Negative for dysuria and hematuria.   Musculoskeletal:  Positive for arthralgias.   Neurological:  Negative for dizziness and syncope.       Objective:      Physical Exam  Vitals reviewed.   Constitutional:       General: She is not in acute distress.  HENT:      Head: Normocephalic and atraumatic.   Eyes:      Conjunctiva/sclera: Conjunctivae normal.   Cardiovascular:      Rate and Rhythm: Normal rate and regular rhythm.      Heart sounds: Normal heart sounds. No murmur heard.  Pulmonary:      Effort: Pulmonary effort is normal. No respiratory distress.      Breath sounds: Normal breath sounds.   Neurological:      Mental Status: She is alert.   Psychiatric:         Mood and Affect: Mood normal.         Behavior: Behavior normal.         Assessment:       1. Essential hypertension    2. Psoriatic arthritis    3. Psoriasis    4. Diabetes mellitus screening    5. Class 1 obesity with serious comorbidity and body mass  index (BMI) of 32.0 to 32.9 in adult, unspecified obesity type          Plan:           1. Essential hypertension  -     Comprehensive Metabolic Panel; Future; Expected date: 08/08/2023  -     TSH; Future; Expected date: 08/08/2023  -     LIPID PANEL; Future; Expected date: 08/08/2023  -     hydroCHLOROthiazide (HYDRODIURIL) 25 MG tablet; Take 1 tablet (25 mg total) by mouth once daily.  Dispense: 90 tablet; Refill: 3    2. Psoriatic arthritis    3. Psoriasis  -     mometasone 0.1% (ELOCON) 0.1 % cream; APPLY TO AFFECTED AREAS AT BEDTIME AS NEEDED FOR FLARE  Dispense: 45 g; Refill: 2    4. Diabetes mellitus screening  -     Hemoglobin A1C; Future; Expected date: 08/08/2023    5. Class 1 obesity with serious comorbidity and body mass index (BMI) of 32.0 to 32.9 in adult, unspecified obesity type  -     Hemoglobin A1C; Future; Expected date: 08/08/2023    Labs as ordered  Cont HCTZ  Follow-up rheum as scheduled  Follow-up derm  RTC 6 months or sooner if needed

## 2023-08-15 ENCOUNTER — LAB VISIT (OUTPATIENT)
Dept: LAB | Facility: HOSPITAL | Age: 53
End: 2023-08-15
Attending: STUDENT IN AN ORGANIZED HEALTH CARE EDUCATION/TRAINING PROGRAM
Payer: MEDICAID

## 2023-08-15 DIAGNOSIS — E66.9 CLASS 1 OBESITY WITH SERIOUS COMORBIDITY AND BODY MASS INDEX (BMI) OF 32.0 TO 32.9 IN ADULT, UNSPECIFIED OBESITY TYPE: ICD-10-CM

## 2023-08-15 DIAGNOSIS — I10 ESSENTIAL HYPERTENSION: ICD-10-CM

## 2023-08-15 DIAGNOSIS — Z13.1 DIABETES MELLITUS SCREENING: ICD-10-CM

## 2023-08-15 LAB
ALBUMIN SERPL BCP-MCNC: 3.7 G/DL (ref 3.5–5.2)
ALP SERPL-CCNC: 86 U/L (ref 55–135)
ALT SERPL W/O P-5'-P-CCNC: 15 U/L (ref 10–44)
ANION GAP SERPL CALC-SCNC: 10 MMOL/L (ref 8–16)
AST SERPL-CCNC: 22 U/L (ref 10–40)
BILIRUB SERPL-MCNC: 0.6 MG/DL (ref 0.1–1)
BUN SERPL-MCNC: 7 MG/DL (ref 6–20)
CALCIUM SERPL-MCNC: 9.2 MG/DL (ref 8.7–10.5)
CHLORIDE SERPL-SCNC: 106 MMOL/L (ref 95–110)
CHOLEST SERPL-MCNC: 153 MG/DL (ref 120–199)
CHOLEST/HDLC SERPL: 3.3 {RATIO} (ref 2–5)
CO2 SERPL-SCNC: 25 MMOL/L (ref 23–29)
CREAT SERPL-MCNC: 0.9 MG/DL (ref 0.5–1.4)
EST. GFR  (NO RACE VARIABLE): >60 ML/MIN/1.73 M^2
ESTIMATED AVG GLUCOSE: 105 MG/DL (ref 68–131)
GLUCOSE SERPL-MCNC: 103 MG/DL (ref 70–110)
HBA1C MFR BLD: 5.3 % (ref 4–5.6)
HDLC SERPL-MCNC: 46 MG/DL (ref 40–75)
HDLC SERPL: 30.1 % (ref 20–50)
LDLC SERPL CALC-MCNC: 91.2 MG/DL (ref 63–159)
NONHDLC SERPL-MCNC: 107 MG/DL
POTASSIUM SERPL-SCNC: 3.7 MMOL/L (ref 3.5–5.1)
PROT SERPL-MCNC: 7.5 G/DL (ref 6–8.4)
SODIUM SERPL-SCNC: 141 MMOL/L (ref 136–145)
TRIGL SERPL-MCNC: 79 MG/DL (ref 30–150)
TSH SERPL DL<=0.005 MIU/L-ACNC: 3.36 UIU/ML (ref 0.4–4)

## 2023-08-15 PROCEDURE — 36415 COLL VENOUS BLD VENIPUNCTURE: CPT | Performed by: STUDENT IN AN ORGANIZED HEALTH CARE EDUCATION/TRAINING PROGRAM

## 2023-08-15 PROCEDURE — 83036 HEMOGLOBIN GLYCOSYLATED A1C: CPT | Performed by: STUDENT IN AN ORGANIZED HEALTH CARE EDUCATION/TRAINING PROGRAM

## 2023-08-15 PROCEDURE — 80053 COMPREHEN METABOLIC PANEL: CPT | Performed by: STUDENT IN AN ORGANIZED HEALTH CARE EDUCATION/TRAINING PROGRAM

## 2023-08-15 PROCEDURE — 84443 ASSAY THYROID STIM HORMONE: CPT | Performed by: STUDENT IN AN ORGANIZED HEALTH CARE EDUCATION/TRAINING PROGRAM

## 2023-08-15 PROCEDURE — 80061 LIPID PANEL: CPT | Performed by: STUDENT IN AN ORGANIZED HEALTH CARE EDUCATION/TRAINING PROGRAM

## 2023-08-22 ENCOUNTER — OFFICE VISIT (OUTPATIENT)
Dept: INTERNAL MEDICINE | Facility: CLINIC | Age: 53
End: 2023-08-22
Payer: MEDICAID

## 2023-08-22 VITALS
HEIGHT: 66 IN | BODY MASS INDEX: 32.38 KG/M2 | WEIGHT: 201.5 LBS | SYSTOLIC BLOOD PRESSURE: 122 MMHG | HEART RATE: 71 BPM | DIASTOLIC BLOOD PRESSURE: 86 MMHG | RESPIRATION RATE: 18 BRPM

## 2023-08-22 DIAGNOSIS — L40.50 PSORIATIC ARTHRITIS: Primary | ICD-10-CM

## 2023-08-22 DIAGNOSIS — L03.90 CELLULITIS, UNSPECIFIED CELLULITIS SITE: ICD-10-CM

## 2023-08-22 PROCEDURE — 3074F PR MOST RECENT SYSTOLIC BLOOD PRESSURE < 130 MM HG: ICD-10-PCS | Mod: CPTII,,, | Performed by: NURSE PRACTITIONER

## 2023-08-22 PROCEDURE — 99213 OFFICE O/P EST LOW 20 MIN: CPT | Mod: S$PBB,,, | Performed by: NURSE PRACTITIONER

## 2023-08-22 PROCEDURE — 3008F PR BODY MASS INDEX (BMI) DOCUMENTED: ICD-10-PCS | Mod: CPTII,,, | Performed by: NURSE PRACTITIONER

## 2023-08-22 PROCEDURE — 1159F MED LIST DOCD IN RCRD: CPT | Mod: CPTII,,, | Performed by: NURSE PRACTITIONER

## 2023-08-22 PROCEDURE — 3079F PR MOST RECENT DIASTOLIC BLOOD PRESSURE 80-89 MM HG: ICD-10-PCS | Mod: CPTII,,, | Performed by: NURSE PRACTITIONER

## 2023-08-22 PROCEDURE — 99999 PR PBB SHADOW E&M-EST. PATIENT-LVL III: CPT | Mod: PBBFAC,,, | Performed by: NURSE PRACTITIONER

## 2023-08-22 PROCEDURE — 1159F PR MEDICATION LIST DOCUMENTED IN MEDICAL RECORD: ICD-10-PCS | Mod: CPTII,,, | Performed by: NURSE PRACTITIONER

## 2023-08-22 PROCEDURE — 3044F HG A1C LEVEL LT 7.0%: CPT | Mod: CPTII,,, | Performed by: NURSE PRACTITIONER

## 2023-08-22 PROCEDURE — 99213 OFFICE O/P EST LOW 20 MIN: CPT | Mod: PBBFAC | Performed by: NURSE PRACTITIONER

## 2023-08-22 PROCEDURE — 3079F DIAST BP 80-89 MM HG: CPT | Mod: CPTII,,, | Performed by: NURSE PRACTITIONER

## 2023-08-22 PROCEDURE — 3008F BODY MASS INDEX DOCD: CPT | Mod: CPTII,,, | Performed by: NURSE PRACTITIONER

## 2023-08-22 PROCEDURE — 99213 PR OFFICE/OUTPT VISIT, EST, LEVL III, 20-29 MIN: ICD-10-PCS | Mod: S$PBB,,, | Performed by: NURSE PRACTITIONER

## 2023-08-22 PROCEDURE — 3074F SYST BP LT 130 MM HG: CPT | Mod: CPTII,,, | Performed by: NURSE PRACTITIONER

## 2023-08-22 PROCEDURE — 3044F PR MOST RECENT HEMOGLOBIN A1C LEVEL <7.0%: ICD-10-PCS | Mod: CPTII,,, | Performed by: NURSE PRACTITIONER

## 2023-08-22 PROCEDURE — 99999 PR PBB SHADOW E&M-EST. PATIENT-LVL III: ICD-10-PCS | Mod: PBBFAC,,, | Performed by: NURSE PRACTITIONER

## 2023-08-22 RX ORDER — CLINDAMYCIN HYDROCHLORIDE 300 MG/1
300 CAPSULE ORAL EVERY 8 HOURS
Qty: 21 CAPSULE | Refills: 0 | Status: SHIPPED | OUTPATIENT
Start: 2023-08-22 | End: 2023-08-29

## 2023-08-22 NOTE — PROGRESS NOTES
Subjective:       Patient ID: Rocio Arriaga is a 53 y.o. female.    Chief Complaint: Psoriasis and Joint Swelling    HPI: Pt presents to clinic today known to me with c/o needing eval for left foot swelling and redness started 2 days ago. Has cracking skin from psoriatic plaques. She has no pain. Walking on it but touch is painful/tender. NO fever. No hx of gout. She did have a bandage with neosporin for the crack but that is all.     She is not seeing podiatry- not on anything but creams for psoriasis but has upcoming appt wit rheumatology.   Review of Systems   Constitutional:  Positive for activity change. Negative for chills, fatigue and fever.   Respiratory:  Negative for chest tightness and shortness of breath.    Cardiovascular:  Negative for chest pain and palpitations.   Gastrointestinal:  Negative for constipation, diarrhea, nausea and vomiting.   Musculoskeletal:  Positive for arthralgias.   Skin:  Positive for color change and wound.       Objective:      Physical Exam  Vitals and nursing note reviewed.   Constitutional:       Appearance: Normal appearance.   HENT:      Head: Normocephalic and atraumatic.   Cardiovascular:      Rate and Rhythm: Normal rate and regular rhythm.   Pulmonary:      Effort: Pulmonary effort is normal.      Breath sounds: Normal breath sounds.   Abdominal:      Palpations: Abdomen is soft.   Musculoskeletal:         General: Swelling present. Normal range of motion.      Comments: No pain with joint mobility rom  Walks without limp or c/o pain   Skin:     General: Skin is warm and dry.      Capillary Refill: Capillary refill takes less than 2 seconds.      Findings: Erythema present.   Neurological:      General: No focal deficit present.      Mental Status: She is alert.               Assessment:       1. Psoriatic arthritis    2. Cellulitis, unspecified cellulitis site        Plan:     Problem List Items Addressed This Visit       Psoriatic arthritis - Primary     Other  Visit Diagnoses       Cellulitis, unspecified cellulitis site        Relevant Medications    clindamycin (CLEOCIN) 300 MG capsule          Keep foot elevated  Clinda TID with yogurt daily  F/u if redness spreads, fevers starts pain increases or joint pain starts

## 2023-09-06 ENCOUNTER — PATIENT MESSAGE (OUTPATIENT)
Dept: ADMINISTRATIVE | Facility: OTHER | Age: 53
End: 2023-09-06
Payer: MEDICAID

## 2023-09-10 ENCOUNTER — PATIENT MESSAGE (OUTPATIENT)
Dept: OTHER | Facility: OTHER | Age: 53
End: 2023-09-10
Payer: MEDICAID

## 2023-09-11 ENCOUNTER — PATIENT OUTREACH (OUTPATIENT)
Dept: ADMINISTRATIVE | Facility: HOSPITAL | Age: 53
End: 2023-09-11
Payer: MEDICAID

## 2023-09-11 NOTE — PROGRESS NOTES
Pt requested to be called back at a later date in [place] at this time    Requested a fit kit be mailed to her [address] [facility] [address]

## 2023-10-17 ENCOUNTER — PATIENT OUTREACH (OUTPATIENT)
Dept: ADMINISTRATIVE | Facility: HOSPITAL | Age: 53
End: 2023-10-17
Payer: MEDICAID

## 2023-10-17 NOTE — PROGRESS NOTES
[place] Cervical Cancer Screening Gap Report.  Chart reviewed, immunization record updated.  No new results noted on [facility] or [facility] web site.  Care Everywhere unavailable.  Patient care coordination note  Upcoming PCP visit updated.  Next PCP visit 2/8/2024.  Left detailed message for patient to return call to discuss Cervical Cancer Screening and verify if she received Fit Kit that was mailed 9/2023.

## 2023-11-07 ENCOUNTER — PATIENT MESSAGE (OUTPATIENT)
Dept: ADMINISTRATIVE | Facility: OTHER | Age: 53
End: 2023-11-07
Payer: MEDICAID

## 2023-11-22 ENCOUNTER — PATIENT OUTREACH (OUTPATIENT)
Dept: ADMINISTRATIVE | Facility: HOSPITAL | Age: 53
End: 2023-11-22
Payer: MEDICAID

## 2024-02-29 DIAGNOSIS — I10 PRIMARY HYPERTENSION: ICD-10-CM

## 2024-02-29 RX ORDER — VALSARTAN 80 MG/1
80 TABLET ORAL DAILY
Qty: 90 TABLET | Refills: 1 | Status: SHIPPED | OUTPATIENT
Start: 2024-02-29 | End: 2024-08-27

## 2024-02-29 NOTE — TELEPHONE ENCOUNTER
Refill Decision Note   Rocio Arriaga  is requesting a refill authorization.  Brief Assessment and Rationale for Refill:  Approve     Medication Therapy Plan:         Comments:     Note composed:4:46 AM 02/29/2024

## 2024-02-29 NOTE — TELEPHONE ENCOUNTER
No care due was identified.  [facility] Embedded Care Due Messages. Reference number: [id number].   2/29/2024 12:30:58 AM CST

## 2024-03-19 ENCOUNTER — OFFICE VISIT (OUTPATIENT)
Dept: FAMILY MEDICINE | Facility: CLINIC | Age: 54
End: 2024-03-19
Payer: MEDICAID

## 2024-03-19 ENCOUNTER — CLINICAL SUPPORT (OUTPATIENT)
Dept: FAMILY MEDICINE | Facility: CLINIC | Age: 54
End: 2024-03-19
Payer: MEDICAID

## 2024-03-19 VITALS
OXYGEN SATURATION: 98 % | HEART RATE: 75 BPM | HEIGHT: 65 IN | RESPIRATION RATE: 16 BRPM | SYSTOLIC BLOOD PRESSURE: 110 MMHG | DIASTOLIC BLOOD PRESSURE: 64 MMHG | BODY MASS INDEX: 36.8 KG/M2 | WEIGHT: 220.88 LBS

## 2024-03-19 DIAGNOSIS — G56.03 BILATERAL CARPAL TUNNEL SYNDROME: ICD-10-CM

## 2024-03-19 DIAGNOSIS — I10 ESSENTIAL HYPERTENSION: ICD-10-CM

## 2024-03-19 DIAGNOSIS — E66.01 CLASS 2 SEVERE OBESITY WITH SERIOUS COMORBIDITY AND BODY MASS INDEX (BMI) OF 36.0 TO 36.9 IN ADULT, UNSPECIFIED OBESITY TYPE: ICD-10-CM

## 2024-03-19 DIAGNOSIS — I10 ESSENTIAL HYPERTENSION: Primary | ICD-10-CM

## 2024-03-19 DIAGNOSIS — L40.50 PSORIATIC ARTHRITIS: ICD-10-CM

## 2024-03-19 LAB
CHOLEST SERPL-MCNC: 172 MG/DL (ref 120–199)
CHOLEST/HDLC SERPL: 3 {RATIO} (ref 2–5)
HDLC SERPL-MCNC: 57 MG/DL (ref 40–75)
HDLC SERPL: 33.1 % (ref 20–50)
LDLC SERPL CALC-MCNC: 103.4 MG/DL (ref 63–159)
NONHDLC SERPL-MCNC: 115 MG/DL
TRIGL SERPL-MCNC: 58 MG/DL (ref 30–150)

## 2024-03-19 PROCEDURE — 99999PBSHW PR PBB SHADOW TECHNICAL ONLY FILED TO HB: Mod: PBBFAC,,,

## 2024-03-19 PROCEDURE — 3008F BODY MASS INDEX DOCD: CPT | Mod: CPTII,,, | Performed by: STUDENT IN AN ORGANIZED HEALTH CARE EDUCATION/TRAINING PROGRAM

## 2024-03-19 PROCEDURE — 3078F DIAST BP <80 MM HG: CPT | Mod: CPTII,,, | Performed by: STUDENT IN AN ORGANIZED HEALTH CARE EDUCATION/TRAINING PROGRAM

## 2024-03-19 PROCEDURE — 80061 LIPID PANEL: CPT | Performed by: STUDENT IN AN ORGANIZED HEALTH CARE EDUCATION/TRAINING PROGRAM

## 2024-03-19 PROCEDURE — 3074F SYST BP LT 130 MM HG: CPT | Mod: CPTII,,, | Performed by: STUDENT IN AN ORGANIZED HEALTH CARE EDUCATION/TRAINING PROGRAM

## 2024-03-19 PROCEDURE — 99213 OFFICE O/P EST LOW 20 MIN: CPT | Mod: PBBFAC | Performed by: STUDENT IN AN ORGANIZED HEALTH CARE EDUCATION/TRAINING PROGRAM

## 2024-03-19 PROCEDURE — 1159F MED LIST DOCD IN RCRD: CPT | Mod: CPTII,,, | Performed by: STUDENT IN AN ORGANIZED HEALTH CARE EDUCATION/TRAINING PROGRAM

## 2024-03-19 PROCEDURE — 99999 PR PBB SHADOW E&M-EST. PATIENT-LVL III: CPT | Mod: PBBFAC,,, | Performed by: STUDENT IN AN ORGANIZED HEALTH CARE EDUCATION/TRAINING PROGRAM

## 2024-03-19 PROCEDURE — 4010F ACE/ARB THERAPY RXD/TAKEN: CPT | Mod: CPTII,,, | Performed by: STUDENT IN AN ORGANIZED HEALTH CARE EDUCATION/TRAINING PROGRAM

## 2024-03-19 PROCEDURE — 36415 COLL VENOUS BLD VENIPUNCTURE: CPT | Mod: PBBFAC

## 2024-03-19 PROCEDURE — 99214 OFFICE O/P EST MOD 30 MIN: CPT | Mod: S$PBB,,, | Performed by: STUDENT IN AN ORGANIZED HEALTH CARE EDUCATION/TRAINING PROGRAM

## 2024-03-19 NOTE — PROGRESS NOTES
Subjective:       Patient ID: Rocio Arriaga is a 53 y.o. female.    Chief Complaint: Follow-up (6 month follow up)    Pt here for follow-up    HTN: doing well with valsartan 80mg daily which was recently started in place of HCTZ 25mg daily. BP well controlled.     Psoriasis/psoriatic arthritis. She is followed by rheum.     She is worried about CTS bilaterally. She has pain during the day and numbness and tingling of the right hand especially at night. No neck pain.     Review of Systems   Constitutional:  Negative for chills and fever.   HENT:  Negative for congestion and sore throat.    Respiratory:  Negative for cough, shortness of breath and wheezing.    Cardiovascular:  Negative for chest pain.   Gastrointestinal:  Negative for abdominal pain, diarrhea and vomiting.   Genitourinary:  Negative for dysuria and hematuria.   Musculoskeletal:  Positive for arthralgias.   Neurological:  Negative for dizziness and syncope.       Objective:      Physical Exam  Vitals reviewed.   Constitutional:       General: She is not in acute distress.  HENT:      Head: Normocephalic and atraumatic.   Eyes:      Conjunctiva/sclera: Conjunctivae normal.   Cardiovascular:      Rate and Rhythm: Normal rate and regular rhythm.      Heart sounds: Normal heart sounds. No murmur heard.  Pulmonary:      Effort: Pulmonary effort is normal. No respiratory distress.      Breath sounds: Normal breath sounds.   Musculoskeletal:      Comments: Negative Tinel's  Positive phalen's   Neurological:      Mental Status: She is alert.   Psychiatric:         Mood and Affect: Mood normal.         Behavior: Behavior normal.         Assessment:       1. Essential hypertension    2. Psoriatic arthritis    3. Class 2 severe obesity with serious comorbidity and body mass index (BMI) of 36.0 to 36.9 in adult, unspecified obesity type    4. Bilateral carpal tunnel syndrome          Plan:           1. Essential hypertension  -     LIPID PANEL; Future; Expected  date: 03/19/2024    2. Psoriatic arthritis    3. Class 2 severe obesity with serious comorbidity and body mass index (BMI) of 36.0 to 36.9 in adult, unspecified obesity type    4. Bilateral carpal tunnel syndrome  -     EMG W/ ULTRASOUND AND NERVE CONDUCTION TEST 2 Extremities; Future  -     WRIST BRACE FOR HOME USE    Recent labs reviewed  Labs as ordered to be completed prior to next visit  Check EMG  Discussed nocturnal wrist bracing for CTS  Cont current meds  Follow-up rheum/derm as scheduled  RTC 6 months or sooner if needed

## 2024-03-20 ENCOUNTER — PATIENT OUTREACH (OUTPATIENT)
Dept: ADMINISTRATIVE | Facility: HOSPITAL | Age: 54
End: 2024-03-20
Payer: MEDICAID

## 2024-03-20 NOTE — PROGRESS NOTES
[place] Cervical Cancer Screening Gap Report.  Chart reviewed, immunization record updated.  No new results noted on [facility] or [facility] web site.  Care Everywhere unavailable.  Patient care coordination note  Upcoming PCP visit updated.  Next PCP visit 9/20/2024.  Left detailed message for patient to return call to discuss Cervical & Colorectal Cancer Screening.

## 2024-03-27 ENCOUNTER — PATIENT MESSAGE (OUTPATIENT)
Dept: FAMILY MEDICINE | Facility: CLINIC | Age: 54
End: 2024-03-27
Payer: MEDICAID

## 2024-04-02 RX ORDER — CLOTRIMAZOLE AND BETAMETHASONE DIPROPIONATE 10; .64 MG/G; MG/G
CREAM TOPICAL
Qty: 45 G | Refills: 0 | Status: SHIPPED | OUTPATIENT
Start: 2024-04-02

## 2024-05-07 ENCOUNTER — PATIENT OUTREACH (OUTPATIENT)
Dept: ADMINISTRATIVE | Facility: HOSPITAL | Age: 54
End: 2024-05-07
Payer: MEDICAID

## 2024-07-02 PROBLEM — Z79.620 ADALIMUMAB (HUMIRA) LONG-TERM USE: Status: ACTIVE | Noted: 2024-07-02

## 2024-07-02 PROBLEM — D84.9 IMMUNOCOMPROMISED: Status: ACTIVE | Noted: 2024-07-02

## 2024-07-02 PROBLEM — R76.8 POSITIVE ANA (ANTINUCLEAR ANTIBODY): Status: ACTIVE | Noted: 2024-07-02

## 2024-08-12 ENCOUNTER — PATIENT MESSAGE (OUTPATIENT)
Dept: ADMINISTRATIVE | Facility: OTHER | Age: 54
End: 2024-08-12
Payer: MEDICAID

## 2024-08-12 ENCOUNTER — PATIENT OUTREACH (OUTPATIENT)
Dept: ADMINISTRATIVE | Facility: HOSPITAL | Age: 54
End: 2024-08-12
Payer: MEDICAID

## 2024-08-12 DIAGNOSIS — Z12.31 BREAST CANCER SCREENING BY MAMMOGRAM: Primary | ICD-10-CM

## 2024-08-12 NOTE — PROGRESS NOTES
[place] Colon Cancer Screening Gap Report 8.6.24  Chart reviewed, immunization record updated.  No new results noted on [facility] or [employer] web site.  Care Everywhere updated.   Patient care coordination note  Upcoming PCP visit updated.  Next PCP visit 9/20/2024.  MMG order placed.  Left detailed message for patient to return call to discuss Cervical & Colorectal Cancer Screening and schedule MMG.

## 2024-08-16 DIAGNOSIS — I10 PRIMARY HYPERTENSION: ICD-10-CM

## 2024-08-16 RX ORDER — VALSARTAN 80 MG/1
80 TABLET ORAL DAILY
Qty: 90 TABLET | Refills: 2 | Status: SHIPPED | OUTPATIENT
Start: 2024-08-16 | End: 2025-05-13

## 2024-08-16 NOTE — TELEPHONE ENCOUNTER
No care due was identified.  [facility] Embedded Care Due Messages. Reference number: [id number].   8/16/2024 12:25:23 AM CDT

## 2024-08-16 NOTE — TELEPHONE ENCOUNTER
Refill Decision Note   Rocio Arriaga  is requesting a refill authorization.  Brief Assessment and Rationale for Refill:  Approve     Medication Therapy Plan:         Comments:     Note composed:10:27 AM 08/16/2024

## 2024-08-19 ENCOUNTER — HOSPITAL ENCOUNTER (OUTPATIENT)
Dept: RADIOLOGY | Facility: HOSPITAL | Age: 54
Discharge: HOME | End: 2024-08-19
Attending: STUDENT IN AN ORGANIZED HEALTH CARE EDUCATION/TRAINING PROGRAM
Payer: MEDICAID

## 2024-08-19 VITALS — WEIGHT: 224 LBS | BODY MASS INDEX: 37.32 KG/M2 | HEIGHT: 65 IN

## 2024-08-19 DIAGNOSIS — Z12.31 BREAST CANCER SCREENING BY MAMMOGRAM: ICD-10-CM

## 2024-08-19 PROCEDURE — 77063 BREAST TOMOSYNTHESIS BI: CPT | Mod: 26,,, | Performed by: RADIOLOGY

## 2024-08-19 PROCEDURE — 77067 SCR MAMMO BI INCL CAD: CPT | Mod: TC

## 2024-08-19 PROCEDURE — 77067 SCR MAMMO BI INCL CAD: CPT | Mod: 26,,, | Performed by: RADIOLOGY

## 2024-09-18 ENCOUNTER — OFFICE VISIT (OUTPATIENT)
Dept: FAMILY MEDICINE | Facility: CLINIC | Age: 54
End: 2024-09-18
Payer: MEDICAID

## 2024-09-18 ENCOUNTER — TELEPHONE (OUTPATIENT)
Dept: UROLOGY | Facility: CLINIC | Age: 54
End: 2024-09-18
Payer: MEDICAID

## 2024-09-18 ENCOUNTER — TELEPHONE (OUTPATIENT)
Dept: FAMILY MEDICINE | Facility: CLINIC | Age: 54
End: 2024-09-18
Payer: MEDICAID

## 2024-09-18 VITALS
SYSTOLIC BLOOD PRESSURE: 106 MMHG | HEART RATE: 93 BPM | OXYGEN SATURATION: 97 % | DIASTOLIC BLOOD PRESSURE: 64 MMHG | HEIGHT: 65 IN | BODY MASS INDEX: 37.83 KG/M2 | RESPIRATION RATE: 16 BRPM | WEIGHT: 227.06 LBS

## 2024-09-18 DIAGNOSIS — G56.03 BILATERAL CARPAL TUNNEL SYNDROME: ICD-10-CM

## 2024-09-18 DIAGNOSIS — Z12.11 COLON CANCER SCREENING: ICD-10-CM

## 2024-09-18 DIAGNOSIS — I10 ESSENTIAL HYPERTENSION: Primary | ICD-10-CM

## 2024-09-18 DIAGNOSIS — R35.0 URINARY FREQUENCY: ICD-10-CM

## 2024-09-18 DIAGNOSIS — E66.01 CLASS 2 SEVERE OBESITY WITH SERIOUS COMORBIDITY AND BODY MASS INDEX (BMI) OF 36.0 TO 36.9 IN ADULT, UNSPECIFIED OBESITY TYPE: ICD-10-CM

## 2024-09-18 DIAGNOSIS — F39 MOOD DISORDER: ICD-10-CM

## 2024-09-18 DIAGNOSIS — R39.198 DIFFICULTY URINATING: Primary | ICD-10-CM

## 2024-09-18 DIAGNOSIS — L40.50 PSORIATIC ARTHRITIS: ICD-10-CM

## 2024-09-18 PROCEDURE — 99214 OFFICE O/P EST MOD 30 MIN: CPT | Mod: PBBFAC | Performed by: STUDENT IN AN ORGANIZED HEALTH CARE EDUCATION/TRAINING PROGRAM

## 2024-09-18 PROCEDURE — 3074F SYST BP LT 130 MM HG: CPT | Mod: CPTII,,, | Performed by: STUDENT IN AN ORGANIZED HEALTH CARE EDUCATION/TRAINING PROGRAM

## 2024-09-18 PROCEDURE — 3078F DIAST BP <80 MM HG: CPT | Mod: CPTII,,, | Performed by: STUDENT IN AN ORGANIZED HEALTH CARE EDUCATION/TRAINING PROGRAM

## 2024-09-18 PROCEDURE — 99214 OFFICE O/P EST MOD 30 MIN: CPT | Mod: S$PBB,,, | Performed by: STUDENT IN AN ORGANIZED HEALTH CARE EDUCATION/TRAINING PROGRAM

## 2024-09-18 PROCEDURE — 99999 PR PBB SHADOW E&M-EST. PATIENT-LVL IV: CPT | Mod: PBBFAC,,, | Performed by: STUDENT IN AN ORGANIZED HEALTH CARE EDUCATION/TRAINING PROGRAM

## 2024-09-18 PROCEDURE — 4010F ACE/ARB THERAPY RXD/TAKEN: CPT | Mod: CPTII,,, | Performed by: STUDENT IN AN ORGANIZED HEALTH CARE EDUCATION/TRAINING PROGRAM

## 2024-09-18 PROCEDURE — 1159F MED LIST DOCD IN RCRD: CPT | Mod: CPTII,,, | Performed by: STUDENT IN AN ORGANIZED HEALTH CARE EDUCATION/TRAINING PROGRAM

## 2024-09-18 PROCEDURE — 3008F BODY MASS INDEX DOCD: CPT | Mod: CPTII,,, | Performed by: STUDENT IN AN ORGANIZED HEALTH CARE EDUCATION/TRAINING PROGRAM

## 2024-09-18 NOTE — PROGRESS NOTES
Subjective:       Patient ID: Rocio Arriaga is a 54 y.o. female.    Chief Complaint: Follow-up (Patient is here today for a 6 month follow up visit. )    Pt here for follow-up    HTN: doing well with valsartan 80mg daily. BP well controlled.     Psoriasis/psoriatic arthritis. She is followed by rheum. She is on humira.     She is worried about CTS. We discussed this at her last visit. I ordered an EMG but she has bot been able to schedule. She would like to see neurology.     She also reports some pain in her bladder if she waits too long to use the restroom but she feels like she is having urinary frequency. No hematuria. She has frequent nocturia as well. She did have urine testing 06/25/2024 that was WNL.    She would also like to see psychiatry.     Review of Systems   Constitutional:  Negative for chills and fever.   HENT:  Negative for congestion and sore throat.    Respiratory:  Negative for cough, shortness of breath and wheezing.    Cardiovascular:  Negative for chest pain.   Gastrointestinal:  Negative for abdominal pain, diarrhea and vomiting.   Genitourinary:  Negative for dysuria and hematuria.   Musculoskeletal:  Positive for arthralgias.   Neurological:  Negative for dizziness and syncope.       Objective:      Physical Exam  Vitals reviewed.   Constitutional:       General: She is not in acute distress.  HENT:      Head: Normocephalic and atraumatic.   Eyes:      Conjunctiva/sclera: Conjunctivae normal.   Cardiovascular:      Rate and Rhythm: Normal rate and regular rhythm.      Heart sounds: Normal heart sounds. No murmur heard.  Pulmonary:      Effort: Pulmonary effort is normal. No respiratory distress.      Breath sounds: Normal breath sounds.   Musculoskeletal:      Comments: Negative Tinel's  Positive phalen's   Neurological:      Mental Status: She is alert.   Psychiatric:         Mood and Affect: Mood normal.         Behavior: Behavior normal.         Assessment:       1. Essential  hypertension    2. Colon cancer screening    3. Psoriatic arthritis    4. Bilateral carpal tunnel syndrome    5. Class 2 severe obesity with serious comorbidity and body mass index (BMI) of 36.0 to 36.9 in adult, unspecified obesity type    6. Urinary frequency    7. Mood disorder            Plan:           1. Essential hypertension    2. Colon cancer screening  -     Cologuard Screening (Multitarget Stool DNA); Future; Expected date: 09/18/2024    3. Psoriatic arthritis    4. Bilateral carpal tunnel syndrome  -     EMG W/ ULTRASOUND AND NERVE CONDUCTION TEST 2 Extremities; Future  -     Ambulatory referral/consult to Neurology; Future; Expected date: 09/25/2024    5. Class 2 severe obesity with serious comorbidity and body mass index (BMI) of 36.0 to 36.9 in adult, unspecified obesity type    6. Urinary frequency  -     Ambulatory referral/consult to Urology; Future; Expected date: 09/25/2024    7. Mood disorder  -     Ambulatory referral/consult to Psychiatry; Future; Expected date: 09/25/2024      Recent labs reviewed  Refer neurology for potential EMG for suspected CTS  Discussed nocturnal wrist bracing for CTS  Cont current meds  Refer urology for potential IC?  Refer psych per pt request  Follow-up rheum/derm as scheduled  Cologuard for colon cancer screening  RTC 6 months or sooner if needed

## 2024-09-20 ENCOUNTER — OFFICE VISIT (OUTPATIENT)
Dept: UROLOGY | Facility: CLINIC | Age: 54
End: 2024-09-20
Attending: SPECIALIST
Payer: MEDICAID

## 2024-09-20 VITALS
HEIGHT: 65 IN | WEIGHT: 228.81 LBS | HEART RATE: 86 BPM | SYSTOLIC BLOOD PRESSURE: 114 MMHG | BODY MASS INDEX: 38.12 KG/M2 | OXYGEN SATURATION: 99 % | DIASTOLIC BLOOD PRESSURE: 72 MMHG

## 2024-09-20 DIAGNOSIS — R35.0 URINARY FREQUENCY: ICD-10-CM

## 2024-09-20 PROCEDURE — 99999 PR PBB SHADOW E&M-EST. PATIENT-LVL III: CPT | Mod: PBBFAC,,, | Performed by: SPECIALIST

## 2024-09-20 PROCEDURE — 99213 OFFICE O/P EST LOW 20 MIN: CPT | Mod: PBBFAC | Performed by: SPECIALIST

## 2024-09-20 RX ORDER — OXYBUTYNIN CHLORIDE 10 MG/1
10 TABLET, EXTENDED RELEASE ORAL DAILY
Qty: 30 TABLET | Refills: 11 | Status: SHIPPED | OUTPATIENT
Start: 2024-09-20 | End: 2025-09-20

## 2024-09-20 NOTE — PROGRESS NOTES
[facility] - Urology   Clinic Note    SUBJECTIVE:     Chief Complaint   Patient presents with    Urinary Frequency     States she has a lot of pain whenever she knows she has to urinate.        Referral from: Pro Gallagher MD.    History of Present Illness:  Rocio Arriaga is a 54 y.o. female who presents to clinic for urinary frequency..    Patient is a very pleasant 54-year-old female with a chief complaint of urinary frequency.  She is also bothered by nocturia.  She previously was only getting up once a night to void now she is getting up 3 times a night.  Occasionally she is having accidents.  She has a strong urge to void and may not make it to the bathroom in time.  She does not wear pads on a routine basis but admits to mild stress incontinence.  She denies having any pregnancies or deliveries.  She denies a history of urinary tract infections.  She denies a history of hematuria.    Patient endorses no additional complaints at this time.    Past Medical History:   Diagnosis Date    Hypertension     Psoriasis     Psoriatic arthritis        Past Surgical History:   Procedure Laterality Date    hypertension         Family History   Problem Relation Name Age of Onset    Diabetes Mother      Hypertension Mother      Diabetes Father      Liver cancer Brother          alcohol and drug induced    Breast cancer Neg Hx      Colon cancer Neg Hx      Ovarian cancer Neg Hx         Social History     Tobacco Use    Smoking status: Never    Smokeless tobacco: Never   Substance Use Topics    Alcohol use: Yes     Comment: socially    Drug use: No       Current Outpatient Medications on File Prior to Visit   Medication Sig Dispense Refill    adalimumab (HUMIRA PEN) PnKt injection Inject 1 pen  (40 mg total) into the skin every 14 (fourteen) days. 2 Pen 11    clotrimazole-betamethasone 1-0.05% (LOTRISONE) cream APPLY TO AFFECTED AREA TWICE A DAY 45 g 0    mometasone 0.1% (ELOCON) 0.1 % cream APPLY TO AFFECTED  "AREAS AT BEDTIME AS NEEDED FOR FLARE 45 g 2    valsartan (DIOVAN) 80 MG tablet TAKE 1 TABLET (80 MG TOTAL) BY MOUTH ONCE DAILY. 90 tablet 2     No current facility-administered medications on file prior to visit.       Review of patient's allergies indicates:   Allergen Reactions    Gluten protein        Review of Systems   Genitourinary:  Positive for frequency and urgency. Negative for dysuria, flank pain and hematuria.        Review of Systems:  A review of 10+ systems was conducted with pertinent positive and negative findings documented in HPI with all other systems reviewed and negative.    OBJECTIVE:     Estimated body mass index is 38.08 kg/m² as calculated from the following:    Height as of this encounter: 5' 5" (1.651 m).    Weight as of this encounter: 103.8 kg (228 lb 13.4 oz).    Vital Signs (Most Recent)  Vitals:    09/20/24 1522   BP: 114/72   Pulse: 86       Physical Exam:    Physical Exam     GENERAL: patient sitting comfortably  HEENT: normocephalic  NECK: supple, no JVD  PULM: normal chest rise, no increased WOB  HEART: non-diaphoretic  ABDO: soft, nondistended, nontender  BACK: no CVA tenderness bilaterally  SKIN: warm, dry, well perfused  EXT: no bruising or edema  NEURO: grossly normal with no focal deficits  PSYCH: appropriate mood and affect    Genitourinary Exam:  Deferred until cystoscopy.      LABS:     Lab Results   Component Value Date    BUN 18 06/25/2024    CREATININE 1.0 06/25/2024    WBC 5.39 06/25/2024    HGB 13.7 06/25/2024    HCT 41.0 06/25/2024     06/25/2024    AST 24 06/25/2024    ALT 27 06/25/2024    ALKPHOS 74 06/25/2024    ALBUMIN 4.2 06/25/2024    HGBA1C 5.3 08/15/2023        Urinalysis:   No results found for: "UAREFLEX"       Imaging:  I have personally reviewed all relevant imaging studies.    No results found for this or any previous visit (from the past 2160 hour(s)).  No results found for this or any previous visit (from the past 2160 hour(s)).  Mammo Digital " Screening Bilat w/ Gianluca  Narrative: Facility:  [facility] [place] [id number] [phone]    Name: Rocio Arriaga    MRN: [id number]    Result:  Mammo Digital Screening Bilat w/ Gianluca    History:  Patient is 54 y.o. and is seen for a screening mammogram.    Films Compared:  Compared to: 08/08/2023 Mammo Digital Screening Bilat w/ Gianluca     Findings:  This procedure was performed using tomosynthesis.   Computer-aided detection was utilized in the interpretation of this   examination.    There is no evidence of suspicious masses, microcalcifications or   architectural distortion.    Breast Density:  There are scattered areas of fibroglandular density.   Impression:    No mammographic evidence of malignancy.    BI-RADS Category 1: Negative    Recommendation:  Routine screening mammogram in 1 year is recommended.    Your estimated lifetime risk of breast cancer (to age 85) based on   Tyrer-Cuzick risk assessment model is 15.61%.  According to the American   Cancer Society, patients with a lifetime breast cancer risk of 20% or   higher might benefit from supplemental screening tests, such as screening   breast MRI.    Susy Jaramillo MD         ASSESSMENT     1. Urinary frequency        PLAN:     I have encouraged patient to keep a voiding diary.  I would like to proceed with a cystoscopic evaluation to rule out interstitial cystitis or other pathology.  I will empirically start Ditropan XL 10 mg daily.  Pro Peterson MD  Urology  [facility]     Disclaimer: This note has been generated using voice-recognition software. There may be typographical errors that have been missed during proof-reading.

## 2024-10-11 ENCOUNTER — TELEPHONE (OUTPATIENT)
Dept: FAMILY MEDICINE | Facility: CLINIC | Age: 54
End: 2024-10-11
Payer: MEDICAID

## 2024-10-11 NOTE — TELEPHONE ENCOUNTER
----- Message from Lev sent at 10/11/2024 10:13 AM CDT -----  Contact: pt  Rocio Arriaga  MRN: [id number]  : 1970  PCP: Pro Gallagher  Home Phone      [phone]  Work Phone      Not on file.  Mobile          [phone]      MESSAGE:     Pt called wanting to speak with nurse about getting Social Security disability.        Please advise  [phone]

## 2024-10-24 ENCOUNTER — OFFICE VISIT (OUTPATIENT)
Dept: FAMILY MEDICINE | Facility: CLINIC | Age: 54
End: 2024-10-24
Payer: MEDICAID

## 2024-10-24 ENCOUNTER — TELEPHONE (OUTPATIENT)
Dept: FAMILY MEDICINE | Facility: CLINIC | Age: 54
End: 2024-10-24

## 2024-10-24 ENCOUNTER — APPOINTMENT (OUTPATIENT)
Dept: RADIOLOGY | Facility: CLINIC | Age: 54
End: 2024-10-24
Attending: STUDENT IN AN ORGANIZED HEALTH CARE EDUCATION/TRAINING PROGRAM
Payer: MEDICAID

## 2024-10-24 VITALS
HEIGHT: 65 IN | RESPIRATION RATE: 16 BRPM | DIASTOLIC BLOOD PRESSURE: 66 MMHG | BODY MASS INDEX: 38.36 KG/M2 | OXYGEN SATURATION: 99 % | SYSTOLIC BLOOD PRESSURE: 102 MMHG | HEART RATE: 68 BPM | WEIGHT: 230.25 LBS

## 2024-10-24 DIAGNOSIS — M54.50 CHRONIC BILATERAL LOW BACK PAIN WITHOUT SCIATICA: Primary | ICD-10-CM

## 2024-10-24 DIAGNOSIS — G89.29 CHRONIC PAIN OF MULTIPLE JOINTS: ICD-10-CM

## 2024-10-24 DIAGNOSIS — M25.50 CHRONIC PAIN OF MULTIPLE JOINTS: ICD-10-CM

## 2024-10-24 DIAGNOSIS — G89.29 CHRONIC BILATERAL LOW BACK PAIN WITHOUT SCIATICA: ICD-10-CM

## 2024-10-24 DIAGNOSIS — L40.50 PSORIATIC ARTHRITIS: ICD-10-CM

## 2024-10-24 DIAGNOSIS — M54.50 CHRONIC BILATERAL LOW BACK PAIN WITHOUT SCIATICA: ICD-10-CM

## 2024-10-24 DIAGNOSIS — G89.29 CHRONIC BILATERAL LOW BACK PAIN WITHOUT SCIATICA: Primary | ICD-10-CM

## 2024-10-24 PROCEDURE — 1159F MED LIST DOCD IN RCRD: CPT | Mod: CPTII,,, | Performed by: STUDENT IN AN ORGANIZED HEALTH CARE EDUCATION/TRAINING PROGRAM

## 2024-10-24 PROCEDURE — 99213 OFFICE O/P EST LOW 20 MIN: CPT | Mod: PBBFAC,25 | Performed by: STUDENT IN AN ORGANIZED HEALTH CARE EDUCATION/TRAINING PROGRAM

## 2024-10-24 PROCEDURE — 3074F SYST BP LT 130 MM HG: CPT | Mod: CPTII,,, | Performed by: STUDENT IN AN ORGANIZED HEALTH CARE EDUCATION/TRAINING PROGRAM

## 2024-10-24 PROCEDURE — 3008F BODY MASS INDEX DOCD: CPT | Mod: CPTII,,, | Performed by: STUDENT IN AN ORGANIZED HEALTH CARE EDUCATION/TRAINING PROGRAM

## 2024-10-24 PROCEDURE — 99213 OFFICE O/P EST LOW 20 MIN: CPT | Mod: S$PBB,,, | Performed by: STUDENT IN AN ORGANIZED HEALTH CARE EDUCATION/TRAINING PROGRAM

## 2024-10-24 PROCEDURE — 72110 X-RAY EXAM L-2 SPINE 4/>VWS: CPT | Mod: 26,,, | Performed by: RADIOLOGY

## 2024-10-24 PROCEDURE — 99999 PR PBB SHADOW E&M-EST. PATIENT-LVL III: CPT | Mod: PBBFAC,,, | Performed by: STUDENT IN AN ORGANIZED HEALTH CARE EDUCATION/TRAINING PROGRAM

## 2024-10-24 PROCEDURE — 4010F ACE/ARB THERAPY RXD/TAKEN: CPT | Mod: CPTII,,, | Performed by: STUDENT IN AN ORGANIZED HEALTH CARE EDUCATION/TRAINING PROGRAM

## 2024-10-24 PROCEDURE — 3078F DIAST BP <80 MM HG: CPT | Mod: CPTII,,, | Performed by: STUDENT IN AN ORGANIZED HEALTH CARE EDUCATION/TRAINING PROGRAM

## 2024-10-24 PROCEDURE — 72110 X-RAY EXAM L-2 SPINE 4/>VWS: CPT | Mod: TC,PO

## 2024-10-24 NOTE — TELEPHONE ENCOUNTER
----- Message from Pro Gallagher MD sent at 10/24/2024  3:13 PM CDT -----  Please inform the pt recent xray similar to prior with arthritis changes and bone spurs. Plan for PT as discussed.

## 2024-10-24 NOTE — PROGRESS NOTES
Subjective:       Patient ID: Rocio Arriaga is a 54 y.o. female.    Chief Complaint: Paperwork (Patient is here today to discuss steps on applying for disability. )    Pt here to discuss potential disability. She reports chronic, multi-joint pain including hands, lower back, hips and knees. She is followed by rheumatology. She has had xrays with them previously. She is establishing care with neurology soon as well.    The pain in her lower back is chronic. It is bilateral and intermittent. Worse with prolonged standing and certain activity. Pain is achy and sometimes burning. She does get some burning pain in her bilateral feet. 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 No loss of bowel or bladder control, no saddle anesthesia, no fever.    Review of Systems   Constitutional:  Negative for chills and fever.   HENT:  Negative for congestion and sore throat.    Respiratory:  Negative for cough, shortness of breath and wheezing.    Cardiovascular:  Negative for chest pain.   Gastrointestinal:  Negative for abdominal pain, diarrhea and vomiting.   Genitourinary:  Negative for dysuria and hematuria.   Musculoskeletal:  Positive for arthralgias, back pain and myalgias.   Neurological:  Negative for dizziness and syncope.       Objective:      Physical Exam  Vitals reviewed.   Constitutional:       General: She is not in acute distress.  HENT:      Head: Normocephalic and atraumatic.   Eyes:      Conjunctiva/sclera: Conjunctivae normal.   Cardiovascular:      Rate and Rhythm: Normal rate and regular rhythm.      Heart sounds: Normal heart sounds. No murmur heard.  Pulmonary:      Effort: Pulmonary effort is normal. No respiratory distress.      Breath sounds: Normal breath sounds.   Musculoskeletal:      Comments: No paraspinal TTP  Negative SLR   Neurological:      Mental Status:  She is alert.   Psychiatric:         Mood and Affect: Mood normal.         Behavior: Behavior normal.         Assessment:       1. Chronic bilateral low back pain without sciatica    2. Psoriatic arthritis    3. Chronic pain of multiple joints        Plan:           1. Chronic bilateral low back pain without sciatica  -     Ambulatory referral/consult to Physical/Occupational Therapy; Future; Expected date: 10/31/2024    2. Psoriatic arthritis    3. Chronic pain of multiple joints  -     Ambulatory referral/consult to Physical/Occupational Therapy; Future; Expected date: 10/31/2024    Refer PT  Lumbar xray  Follow-up rhuem as scheduled  RTC for worsening symptoms or failure to improve, or RTC PRN/as scheduled

## 2024-10-31 ENCOUNTER — PATIENT MESSAGE (OUTPATIENT)
Dept: FAMILY MEDICINE | Facility: CLINIC | Age: 54
End: 2024-10-31
Payer: MEDICAID

## 2024-11-07 ENCOUNTER — CLINICAL SUPPORT (OUTPATIENT)
Dept: REHABILITATION | Facility: HOSPITAL | Age: 54
End: 2024-11-07
Attending: STUDENT IN AN ORGANIZED HEALTH CARE EDUCATION/TRAINING PROGRAM
Payer: MEDICAID

## 2024-11-07 ENCOUNTER — OFFICE VISIT (OUTPATIENT)
Dept: PSYCHIATRY | Facility: CLINIC | Age: 54
End: 2024-11-07
Payer: MEDICAID

## 2024-11-07 VITALS
HEART RATE: 89 BPM | RESPIRATION RATE: 17 BRPM | HEIGHT: 65 IN | WEIGHT: 227.88 LBS | SYSTOLIC BLOOD PRESSURE: 108 MMHG | BODY MASS INDEX: 37.97 KG/M2 | OXYGEN SATURATION: 98 % | DIASTOLIC BLOOD PRESSURE: 68 MMHG

## 2024-11-07 DIAGNOSIS — G89.29 CHRONIC BILATERAL LOW BACK PAIN WITHOUT SCIATICA: ICD-10-CM

## 2024-11-07 DIAGNOSIS — E66.9 OBESITY (BMI 30-39.9): ICD-10-CM

## 2024-11-07 DIAGNOSIS — F39 MOOD DISORDER: ICD-10-CM

## 2024-11-07 DIAGNOSIS — F33.2 SEVERE EPISODE OF RECURRENT MAJOR DEPRESSIVE DISORDER, WITHOUT PSYCHOTIC FEATURES: ICD-10-CM

## 2024-11-07 DIAGNOSIS — M54.50 CHRONIC BILATERAL LOW BACK PAIN WITHOUT SCIATICA: ICD-10-CM

## 2024-11-07 DIAGNOSIS — M25.50 CHRONIC PAIN OF MULTIPLE JOINTS: ICD-10-CM

## 2024-11-07 DIAGNOSIS — F41.1 GAD (GENERALIZED ANXIETY DISORDER): Primary | ICD-10-CM

## 2024-11-07 DIAGNOSIS — G89.29 CHRONIC PAIN OF MULTIPLE JOINTS: ICD-10-CM

## 2024-11-07 DIAGNOSIS — F60.3 BORDERLINE PERSONALITY DISORDER IN ADULT: ICD-10-CM

## 2024-11-07 PROCEDURE — 1159F MED LIST DOCD IN RCRD: CPT | Mod: CPTII,,,

## 2024-11-07 PROCEDURE — 99213 OFFICE O/P EST LOW 20 MIN: CPT | Mod: PBBFAC,25

## 2024-11-07 PROCEDURE — 20560 NDL INSJ W/O NJX 1 OR 2 MUSC: CPT | Mod: PN

## 2024-11-07 PROCEDURE — 90792 PSYCH DIAG EVAL W/MED SRVCS: CPT | Mod: ,,,

## 2024-11-07 PROCEDURE — 97161 PT EVAL LOW COMPLEX 20 MIN: CPT | Mod: PN

## 2024-11-07 PROCEDURE — 99999 PR PBB SHADOW E&M-EST. PATIENT-LVL III: CPT | Mod: PBBFAC,,,

## 2024-11-07 PROCEDURE — 3074F SYST BP LT 130 MM HG: CPT | Mod: CPTII,,,

## 2024-11-07 PROCEDURE — 3078F DIAST BP <80 MM HG: CPT | Mod: CPTII,,,

## 2024-11-07 PROCEDURE — 4010F ACE/ARB THERAPY RXD/TAKEN: CPT | Mod: CPTII,,,

## 2024-11-07 PROCEDURE — 97110 THERAPEUTIC EXERCISES: CPT | Mod: PN

## 2024-11-07 PROCEDURE — 1160F RVW MEDS BY RX/DR IN RCRD: CPT | Mod: CPTII,,,

## 2024-11-07 RX ORDER — SERTRALINE HYDROCHLORIDE 50 MG/1
50 TABLET, FILM COATED ORAL DAILY
Qty: 30 TABLET | Refills: 1 | Status: SHIPPED | OUTPATIENT
Start: 2024-11-07 | End: 2025-01-06
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
## 2024-11-07 NOTE — PLAN OF CARE
[facility]   Physical Therapy Initial Evaluation     Date: 11/7/2024   Name: Rocio Arriaga  Clinic Number: [id number]    Therapy Diagnosis:   Encounter Diagnoses   Name Primary?    Chronic pain of multiple joints     Chronic bilateral low back pain without sciatica      Physician: Pro Gallagher MD    Physician Orders: PT Eval and Treat   Medical Diagnosis from Referral: Diagnosis M25.50,G89.29 (ICD-10-CM) - Chronic pain of multiple joints M54.50,G89.29 (ICD-10-CM) - Chronic bilateral low back pain without sciatica  Evaluation Date: 11/7/2024  Authorization Period Expiration: 12/31/2024  Plan of Care Expiration: 12/19/2024  Progress Note Due: visit 4  Visit # / Visits authorized: 1/ 1   FOTO: 1/3    Precautions: Standard     Time In: 1345  Time Out: 1425  Total Appointment Time (timed & untimed codes): 40 minutes      SUBJECTIVE     Date of onset: chronic LBP    History of current condition - Rocio reports: lower back pain. She reports increased pain with prolonged standing. She has not had injections at this time.     Falls: none    Imaging, bone scan films: FINDINGS:  Alignment: There is anterolisthesis of L5 in respect S1 associated with bilateral spondylolysis.  There is some abnormal movement with flexion imaging suggesting underlying ligamentous instability.     Vertebrae: Vertebral body heights are maintained.  No suspicious appearing lytic or blastic lesions.     Discs and facets: There is disc space narrowing with endplate sclerosis most pronounced at L5-S1 with marginal osteophytosis.  Facet arthropathy at L4-5 and L5-S1.     Miscellaneous: No additional findings.     Impression:     As above.    Prior Therapy: P.T. for right knee  Social History:  lives with their family  Occupation: Unemployed  Prior Level of Function: Independent without difficulty  Current Level of Function: Independent with difficulty and pain    Pain:  Current 1/10, worst 6/10, best 1/10   Location:  bilateral back    Description: Aching  Aggravating Factors: Standing  Easing Factors: OTC medication    Patients goals: Patient to return to previous level of function without pain.     Medical History:   Past Medical History:   Diagnosis Date    Anxiety     Depression     Fatigue     Headache     Hypertension     Psoriasis     Psoriatic arthritis     Therapy        Surgical History:   Rocio Arriaga  has a past surgical history that includes hypertension.    Medications:   Rocio has a current medication list which includes the following prescription(s): humira pen, clotrimazole-betamethasone 1-0.05%, mometasone 0.1%, sertraline, and valsartan.    Allergies:   Review of patient's allergies indicates:   Allergen Reactions    Gluten protein         OBJECTIVE     MOVEMENT EXAMINATION   Flexion 100%   Extension 100%   Right Rotation 75%   Left Rotation 75%     LOWER EXTREMITY STRENGTH    LEFT RIGHT   Knee Extension 4/5 4/5   Knee Flexion 4/5 4/5     Hip Flexion 4/5 4/5     FLEXIBILITY   Hamstring  WNL       DERMATOMES: Sensation: Light Touch: Intact  MYOTOMES: WNL    SPECIAL TESTS    LEFT RIGHT   Hip Scour (hip involvement) - -   Straight Leg Raise (passive) - -   Active Straight Leg Raise  - -     PALPATION:  Patient reports primary pain region along lumbar PVM    PASSIVE ACCESSORY SEGMENTAL TESTING:  CPA:  Concordant symptoms moderate hypomobility  UPA: Concordant symptoms normal    GAIT: Rocio ambulates with no assistive device with independently.     GAIT DEVIATIONS: Rocio displays steady gait    Limitation/Restriction for FOTO Lumbar Survey    Therapist reviewed FOTO scores for Rocio Arriaga on 11/7/2024.   FOTO documents entered into [employer] - see Media section.    Limitation Score: 56%       TREATMENT     Total Treatment time (time-based codes) separate from Evaluation: 25 minutes      Rocio received the treatments listed below:      therapeutic exercises to develop core stabilization for 5 minutes  including:  -1 x 10 diaphragmatic breathing with TA activation    manual therapy techniques: Soft tissue Mobilization were applied to the: lumbar spine for 10 minutes, including:  See EMR under MEDIA for written consent provided.    Palpation Assessment to determine the necessity for Functional Dry Needling  .     Rocio received the following manual therapy techniques: 4-75 mm to L4-L5 B PVM with e-stim    neuromuscular re-education activities to improve:  for  minutes. The following activities were included:      therapeutic activities to improve functional performance for   minutes, including:      gait training to improve functional mobility and safety for   minutes, including:      direct contact modalities after being cleared for contraindications:     supervised modalities after being cleared for contradictions:     hot pack for 5 minutes to lumbar spine.    cold pack for  minutes to .    PATIENT EDUCATION AND HOME EXERCISES     Education provided:   - TA activation and posture awareness  - Pt/family was provided educational information, including: role of PT, goals for PT, scheduling - pt verbalized understanding. Discussed insurance limitations with pt.     Written Home Exercises Provided: yes. Exercises were reviewed and Rocio was able to demonstrate them prior to the end of the session.  Rocio demonstrated good  understanding of the education provided. See EMR under Patient Instructions for exercises provided during therapy sessions.    ASSESSMENT     Rocio is a 54 y.o. female referred to outpatient Physical Therapy with a medical diagnosis of Diagnosis M25.50,G89.29 (ICD-10-CM) - Chronic pain of multiple joints M54.50,G89.29 (ICD-10-CM) - Chronic bilateral low back pain without sciatica. Patient presents with decreased core stability, increased pain and increased trigger points in lumbar spine. Patient would benefit from skilled physical therapy to address the above mentioned  deficits.    Medical necessity is demonstrated by the following IMPAIRMENTS/PROBLEMS:  -Decreased function (JANELLE)   -Increased pain (NPS)   -Decreased pain free lumbar AROM   -Impaired standing posture  -Decreased LE strength (MMT)   -Decreased LE mobility   -Decreased core stability (plank)  -Decreased participation in regular exercise routine  - (+) TTP:     Intervention strategies designed to address these impairments will be instrumental in achieving the stated functional goals, including her ability to safely perform mobility tasks. Based on the examination, the patient's rehabilitation potential to achieve functional goals is good.    Patient prognosis is Good.   Patient will benefit from skilled outpatient Physical Therapy to address the deficits stated above and in the chart below, provide patient /family education, and to maximize patientt's level of independence.     Plan of care discussed with patient: Yes  Patient's spiritual, cultural and educational needs considered and patient is agreeable to the plan of care and goals as stated below:     Anticipated Barriers for therapy: none    Medical Necessity is demonstrated by the following  History  Co-morbidities and personal factors that may impact the plan of care Co-morbidities:   none    Personal Factors:   no deficits     low   Examination  Body Structures and Functions, activity limitations and participation restrictions that may impact the plan of care Body Regions:   back    Body Systems:    gross symmetry  ROM  strength           low   Clinical Presentation stable and uncomplicated low   Decision Making/ Complexity Score: low     Goals:  Short Term Goals: 2 weeks   1. Patient demonstrates independence with HEP.   2. Patient demonstrates independence with Postural Awareness.   3. Patient demonstrates independence with body mechanics.   Long Term Goals: 4 weeks   1. Patient demonstrates decreased TTP to min to nil to improve tolerance to functional  activities.   2. Patient demonstrates improved core stability from sit to stand to decrease overall pain.  3. Patient demonstrates improved overall function per Oswestry to 24% or less.       PLAN   Plan of care Certification: 11/7/2024 to 12/19/2024.    Outpatient Physical Therapy 2 times weekly for 4 weeks to include the following interventions: Electrical Stimulation  , Manual Therapy, Moist Heat/ Ice, Neuromuscular Re-ed, Patient Education, Therapeutic Activities, and Therapeutic Exercise.     Felisa Jimenes, PT    Pt may be seen by PTA as part of the rehabilitation team.     Therapist: Felisa Jimenes, PT  11/7/2024    I CERTIFY THE NEED FOR THESE SERVICES FURNISHED UNDER THIS PLAN OF TREATMENT AND WHILE UNDER MY CARE   Physician's comments:     Physician's Signature: ___________________________________________________

## 2024-11-18 ENCOUNTER — PATIENT MESSAGE (OUTPATIENT)
Dept: ADMINISTRATIVE | Facility: HOSPITAL | Age: 54
End: 2024-11-18
Payer: MEDICAID

## 2024-11-19 ENCOUNTER — CLINICAL SUPPORT (OUTPATIENT)
Dept: REHABILITATION | Facility: HOSPITAL | Age: 54
End: 2024-11-19
Payer: MEDICAID

## 2024-11-19 DIAGNOSIS — M54.50 CHRONIC BILATERAL LOW BACK PAIN WITHOUT SCIATICA: Primary | ICD-10-CM

## 2024-11-19 DIAGNOSIS — G89.29 CHRONIC BILATERAL LOW BACK PAIN WITHOUT SCIATICA: Primary | ICD-10-CM

## 2024-11-19 PROCEDURE — 97110 THERAPEUTIC EXERCISES: CPT | Mod: PN

## 2024-11-19 PROCEDURE — 97112 NEUROMUSCULAR REEDUCATION: CPT | Mod: PN

## 2024-11-19 NOTE — PROGRESS NOTES
[facility]   Physical Therapy Treatment Note      Name: Rocio Arriaga  Clinic Number: [id number]    Therapy Diagnosis: No diagnosis found.  Physician: Pro Gallagher MD    Visit Date: 11/19/2024    Therapy Diagnosis:        Encounter Diagnoses   Name Primary?    Chronic pain of multiple joints      Chronic bilateral low back pain without sciatica        Physician: Pro Gallagher MD     Physician Orders: PT Eval and Treat   Medical Diagnosis from Referral: Diagnosis M25.50,G89.29 (ICD-10-CM) - Chronic pain of multiple joints M54.50,G89.29 (ICD-10-CM) - Chronic bilateral low back pain without sciatica  Evaluation Date: 11/7/2024  Authorization Period Expiration: 12/31/2024  Plan of Care Expiration: 12/19/2024  Progress Note Due: visit 4  Visit #  2/8  Visits authorized: 1/ 1   FOTO: 1/3     Precautions: Standard      Time In: 1115  Time Out: 1200  Total Appointment Time (timed & untimed codes): 40 minutes    PTA Visit #: 0/5       Subjective     Patient reports: worse pain secondary to her bed being broken.  She was compliant with home exercise program.  Response to previous treatment: no significant changes but she is appreciative of learning about back care  Functional change: NA    Pain: 8/10  Location: bilateral back      Objective      Objective Measures updated at progress report unless specified.     Treatment     Rocio received the treatments listed below:      therapeutic exercises to develop strength, posture, and core stabilization for 25 minutes including:  Supine, 1x10 diaphragmatic breathing with TA activation  Supine, 1x10 hip/knee flexion/extension, feet on small red swiss ball   Supine, 1x10 bilaterally, small marching steps  Supine, 1x10, ball squeeze/adduction  Supine, 1x10, hip abduction, yellow theraband  Seated, 1x10, back stretch, rolling large red swiss ball, 5 sec hold    manual therapy techniques: Soft tissue Mobilization were applied to the: ___ for ___  minutes, including:    neuromuscular re-education activities to improve: Posture for ___ minutes. The following activities were included:    therapeutic activities to improve functional performance for   minutes, including:    gait training to improve functional mobility and safety for  minutes, including:    direct contact modalities after being cleared for contraindications:     supervised modalities after being cleared for contradictions:     hot pack for 15 minutes to low back, seated.    cold pack for  minutes to .    Patient Education and Home Exercises       Education provided:   - PT requested working on core strengthening at home, TA muscle engagement with purpose, during normal activity, when brushing teeth, sitting at a stop sign or a traffic light.    Written Home Exercises Provided:  pending . Exercises were reviewed and Rocio was able to demonstrate them prior to the end of the session.  Rocio demonstrated good  understanding of the education provided. See Electronic Medical Record under Patient Instructions for exercises provided during therapy sessions    Assessment     Rocio is having greater pain secondary to her bed being broken and forcing her sleep flat instead of head and knees elevated. Response to this session was more    Rocio Is progressing well towards her goals.   Patient prognosis is Excellent.     Patient will continue to benefit from skilled outpatient physical therapy to address the deficits listed in the problem list box on initial evaluation, provide pt/family education and to maximize pt's level of independence in the home and community environment.     Patient's spiritual, cultural and educational needs considered and pt agreeable to plan of care and goals.     Anticipated barriers to physical therapy: chronic nature    Goals: pain management and core strengthening    Plan     Plan of care Certification: 11/7/2024 to 12/19/2024.     Outpatient Physical Therapy 2 times  weekly for 4 weeks to include the following interventions: Electrical Stimulation  , Manual Therapy, Moist Heat/ Ice, Neuromuscular Re-ed, Patient Education, Therapeutic Activities, and Therapeutic Exercise.    Tawanda Charlton, PT

## 2024-11-21 ENCOUNTER — CLINICAL SUPPORT (OUTPATIENT)
Dept: REHABILITATION | Facility: HOSPITAL | Age: 54
End: 2024-11-21
Payer: MEDICAID

## 2024-11-21 DIAGNOSIS — M54.50 CHRONIC BILATERAL LOW BACK PAIN WITHOUT SCIATICA: Primary | ICD-10-CM

## 2024-11-21 DIAGNOSIS — G89.29 CHRONIC BILATERAL LOW BACK PAIN WITHOUT SCIATICA: Primary | ICD-10-CM

## 2024-11-21 PROCEDURE — 97112 NEUROMUSCULAR REEDUCATION: CPT | Mod: PN

## 2024-11-21 PROCEDURE — 97110 THERAPEUTIC EXERCISES: CPT | Mod: PN

## 2024-11-21 NOTE — PROGRESS NOTES
[facility]   Physical Therapy Treatment Note      Name: Rocio Arriaga  Clinic Number: [id number]    Therapy Diagnosis: No diagnosis found.  Physician: Pro Gallagher MD    Visit Date: 11/21/2024    Therapy Diagnosis:        Encounter Diagnoses   Name Primary?    Chronic pain of multiple joints      Chronic bilateral low back pain without sciatica        Physician: Pro Gallagher MD     Physician Orders: PT Eval and Treat   Medical Diagnosis from Referral: Diagnosis M25.50,G89.29 (ICD-10-CM) - Chronic pain of multiple joints M54.50,G89.29 (ICD-10-CM) - Chronic bilateral low back pain without sciatica  Evaluation Date: 11/7/2024  Authorization Period Expiration: 12/31/2024  Plan of Care Expiration: 12/19/2024  Progress Note Due: visit 4  Visit #  3/8  Visits authorized: 1/ 1   FOTO: 1/3     Precautions: Standard      Time In: 1115  Time Out: 1200  Total Appointment Time (timed & untimed codes): 40 minutes    PTA Visit #: 0/5       Subjective     Patient reports: worse pain secondary to her bed being broken.  She was compliant with home exercise program.  Response to previous treatment: no significant changes but she is appreciative of learning about back care  Functional change: NA    Pain: 8/10  Location: bilateral back      Objective      Objective Measures updated at progress report unless specified.     Treatment     Rocio received the treatments listed below:      therapeutic exercises to develop strength, posture, and core stabilization for 25 minutes including:  Supine, 3x10 diaphragmatic breathing with TA activation  Supine, 3x10 hip/knee flexion/extension, feet on small red swiss ball   Supine, 2x10 lower trunk rotation  Supine, 2x10 bilaterally, small marching steps  Supine, 1x10, ball squeeze/adduction  Supine, 1x10, hip abduction, yellow theraband  Seated,  2x5, back stretch, rolling large red swiss ball, 10 sec hold    manual therapy techniques: Soft tissue  Mobilization were applied to the: ___ for ___ minutes, including:    neuromuscular re-education activities to improve: Posture for ___ minutes. The following activities were included:    therapeutic activities to improve functional performance for   minutes, including:    gait training to improve functional mobility and safety for  minutes, including:    direct contact modalities after being cleared for contraindications:     supervised modalities after being cleared for contradictions:     hot pack for 15 minutes to low back, seated.    cold pack for  minutes to .    Patient Education and Home Exercises       Education provided:   - PT requested working on core strengthening at home, TA muscle engagement with purpose, during normal activity, when brushing teeth, sitting at a stop sign or a traffic light.    Written Home Exercises Provided:  pending . Exercises were reviewed and Rocio was able to demonstrate them prior to the end of the session.  Rocio demonstrated good  understanding of the education provided. See Electronic Medical Record under Patient Instructions for exercises provided during therapy sessions    Assessment     Rocio is having greater pain secondary to her bed being broken and forcing her sleep flat instead of head and knees elevated. Response to this session was more    Rocio Is progressing well towards her goals.   Patient prognosis is Excellent.     Patient will continue to benefit from skilled outpatient physical therapy to address the deficits listed in the problem list box on initial evaluation, provide pt/family education and to maximize pt's level of independence in the home and community environment.     Patient's spiritual, cultural and educational needs considered and pt agreeable to plan of care and goals.     Anticipated barriers to physical therapy: chronic nature    Goals: pain management and core strengthening    Plan     Plan of care Certification: 11/7/2024 to  12/19/2024.     Outpatient Physical Therapy 2 times weekly for 4 weeks to include the following interventions: Electrical Stimulation  , Manual Therapy, Moist Heat/ Ice, Neuromuscular Re-ed, Patient Education, Therapeutic Activities, and Therapeutic Exercise.    Tawanda Charlton, PT

## 2024-11-27 ENCOUNTER — OFFICE VISIT (OUTPATIENT)
Dept: INTERNAL MEDICINE | Facility: CLINIC | Age: 54
End: 2024-11-27
Payer: MEDICAID

## 2024-11-27 ENCOUNTER — CLINICAL SUPPORT (OUTPATIENT)
Dept: REHABILITATION | Facility: HOSPITAL | Age: 54
End: 2024-11-27
Payer: MEDICAID

## 2024-11-27 VITALS
RESPIRATION RATE: 20 BRPM | SYSTOLIC BLOOD PRESSURE: 118 MMHG | HEART RATE: 70 BPM | DIASTOLIC BLOOD PRESSURE: 78 MMHG | HEIGHT: 65 IN | BODY MASS INDEX: 38.24 KG/M2 | WEIGHT: 229.5 LBS | OXYGEN SATURATION: 98 %

## 2024-11-27 DIAGNOSIS — G89.29 CHRONIC BILATERAL LOW BACK PAIN WITHOUT SCIATICA: Primary | ICD-10-CM

## 2024-11-27 DIAGNOSIS — M54.50 CHRONIC BILATERAL LOW BACK PAIN WITHOUT SCIATICA: Primary | ICD-10-CM

## 2024-11-27 DIAGNOSIS — R10.9 ABDOMINAL PAIN, UNSPECIFIED ABDOMINAL LOCATION: Primary | ICD-10-CM

## 2024-11-27 PROCEDURE — 3078F DIAST BP <80 MM HG: CPT | Mod: CPTII,,, | Performed by: NURSE PRACTITIONER

## 2024-11-27 PROCEDURE — 4010F ACE/ARB THERAPY RXD/TAKEN: CPT | Mod: CPTII,,, | Performed by: NURSE PRACTITIONER

## 2024-11-27 PROCEDURE — 3008F BODY MASS INDEX DOCD: CPT | Mod: CPTII,,, | Performed by: NURSE PRACTITIONER

## 2024-11-27 PROCEDURE — 3074F SYST BP LT 130 MM HG: CPT | Mod: CPTII,,, | Performed by: NURSE PRACTITIONER

## 2024-11-27 PROCEDURE — 99213 OFFICE O/P EST LOW 20 MIN: CPT | Mod: PBBFAC | Performed by: NURSE PRACTITIONER

## 2024-11-27 PROCEDURE — 1159F MED LIST DOCD IN RCRD: CPT | Mod: CPTII,,, | Performed by: NURSE PRACTITIONER

## 2024-11-27 PROCEDURE — 97110 THERAPEUTIC EXERCISES: CPT | Mod: PN,CQ

## 2024-11-27 PROCEDURE — 99214 OFFICE O/P EST MOD 30 MIN: CPT | Mod: S$PBB,,, | Performed by: NURSE PRACTITIONER

## 2024-11-27 PROCEDURE — 99999 PR PBB SHADOW E&M-EST. PATIENT-LVL III: CPT | Mod: PBBFAC,,, | Performed by: NURSE PRACTITIONER

## 2024-11-27 RX ORDER — HYDROCODONE BITARTRATE AND ACETAMINOPHEN 5; 325 MG/1; MG/1
1 TABLET ORAL EVERY 6 HOURS PRN
Qty: 12 TABLET | Refills: 0 | Status: SHIPPED | OUTPATIENT
Start: 2024-11-27

## 2024-11-27 NOTE — PROGRESS NOTES
"[facility]   Physical Therapy Treatment Note      Name: Rocio Arriaga  Clinic Number: [id number]    Physician: Pro Gallagher MD    Visit Date: 11/27/2024    Therapy Diagnosis:        Encounter Diagnoses   Name Primary?    Chronic pain of multiple joints      Chronic bilateral low back pain without sciatica        Physician: Pro Gallagher MD     Physician Orders: PT Eval and Treat   Medical Diagnosis from Referral: Diagnosis M25.50,G89.29 (ICD-10-CM) - Chronic pain of multiple joints M54.50,G89.29 (ICD-10-CM) - Chronic bilateral low back pain without sciatica  Evaluation Date: 11/7/2024  Authorization Period Expiration: 12/31/2024  Plan of Care Expiration: 12/19/2024  Progress Note Due: visit 4  Visit #  4/8  Visits authorized: 1/ 1   FOTO: 1/3     Precautions: Standard      Time In: 945  Time Out: 1011  Total Appointment Time (timed & untimed codes): 26 minutes    PTA Visit #: 1/5       Subjective     Patient reports: gallbladder issues this morning. Pt states her back is stiff.   She was compliant with home exercise program.  Response to previous treatment: no significant changes but she is appreciative of learning about back care  Functional change: NA    Pain: 9/10 "bc of my gallbladder"  Location: bilateral back      Objective      Objective Measures updated at progress report unless specified.     Treatment     Rocio received the treatments listed below:      therapeutic exercises to develop strength, posture, and core stabilization for 26 minutes including:  Supine, 3x10 diaphragmatic breathing with TA activation  Supine, 3x10 hip/knee flexion/extension, feet on small red swiss ball   Supine, 2x10 lower trunk rotation  Supine, 2x10 bilaterally, small marching steps  Supine, 2x10, ball squeeze/adduction  Supine, 2x10, hip abduction, yellow theraband  Seated,  2x5, back stretch, rolling large red swiss ball, 10 sec hold  Seated, 1x5, L lateral roll out     supervised " modalities after being cleared for contradictions:     hot pack for 15 minutes to low back, supine during supine position.      Patient Education and Home Exercises       Education provided:   - PT requested working on core strengthening at home, TA muscle engagement with purpose, during normal activity, when brushing teeth, sitting at a stop sign or a traffic light.    Written Home Exercises Provided:  pending . Exercises were reviewed and Rocio was able to demonstrate them prior to the end of the session.  Rocio demonstrated good  understanding of the education provided. See Electronic Medical Record under Patient Instructions for exercises provided during therapy sessions    Assessment     Pt completed ex's within her tolerance today. She was not able to progress d/t reported gallbladder issues.     Rocio Is progressing well towards her goals.   Patient prognosis is Excellent.     Patient will continue to benefit from skilled outpatient physical therapy to address the deficits listed in the problem list box on initial evaluation, provide pt/family education and to maximize pt's level of independence in the home and community environment.     Patient's spiritual, cultural and educational needs considered and pt agreeable to plan of care and goals.     Anticipated barriers to physical therapy: chronic nature    Goals:   Short Term Goals: 2 weeks   1. Patient demonstrates independence with HEP.   2. Patient demonstrates independence with Postural Awareness.   3. Patient demonstrates independence with body mechanics.   Long Term Goals: 4 weeks   1. Patient demonstrates decreased TTP to min to nil to improve tolerance to functional activities.   2. Patient demonstrates improved core stability from sit to stand to decrease overall pain.  3. Patient demonstrates improved overall function per Oswestry to 24% or less.     Plan     Plan of care Certification: 11/7/2024 to 12/19/2024.     Outpatient Physical  Therapy 2 times weekly for 4 weeks to include the following interventions: Electrical Stimulation  , Manual Therapy, Moist Heat/ Ice, Neuromuscular Re-ed, Patient Education, Therapeutic Activities, and Therapeutic Exercise.    Glory Roberts, PTA

## 2024-11-27 NOTE — PROGRESS NOTES
Subjective:       Patient ID: Rocio Arriaga is a 54 y.o. female.    Chief Complaint: Flank Pain      History of Present Illness    CHIEF COMPLAINT:  Rocio presents today with gallbladder pain.    GALLBLADDER ISSUES:  She reports a history of gallstones and has been experiencing gallbladder pain for over a week, with fluctuations in intensity. The pain worsens with certain foods and movements and is accompanied by belching. She has not undergone surgery due to fear of the procedure. She has attempted natural remedies, including apple juice, olive oil, and lemon juice, which have increased her glucose levels. She experiences mild nausea but denies fever.    MEDICAL HISTORY:  She has a history of psoriasis and psoriatic arthritis. She also reports experiencing fever blisters, typically when she becomes ill.      ROS:  Constitutional: -fevers,   Gastrointestinal: +abdominal pain, +nausea + belching         Objective:      Physical Exam    General: No acute distress. Well-developed. Well-nourished.  Eyes: EOMI. Sclerae anicteric.  HENT: Normocephalic. Atraumatic. Nares patent. Moist oral mucosa.  Ears: Bilateral TMs clear. Bilateral EACs clear.  Cardiovascular: Regular rate. Regular rhythm. No murmurs. No rubs. No gallops. Normal S1, S2.  Respiratory: Normal respiratory effort. Clear to auscultation bilaterally. No rales. No rhonchi. No wheezing. Clear lungs.  Abdomen: Soft. Pain on palpation of right side under rib cage- none on abd or epigastric area . Non-distended. Normoactive bowel sounds.  Musculoskeletal: No  obvious deformity.  Extremities: No lower extremity edema.  Neurological: Alert & oriented x3. No slurred speech. Normal gait.  Psychiatric: Normal mood. Normal affect. Good insight. Good judgment.  Skin: Warm. Dry. No rash.          Assessment:       1. Abdominal pain, unspecified abdominal location        Plan:     Problem List Items Addressed This Visit    None  Visit Diagnoses       Abdominal pain,  unspecified abdominal location    -  Primary    Relevant Medications    HYDROcodone-acetaminophen (NORCO) 5-325 mg per tablet    Other Relevant Orders    US Abdomen Limited_Gallbladder          Assessment & Plan    Suspected gallbladder issues based on patient's history and current symptoms  Previous gallstone diagnosis, but last imaging in 2014 showed no stones or duct dilation  Recent labs showed normal liver function and bilirubin levels  Considering gallstones or gallbladder dysfunction as potential causes  Planning diagnostic workup starting with ultrasound, potentially followed by HIDA scan    GALLSTONES AND GALLBLADDER FUNCTION:  - Explained that gallstones do not always show on ultrasound.  - Discussed HIDA scan's role in assessing gallbladder function.  - Informed patient that gallbladder removal is typically recommended if function is less than 30%.  - Ultrasound of gallbladder ordered.  - Plan to refer to general surgery pending diagnostic results.    PAIN MANAGEMENT:  - Started pain medication: short course sent to [facility] pharmacy for use if pain worsens over the holiday weekend.  - Contact the office or go to ER if pain significantly worsens.    PHYSICAL THERAPY:  - Rocio to avoid needling or physical massage during physical therapy.  - Rocio to continue with exercise portion of physical therapy as scheduled.    FOLLOW-UP:  - Follow up after ultrasound results are available.          This note was generated with the assistance of ambient listening technology. Verbal consent was obtained by the patient and accompanying visitor(s) for the recording of patient appointment to facilitate this note. I attest to having reviewed and edited the generated note for accuracy, though some syntax or spelling errors may persist. Please contact the author of this note for any clarification.

## 2024-11-29 ENCOUNTER — HOSPITAL ENCOUNTER (EMERGENCY)
Facility: HOSPITAL | Age: 54
Discharge: HOME | End: 2024-11-29
Attending: STUDENT IN AN ORGANIZED HEALTH CARE EDUCATION/TRAINING PROGRAM
Payer: MEDICAID

## 2024-11-29 VITALS
OXYGEN SATURATION: 98 % | HEART RATE: 67 BPM | TEMPERATURE: 99 F | WEIGHT: 230.06 LBS | RESPIRATION RATE: 18 BRPM | SYSTOLIC BLOOD PRESSURE: 112 MMHG | DIASTOLIC BLOOD PRESSURE: 56 MMHG | HEIGHT: 65 IN | BODY MASS INDEX: 38.33 KG/M2

## 2024-11-29 DIAGNOSIS — R10.11 RUQ ABDOMINAL PAIN: Primary | ICD-10-CM

## 2024-11-29 LAB
ALBUMIN SERPL BCP-MCNC: 4 G/DL (ref 3.5–5.2)
ALP SERPL-CCNC: 79 U/L (ref 40–150)
ALT SERPL W/O P-5'-P-CCNC: 29 U/L (ref 10–44)
ANION GAP SERPL CALC-SCNC: 9 MMOL/L (ref 8–16)
AST SERPL-CCNC: 23 U/L (ref 10–40)
BASOPHILS # BLD AUTO: 0.05 K/UL (ref 0–0.2)
BASOPHILS NFR BLD: 0.7 % (ref 0–1.9)
BILIRUB SERPL-MCNC: 0.6 MG/DL (ref 0.1–1)
BILIRUB UR QL STRIP: NEGATIVE
BUN SERPL-MCNC: 16 MG/DL (ref 6–20)
CALCIUM SERPL-MCNC: 9.2 MG/DL (ref 8.7–10.5)
CHLORIDE SERPL-SCNC: 108 MMOL/L (ref 95–110)
CLARITY UR: CLEAR
CO2 SERPL-SCNC: 23 MMOL/L (ref 23–29)
COLOR UR: YELLOW
CREAT SERPL-MCNC: 0.9 MG/DL (ref 0.5–1.4)
DIFFERENTIAL METHOD BLD: NORMAL
EOSINOPHIL # BLD AUTO: 0.1 K/UL (ref 0–0.5)
EOSINOPHIL NFR BLD: 1.8 % (ref 0–8)
ERYTHROCYTE [DISTWIDTH] IN BLOOD BY AUTOMATED COUNT: 13.6 % (ref 11.5–14.5)
EST. GFR  (NO RACE VARIABLE): >60 ML/MIN/1.73 M^2
GLUCOSE SERPL-MCNC: 97 MG/DL (ref 70–110)
GLUCOSE UR QL STRIP: NEGATIVE
HCT VFR BLD AUTO: 37.6 % (ref 37–48.5)
HGB BLD-MCNC: 12.8 G/DL (ref 12–16)
HGB UR QL STRIP: ABNORMAL
IMM GRANULOCYTES # BLD AUTO: 0.03 K/UL (ref 0–0.04)
IMM GRANULOCYTES NFR BLD AUTO: 0.4 % (ref 0–0.5)
KETONES UR QL STRIP: NEGATIVE
LACTATE SERPL-SCNC: 1.5 MMOL/L (ref 0.5–2.2)
LEUKOCYTE ESTERASE UR QL STRIP: NEGATIVE
LIPASE SERPL-CCNC: 16 U/L (ref 4–60)
LYMPHOCYTES # BLD AUTO: 2.4 K/UL (ref 1–4.8)
LYMPHOCYTES NFR BLD: 33.1 % (ref 18–48)
MCH RBC QN AUTO: 29.8 PG (ref 27–31)
MCHC RBC AUTO-ENTMCNC: 34 G/DL (ref 32–36)
MCV RBC AUTO: 87 FL (ref 82–98)
MONOCYTES # BLD AUTO: 0.6 K/UL (ref 0.3–1)
MONOCYTES NFR BLD: 8.8 % (ref 4–15)
NEUTROPHILS # BLD AUTO: 4 K/UL (ref 1.8–7.7)
NEUTROPHILS NFR BLD: 55.2 % (ref 38–73)
NITRITE UR QL STRIP: NEGATIVE
NRBC BLD-RTO: 0 /100 WBC
PH UR STRIP: 6 [PH] (ref 5–8)
PLATELET # BLD AUTO: 318 K/UL (ref 150–450)
PMV BLD AUTO: 9.7 FL (ref 9.2–12.9)
POTASSIUM SERPL-SCNC: 4.1 MMOL/L (ref 3.5–5.1)
PROT SERPL-MCNC: 7.9 G/DL (ref 6–8.4)
PROT UR QL STRIP: NEGATIVE
RBC # BLD AUTO: 4.3 M/UL (ref 4–5.4)
SODIUM SERPL-SCNC: 140 MMOL/L (ref 136–145)
SP GR UR STRIP: <=1.005 (ref 1–1.03)
URN SPEC COLLECT METH UR: ABNORMAL
UROBILINOGEN UR STRIP-ACNC: NEGATIVE EU/DL
WBC # BLD AUTO: 7.19 K/UL (ref 3.9–12.7)

## 2024-11-29 PROCEDURE — 81003 URINALYSIS AUTO W/O SCOPE: CPT | Performed by: STUDENT IN AN ORGANIZED HEALTH CARE EDUCATION/TRAINING PROGRAM

## 2024-11-29 PROCEDURE — 80053 COMPREHEN METABOLIC PANEL: CPT | Performed by: STUDENT IN AN ORGANIZED HEALTH CARE EDUCATION/TRAINING PROGRAM

## 2024-11-29 PROCEDURE — 63600175 PHARM REV CODE 636 W HCPCS: Performed by: STUDENT IN AN ORGANIZED HEALTH CARE EDUCATION/TRAINING PROGRAM

## 2024-11-29 PROCEDURE — 83605 ASSAY OF LACTIC ACID: CPT | Performed by: STUDENT IN AN ORGANIZED HEALTH CARE EDUCATION/TRAINING PROGRAM

## 2024-11-29 PROCEDURE — 99285 EMERGENCY DEPT VISIT HI MDM: CPT | Mod: 25

## 2024-11-29 PROCEDURE — 85025 COMPLETE CBC W/AUTO DIFF WBC: CPT | Performed by: STUDENT IN AN ORGANIZED HEALTH CARE EDUCATION/TRAINING PROGRAM

## 2024-11-29 PROCEDURE — 83690 ASSAY OF LIPASE: CPT | Performed by: STUDENT IN AN ORGANIZED HEALTH CARE EDUCATION/TRAINING PROGRAM

## 2024-11-29 PROCEDURE — 96374 THER/PROPH/DIAG INJ IV PUSH: CPT

## 2024-11-29 RX ORDER — KETOROLAC TROMETHAMINE 30 MG/ML
15 INJECTION, SOLUTION INTRAMUSCULAR; INTRAVENOUS ONCE
Status: COMPLETED | OUTPATIENT
Start: 2024-11-29 | End: 2024-11-29

## 2024-11-29 RX ADMIN — KETOROLAC TROMETHAMINE 15 MG: 30 INJECTION, SOLUTION INTRAMUSCULAR; INTRAVENOUS at 08:11

## 2024-11-29 NOTE — ED TRIAGE NOTES
C/o pain to right upper abdomen radiating into right flank area x 1 week. C/o mild diarrhea and nausea.

## 2024-11-29 NOTE — ED PROVIDER NOTES
Encounter Date: 11/29/2024       History     Chief Complaint   Patient presents with    Abdominal Pain     54-year-old female with history of gallstones, hypertension, presenting with right upper quadrant pain for the last couple of days.  Patient reports that she has been informed that surgery is an option for, however she is nervous about the treatment.  Denies any fever, vomiting, diarrhea.  No dysuria or back pain.  Patient reports that she has an outpatient appointment for three weeks for an ultrasound, however she states she wants to be treated sooner.      Review of patient's allergies indicates:   Allergen Reactions    Gluten protein      Past Medical History:   Diagnosis Date    Anxiety     Depression     Fatigue     Headache     Hypertension     Psoriasis     Psoriatic arthritis     Therapy      Past Surgical History:   Procedure Laterality Date    hypertension       Family History   Problem Relation Name Age of Onset    Depression Mother Kasie Arriaga     Anxiety disorder Mother Kasienaomi Arriaga     Diabetes Mother Kasie Arriaga     Hypertension Mother Kasie Arriaga     Depression Father Tim Arriaga     Anxiety disorder Father Tim Arriaga     Diabetes Father Tim rAriaga     Vision loss Father Tim Arriaga     Stroke Sister Jovanna     Liver cancer Brother Tim         alcohol and drug induced    Drug abuse Brother Octaviano     Alcohol abuse Brother Octaviano     Cancer Brother Octaviano     Depression Brother Amrik     Depression Brother Lex     Anxiety disorder Brother Lex     Cancer Maternal Grandmother Mariaa     Breast cancer Neg Hx      Colon cancer Neg Hx      Ovarian cancer Neg Hx       Social History     Tobacco Use    Smoking status: Never     Passive exposure: Never    Smokeless tobacco: Never   Substance Use Topics    Alcohol use: Not Currently     Alcohol/week: 1.0 standard drink of alcohol     Types: 1 Glasses of wine per week     Comment: Occasionally    Drug use: Never     Review of  Systems   Constitutional:  Negative for fever.   HENT:  Negative for congestion and sore throat.    Respiratory:  Negative for cough and shortness of breath.    Cardiovascular:  Negative for chest pain.   Gastrointestinal:  Positive for abdominal pain. Negative for diarrhea, nausea and vomiting.   Genitourinary:  Negative for dysuria.   Musculoskeletal:  Negative for back pain.   Skin:  Negative for rash.   Neurological:  Negative for weakness.   Hematological:  Does not bruise/bleed easily.       Physical Exam     Initial Vitals [11/29/24 0749]   BP Pulse Resp Temp SpO2   120/69 82 19 98.7 °F (37.1 °C) 98 %      MAP       --         Physical Exam    Nursing note and vitals reviewed.  Constitutional: She appears well-developed.   HENT:   Head: Normocephalic.   Eyes: Pupils are equal, round, and reactive to light.   Neck:   Normal range of motion.  Cardiovascular:            No murmur heard.  Pulmonary/Chest: No respiratory distress.   Abdominal: Abdomen is soft.   Right upper quadrant tenderness to palpation.  No other tenderness to palpation.  No rebound or guarding. No CVAT.   Musculoskeletal:         General: No edema.      Cervical back: Normal range of motion.     Neurological: She is alert.   Skin: Skin is warm.   Psychiatric: She has a normal mood and affect.         ED Course   Procedures  Labs Reviewed   CBC W/ AUTO DIFFERENTIAL   COMPREHENSIVE METABOLIC PANEL   LIPASE   LACTIC ACID, PLASMA   URINALYSIS, REFLEX TO URINE CULTURE          Imaging Results    None          Medications - No data to display  Medical Decision Making  DDX: R/o acute cholecystitis, biliary colic, pancreatitis. Less likely cholangitis, choledocholithiasis but will screen on LFTs. Unlikely appendicitis, diverticulitis, SBO given history, no low abdominal TTP.  DX: BMP, CBC, LFT, lipase. UA. RUQ sono. Consider CTAP if change in exam.  TX: Analgesia, antiemetic PRN. Treatment/consult as indicated by studies.  DISPO: Pending studies. If  studies WNL, symptoms controlled, likely discharge to f/u with PMD within 2 days with precautions for return and recommendations for supportive care.        Amount and/or Complexity of Data Reviewed  Labs: ordered.  Radiology: ordered.                                      Clinical Impression:  Final diagnoses:  [R10.11] RUQ abdominal pain                 Dillan Mckay MD  11/29/24 0809

## 2024-12-03 ENCOUNTER — CLINICAL SUPPORT (OUTPATIENT)
Dept: REHABILITATION | Facility: HOSPITAL | Age: 54
End: 2024-12-03
Payer: MEDICAID

## 2024-12-03 DIAGNOSIS — M54.50 CHRONIC BILATERAL LOW BACK PAIN WITHOUT SCIATICA: Primary | ICD-10-CM

## 2024-12-03 DIAGNOSIS — G89.29 CHRONIC BILATERAL LOW BACK PAIN WITHOUT SCIATICA: Primary | ICD-10-CM

## 2024-12-03 PROCEDURE — 97110 THERAPEUTIC EXERCISES: CPT | Mod: PN,CQ

## 2024-12-03 NOTE — PROGRESS NOTES
[facility]   Physical Therapy Treatment Note      Name: Rocio Arriaga  Clinic Number: [id number]    Physician: Pro Gallagher MD    Visit Date: 12/3/2024    Therapy Diagnosis:        Encounter Diagnoses   Name Primary?    Chronic pain of multiple joints      Chronic bilateral low back pain without sciatica        Physician: Pro Gallagher MD     Physician Orders: PT Eval and Treat   Medical Diagnosis from Referral: Diagnosis M25.50,G89.29 (ICD-10-CM) - Chronic pain of multiple joints M54.50,G89.29 (ICD-10-CM) - Chronic bilateral low back pain without sciatica  Evaluation Date: 11/7/2024  Authorization Period Expiration: 12/31/2024  Plan of Care Expiration: 12/19/2024  Progress Note Due: visit 4  Visit #  5/8  Visits authorized: 2/ 1   FOTO: 1/3     Precautions: Standard      Time In: 900  Time Out: 939  Total Appointment Time (timed & untimed codes): 39 minutes    PTA Visit #: 2/5       Subjective     Patient reports: gallbladder issues feeling a little better this morning. Pt states her back is stiff.   She was compliant with home exercise program.  Response to previous treatment:   Functional change: NA    Pain: 3/10  Location: bilateral back      Objective      Objective Measures updated at progress report unless specified.     Treatment     Rocio received the treatments listed below:      therapeutic exercises to develop strength, posture, and core stabilization for 39 minutes including:  Supine, 3x10 diaphragmatic breathing with TA activation  Supine, 3x10 hip/knee flexion/extension, feet on small red swiss ball   Supine, 2x10 lower trunk rotation  Supine, 2x10 bilaterally, small marching steps  Supine, 2x10, ball squeeze/adduction  Supine, 2x10, hip abduction, red theraband  Seated, 2x5, back stretch, rolling large red swiss ball, 10 sec hold  Seated, 1x5, B lateral roll out  Supine, 3x15 sec HSS w/ strap assist  Seated C-curve 3# rainbow 10x's each  "direction    supervised modalities after being cleared for contradictions:     hot pack for 15 minutes to low back, supine during supine position.      Patient Education and Home Exercises       Education provided:   - PT requested working on core strengthening at home, TA muscle engagement with purpose, during normal activity, when brushing teeth, sitting at a stop sign or a traffic light.    Written Home Exercises Provided:  pending . Exercises were reviewed and Rocio was able to demonstrate them prior to the end of the session.  Rocio demonstrated good  understanding of the education provided. See Electronic Medical Record under Patient Instructions for exercises provided during therapy sessions    Assessment     Pt reported "popping" in lumbar spine with R LE flexion. Revised this ex with decreased ROM and added iso hold with good response. "Popping" ceased with revision.      Rocio Is progressing well towards her goals.   Patient prognosis is Excellent.     Patient will continue to benefit from skilled outpatient physical therapy to address the deficits listed in the problem list box on initial evaluation, provide pt/family education and to maximize pt's level of independence in the home and community environment.     Patient's spiritual, cultural and educational needs considered and pt agreeable to plan of care and goals.     Anticipated barriers to physical therapy: chronic nature    Goals:   Short Term Goals: 2 weeks   1. Patient demonstrates independence with HEP.   2. Patient demonstrates independence with Postural Awareness.   3. Patient demonstrates independence with body mechanics.   Long Term Goals: 4 weeks   1. Patient demonstrates decreased TTP to min to nil to improve tolerance to functional activities.   2. Patient demonstrates improved core stability from sit to stand to decrease overall pain.  3. Patient demonstrates improved overall function per Oswestry to 24% or less.     Plan "     Plan of care Certification: 11/7/2024 to 12/19/2024.     Outpatient Physical Therapy 2 times weekly for 4 weeks to include the following interventions: Electrical Stimulation  , Manual Therapy, Moist Heat/ Ice, Neuromuscular Re-ed, Patient Education, Therapeutic Activities, and Therapeutic Exercise.    Glory Roberts, PTA

## 2024-12-04 ENCOUNTER — LAB VISIT (OUTPATIENT)
Dept: LAB | Facility: HOSPITAL | Age: 54
End: 2024-12-04
Attending: INTERNAL MEDICINE
Payer: MEDICAID

## 2024-12-04 DIAGNOSIS — L40.50 PSORIATIC ARTHRITIS: ICD-10-CM

## 2024-12-04 DIAGNOSIS — K80.50 BILIARY COLIC: ICD-10-CM

## 2024-12-04 DIAGNOSIS — K80.42 CALCULUS OF BILE DUCT WITH ACUTE CHOLECYSTITIS WITHOUT OBSTRUCTION: Primary | ICD-10-CM

## 2024-12-04 PROCEDURE — 86480 TB TEST CELL IMMUN MEASURE: CPT | Performed by: INTERNAL MEDICINE

## 2024-12-05 ENCOUNTER — CLINICAL SUPPORT (OUTPATIENT)
Dept: REHABILITATION | Facility: HOSPITAL | Age: 54
End: 2024-12-05
Payer: MEDICAID

## 2024-12-05 DIAGNOSIS — G89.29 CHRONIC BILATERAL LOW BACK PAIN WITHOUT SCIATICA: Primary | ICD-10-CM

## 2024-12-05 DIAGNOSIS — G89.29 CHRONIC PAIN OF MULTIPLE JOINTS: ICD-10-CM

## 2024-12-05 DIAGNOSIS — M54.50 CHRONIC BILATERAL LOW BACK PAIN WITHOUT SCIATICA: Primary | ICD-10-CM

## 2024-12-05 DIAGNOSIS — M25.50 CHRONIC PAIN OF MULTIPLE JOINTS: ICD-10-CM

## 2024-12-05 LAB
GAMMA INTERFERON BACKGROUND BLD IA-ACNC: 0 IU/ML
M TB IFN-G CD4+ BCKGRND COR BLD-ACNC: 0 IU/ML
M TB IFN-G CD4+ BCKGRND COR BLD-ACNC: 0 IU/ML
MITOGEN IGNF BCKGRD COR BLD-ACNC: 10 IU/ML
TB GOLD PLUS: NEGATIVE

## 2024-12-05 PROCEDURE — 97110 THERAPEUTIC EXERCISES: CPT | Mod: PN

## 2024-12-05 NOTE — PROGRESS NOTES
[facility]   Physical Therapy Treatment Note      Name: Rocio Arriaga  Clinic Number: [id number]    Physician: Pro Gallagher MD    Visit Date: 12/5/2024    Therapy Diagnosis:        Encounter Diagnoses   Name Primary?    Chronic pain of multiple joints      Chronic bilateral low back pain without sciatica        Physician: Pro Gallagher MD     Physician Orders: PT Eval and Treat   Medical Diagnosis from Referral: Diagnosis M25.50,G89.29 (ICD-10-CM) - Chronic pain of multiple joints M54.50,G89.29 (ICD-10-CM) - Chronic bilateral low back pain without sciatica  Evaluation Date: 11/7/2024  Authorization Period Expiration: 12/31/2024  Plan of Care Expiration: 12/19/2024  Progress Note Due: visit 4  Visit #  5/8  Visits authorized: 2/ 1   FOTO: 1/3     Precautions: Standard      Time In: 900  Time Out: 939  Total Appointment Time (timed & untimed codes): 39 minutes    PTA Visit #: 2/5       Subjective     Patient reports: gallbladder issues feeling a little better this morning. Pt states her back is stiff.   She was compliant with home exercise program.  Response to previous treatment:   Functional change: NA    Pain: 3/10  Location: bilateral back      Objective      Objective Measures updated at progress report unless specified.     Treatment     Rocio received the treatments listed below:      therapeutic exercises to develop strength, posture, and core stabilization for 39 minutes including:  Supine, 3x10 diaphragmatic breathing with TA activation  Supine, 3x10 hip/knee flexion/extension, feet on small red swiss ball   Supine, 2x10 lower trunk rotation  Supine, 2x10 bilaterally, small marching steps  Supine, 2x10, ball squeeze/adduction  Supine, 2x10, hip abduction, red theraband  Seated, 2x5, back stretch, rolling large red swiss ball, 10 sec hold  Seated, 1x5, B lateral roll out  Supine, 3x15 sec HSS w/ strap assist  Seated C-curve 3# rainbow 10x's each  "direction    supervised modalities after being cleared for contradictions:     hot pack for 15 minutes to low back, supine during supine position.      Patient Education and Home Exercises       Education provided:   - PT requested working on core strengthening at home, TA muscle engagement with purpose, during normal activity, when brushing teeth, sitting at a stop sign or a traffic light.    Written Home Exercises Provided:  pending . Exercises were reviewed and Rocio was able to demonstrate them prior to the end of the session.  Rocio demonstrated good  understanding of the education provided. See Electronic Medical Record under Patient Instructions for exercises provided during therapy sessions    Assessment     Pt reported "popping" in lumbar spine with R LE flexion. Revised this ex with decreased ROM and added iso hold with good response. "Popping" ceased with revision.      Rocio Is progressing well towards her goals.   Patient prognosis is Excellent.     Patient will continue to benefit from skilled outpatient physical therapy to address the deficits listed in the problem list box on initial evaluation, provide pt/family education and to maximize pt's level of independence in the home and community environment.     Patient's spiritual, cultural and educational needs considered and pt agreeable to plan of care and goals.     Anticipated barriers to physical therapy: chronic nature    Goals:   Short Term Goals: 2 weeks   1. Patient demonstrates independence with HEP.   2. Patient demonstrates independence with Postural Awareness.   3. Patient demonstrates independence with body mechanics.   Long Term Goals: 4 weeks   1. Patient demonstrates decreased TTP to min to nil to improve tolerance to functional activities.   2. Patient demonstrates improved core stability from sit to stand to decrease overall pain.  3. Patient demonstrates improved overall function per Oswestry to 24% or less.     Plan "     Plan of care Certification: 11/7/2024 to 12/19/2024.     Outpatient Physical Therapy 2 times weekly for 4 weeks to include the following interventions: Electrical Stimulation  , Manual Therapy, Moist Heat/ Ice, Neuromuscular Re-ed, Patient Education, Therapeutic Activities, and Therapeutic Exercise.    Felisa Jimenes, PT

## 2024-12-06 ENCOUNTER — PATIENT MESSAGE (OUTPATIENT)
Dept: FAMILY MEDICINE | Facility: CLINIC | Age: 54
End: 2024-12-06
Payer: MEDICAID

## 2024-12-06 ENCOUNTER — PATIENT MESSAGE (OUTPATIENT)
Dept: ADMINISTRATIVE | Facility: HOSPITAL | Age: 54
End: 2024-12-06
Payer: MEDICAID

## 2024-12-12 ENCOUNTER — OFFICE VISIT (OUTPATIENT)
Dept: PSYCHIATRY | Facility: CLINIC | Age: 54
End: 2024-12-12
Payer: MEDICAID

## 2024-12-12 VITALS
DIASTOLIC BLOOD PRESSURE: 68 MMHG | HEART RATE: 85 BPM | BODY MASS INDEX: 39.07 KG/M2 | WEIGHT: 234.5 LBS | RESPIRATION RATE: 17 BRPM | HEIGHT: 65 IN | OXYGEN SATURATION: 98 % | SYSTOLIC BLOOD PRESSURE: 112 MMHG

## 2024-12-12 DIAGNOSIS — E66.9 OBESITY (BMI 30-39.9): ICD-10-CM

## 2024-12-12 DIAGNOSIS — F60.3 BORDERLINE PERSONALITY DISORDER IN ADULT: ICD-10-CM

## 2024-12-12 DIAGNOSIS — F33.0 MILD EPISODE OF RECURRENT MAJOR DEPRESSIVE DISORDER: ICD-10-CM

## 2024-12-12 DIAGNOSIS — F41.1 GAD (GENERALIZED ANXIETY DISORDER): Primary | ICD-10-CM

## 2024-12-12 DIAGNOSIS — F33.2 SEVERE EPISODE OF RECURRENT MAJOR DEPRESSIVE DISORDER, WITHOUT PSYCHOTIC FEATURES: ICD-10-CM

## 2024-12-12 PROCEDURE — 3074F SYST BP LT 130 MM HG: CPT | Mod: CPTII,,,

## 2024-12-12 PROCEDURE — 99213 OFFICE O/P EST LOW 20 MIN: CPT | Mod: PBBFAC

## 2024-12-12 PROCEDURE — 90833 PSYTX W PT W E/M 30 MIN: CPT | Mod: ,,,

## 2024-12-12 PROCEDURE — 3078F DIAST BP <80 MM HG: CPT | Mod: CPTII,,,

## 2024-12-12 PROCEDURE — 99999 PR PBB SHADOW E&M-EST. PATIENT-LVL III: CPT | Mod: PBBFAC,,,

## 2024-12-12 PROCEDURE — 4010F ACE/ARB THERAPY RXD/TAKEN: CPT | Mod: CPTII,,,

## 2024-12-12 PROCEDURE — 1159F MED LIST DOCD IN RCRD: CPT | Mod: CPTII,,,

## 2024-12-12 PROCEDURE — 3008F BODY MASS INDEX DOCD: CPT | Mod: CPTII,,,

## 2024-12-12 PROCEDURE — 1160F RVW MEDS BY RX/DR IN RCRD: CPT | Mod: CPTII,,,

## 2024-12-12 PROCEDURE — 99214 OFFICE O/P EST MOD 30 MIN: CPT | Mod: S$PBB,,,

## 2024-12-12 RX ORDER — PROPRANOLOL HYDROCHLORIDE 10 MG/1
10 TABLET ORAL 2 TIMES DAILY PRN
Qty: 60 TABLET | Refills: 0 | Status: SHIPPED | OUTPATIENT
Start: 2024-12-12 | End: 2025-01-11

## 2024-12-12 RX ORDER — SERTRALINE HYDROCHLORIDE 50 MG/1
50 TABLET, FILM COATED ORAL DAILY
Qty: 90 TABLET | Refills: 0 | Status: SHIPPED | OUTPATIENT
Start: 2024-12-12 | End: 2025-03-12

## 2024-12-12 NOTE — PROGRESS NOTES
"Outpatient Psychiatry Follow-Up Visit (MD/NP)    12/12/2024    Clinical Status of Patient:  Outpatient (Ambulatory)    Chief Complaint:  Rocio Arriaga is a 54 y.o. female who presents today for follow-up of depression and anxiety.  Met with patient.      Interval History and Content of Current Session:  Interim Events/Subjective Report/Content of Current Session:     Psychotherapy:  Target symptoms: depression, anxiety   Why chosen therapy is appropriate versus another modality: relevant to diagnosis, patient responds to this modality, evidence based practice  Outcome monitoring methods: self-report, observation  Therapeutic intervention type: insight oriented psychotherapy, behavior modifying psychotherapy, supportive psychotherapy, interactive psychotherapy  Topics discussed/themes: building skills sets for symptom management, symptom recognition  The patient's response to the intervention is accepting.   The patient's progress toward treatment goals is good.   Duration of intervention: 16 minutes.    Psychotherapy Add-On + [id number]  16-37 minutes   Time: 16 minutes  Participants: Patient     This patient is known to me. She has been compliant with treatment. She denies any adverse side effects. She reports good tolerability and efficacy.     Today patient states she is "doing better."   Reports her symptoms of anxiety and depression have significantly decreased and have been more manageable since starting Zoloft.  Reports she does have some notable problems with anxiety when she is in social situations.    Reports over the last year she has gained approximately 30 lbs and that is concerning for her.  She states because she works from home now, she eats more often and is less active.     Reports decreased/variable Symptoms of Depression: less diminished mood or loss of interest/anhedonia; reduced symptoms of  irritability, diminished energy, change in sleep, change in appetite, diminished concentration or cognition " "or indecisiveness, and excessive guilt or hopelessness or worthlessness; no PMA/R or suicidal ideations    Reports decreased/variable Symptoms of Anxiety: less excessive anxiety/worry/fear, reduced symptoms of restlessness, fatigue, poor concentration, irritability, muscle tension, and sleep disturbance;  no recurrent panic attacks; without agoraphobia    Sleep: no trouble with initiation/maintenance, no early morning awakening with inability to return to sleep, no hypersomnolence    Appetite: " same"  denies changes in appetite  -She is still working on her diet and trying to do better at controlling her diet and weight (mostly unsuccessful at this time)    No new psychological stressors have occurred since her last appointment.     Her family is stable and supportive.     No new medical issue has occurred since her last appointment.   - chronic Psoriasis    -She has had one ER visits since her last appointment    - C/O RUQ abdominal pain    Reports mood remains stable (no diminished or elevation in mood; normal interest; no expansiveness; no irritability).    Reports concentration and energy remain normal.     Denies suicidal/homicidal ideations. No psychosis noted.    Denied Substance Use: no intoxication, withdrawal, tolerance, used in larger amounts or duration than intended, unsuccessful attempts to limit or quit, increased time engaging in or seeking out, cravings or strong desire to use, failure to fulfill obligations, negative consequences in social/interpersonal/occupational,/recreational areas, use in dangerous situations, or medical or psychological consequences   -no evidence of misuse/abuse or dependence     We discussed medications as below; the patient is in agreement to treatment as documented below.    Discussed dietary changes to aid in weight loss/management.  Encouraged patient to increase activity and decrease caloric intake by eating smaller portions, and  avoiding high sugar drinks.    Review " "of Systems   Psychiatric: Pertinent items are noted in this encounter narrative   Constitutional: No weight gain or loss   Musculoskeletal: Denies pain or stiffness in joints   Neurological: No weakness, sensory changes, seizures, confusion, memory loss, tremor, or other abnormal   Endocrine: Denies polydipsia or polyuria   Integumentary: No rashes or lacerations   Eyes: No exophthalmos, jaundice, or blindness   ENT: Denies dizziness, tinnitus or hearing loss    Respiratory: Denies shortness of breath   Cardiovascular: No chest pain or tachycardia   Gastrointestinal: Denies nausea, vomiting, diarrhea, constipation or pain  Genitourinary: Denies dysuria, frequency, or sexual dysfunction   Hematologic/Lymphatic: Denies excessive bleeding, prolonged or excessive bleeding after invasive procedures, dental extraction or injury   Allergic/Immunologic: Denies allergic response to foods, animals or other materials at this time      Past Medical, Family and Social History: The patient's past medical, family and social history have been reviewed and updated as appropriate within the electronic medical record - see encounter notes.    Compliance: yes    Side effects: None    Risk Parameters:  Patient reports no suicidal ideation  Patient reports no homicidal ideation  Patient reports no self-injurious behavior  Patient reports no violent behavior    Exam (detailed: at least 9 elements; comprehensive: all 15 elements)   Constitutional  Vitals:  Most recent vital signs, dated less than 90 days prior to this appointment, were reviewed.   Vitals:    12/12/24 0857   BP: 112/68   Pulse: 85   Resp: 17   SpO2: 98%   Weight: 106.4 kg (234 lb 8 oz)   Height: 5' 5" (1.651 m)        General:  unremarkable, age appropriate, well nourished, casually dressed, neatly groomed, obese     Musculoskeletal  Muscle Strength/Tone:  no spasicity, no rigidity, no cogwheeling, no flaccidity, no paratonia, no dyskinesia, no dystonia, no tremor, no tic, " no choreoathetosis, no atrophy   Gait & Station:  non-ataxic     Psychiatric  Speech:  no latency; no press, spontaneous   Mood & Affect:  euthymic  congruent and appropriate   Thought Process:  normal and logical   Associations:  intact   Thought Content:  normal, no suicidality, no homicidality, delusions, or paranoia   Insight:  intact, has awareness of illness   Judgement: behavior is adequate to circumstances, age appropriate   Orientation:  grossly intact   Memory: intact for content of interview, grossly intact   Language: grossly intact   Attention Span & Concentration:  able to focus   Fund of Knowledge:  intact and appropriate to age and level of education, familiar with aspects of current personal life     Assessment and Diagnosis   Status/Progress: Based on the examination today, the patient's problem(s) is/are improved and well controlled.  New problems have not been presented today.   Co-morbidities are complicating management of the primary condition.  There are no active rule-out diagnoses for this patient at this time.     General Impression:       ICD-10-CM ICD-9-CM   1. BRIJESH (generalized anxiety disorder)  F41.1 300.02   2. Borderline personality disorder in adult  F60.3 301.83   3. Mild episode of recurrent major depressive disorder  F33.0 296.31   4. Severe episode of recurrent major depressive disorder, without psychotic features  F33.2 296.33       Intervention/Counseling/Treatment Plan   Medication Management: The risks and benefits of medication were discussed with the patient.  Labs, Diagnostic Studies: reviewed lab work from 11/26/2024 with patient   Counseling provided with patient as follows: importance of compliance with chosen treatment options was emphasized, risks and benefits of treatment options, including medications, were discussed with the patient, patient education, instructions for  management, treatment, and follow-up were reviewed      Plan  BRIJESH  Continue Zoloft 50 mg  daily  Start Propranolol 10MG BID PRN anxiety  Refer for psychotherapy, psychotherapy provided during this encounter  Pt. counseled  MDD  Continue Zoloft 50 mg daily  Refer for psychotherapy, psychotherapy provided during this encounter  Pt. Counseled    BPD  Continue Zoloft 50 mg daily  Refer for psychotherapy, psychotherapy provided during this encounter  Pt. Counseled    Obesity  Pt counseled   Not been scheduled to see dietitian yet, nutrition referral ordered on previous visit  Encouraged patient to increase activity and decrease caloric intake  Avoid medications with high potential for excessive weight gain      Therapy   Refer for psychotherapy- pt has not scheduled to see a therapist yet  Psychotherapy provided during this encounter    Medical stressors:  Patient counseled, continue medications/treatment as scheduled f/u with providers as scheduled     Goals:   Develop effective coping skills to manage anxiety and depression  Develop a bedtime routine     Antidepressant/Antianxiety Medication Continued: Patient informed of risks, benefits, and potential side effects of medication and accepts informed consent. Common side effects include nausea, fatigue, headache, insomnia., Specifically discussed the possibility of new or worsening suicidal thoughts/depression. Patient instructed to stop the medication immediately and seek urgent treatment if this occurs. Patient instructed not to abruptly discontinue medication without physician guidance except in cases of sudden onset or worsening of SI.     Discussed with patient informed consent, risks vs. benefits, alternative treatments, side effect profile and the inherent unpredictability of individual responses to these treatments. The patient expresses understanding of the above and displays the capacity to agree with this current plan and had no other questions. The patient also agrees that currently, the benefits outweigh the risks and would like to pursue  treatment at this time   Encouraged Patient to keep future appointments.  Take medications as prescribed and abstain from substance abuse.    Safety plan reviewed with patient for worsening condition or suicidal ideations. In the event of an emergency patient was advised to go to the emergency room.  Patient verbalizes understanding.    The patient was seen and examined. Her chart was reviewed. Reviewed notes by Chary Finnegan NP from 11/27/2024 at 8:00 AM, Dillan Mckay MD from 11/29/2024, and Ana Salinas MD from 12/3/2024 at 1:45 PM.    Approximately 48 minutes of total time spent on this encounter, which includes face to face time, and non-face to face time preparing to see the patient (eg, review of labs/tests), obtaining and or reviewing separately obtained history, documenting clinical information in the electronic or other health record, independently interpreting results (not separately reported) and communicating results to the patient/family/caregiver or care coordination (not separately reported).  Approximately 32 minutes were spent on above with an additional 16 minutes spent on psychotherapy.                                                    Return to Clinic: 3 months or sooner if needed

## 2024-12-16 ENCOUNTER — CLINICAL SUPPORT (OUTPATIENT)
Dept: REHABILITATION | Facility: HOSPITAL | Age: 54
End: 2024-12-16
Payer: MEDICAID

## 2024-12-16 DIAGNOSIS — M54.50 CHRONIC BILATERAL LOW BACK PAIN WITHOUT SCIATICA: Primary | ICD-10-CM

## 2024-12-16 DIAGNOSIS — G89.29 CHRONIC BILATERAL LOW BACK PAIN WITHOUT SCIATICA: Primary | ICD-10-CM

## 2024-12-16 DIAGNOSIS — G89.29 CHRONIC PAIN OF MULTIPLE JOINTS: ICD-10-CM

## 2024-12-16 DIAGNOSIS — M25.50 CHRONIC PAIN OF MULTIPLE JOINTS: ICD-10-CM

## 2024-12-16 PROCEDURE — 97110 THERAPEUTIC EXERCISES: CPT | Mod: PN

## 2024-12-16 NOTE — PROGRESS NOTES
[facility]   Physical Therapy Treatment Note      Name: Rocio Arriaga  Clinic Number: [id number]    Physician: Pro Gallagher MD    Visit Date: 12/16/2024    Therapy Diagnosis:        Encounter Diagnoses   Name Primary?    Chronic pain of multiple joints      Chronic bilateral low back pain without sciatica        Physician: Pro Gallagher MD     Physician Orders: PT Eval and Treat   Medical Diagnosis from Referral: Diagnosis M25.50,G89.29 (ICD-10-CM) - Chronic pain of multiple joints M54.50,G89.29 (ICD-10-CM) - Chronic bilateral low back pain without sciatica  Evaluation Date: 11/7/2024  Authorization Period Expiration: 12/31/2024  Plan of Care Expiration: 12/19/2024  Progress Note Due: visit 4  Visit #  6/8  Visits authorized: 2/ 1   FOTO: 1/3     Precautions: Standard      Time In: 1145  Time Out: 1225  Total Appointment Time (timed & untimed codes): 40 minutes    PTA Visit #: -/5       Subjective     Patient reports: no change.  She was compliant with home exercise program.  Response to previous treatment:   Functional change: NA    Pain: 3/10  Location: bilateral back      Objective      Objective Measures updated at progress report unless specified.     Treatment     Rocio received the treatments listed below:      therapeutic exercises to develop strength, posture, and core stabilization for 39 minutes including:  Supine, 1x10 diaphragmatic breathing with TA activation  Supine, 3x10 hip/knee flexion/extension, feet on small red swiss ball   Supine, 2x10 lower trunk rotation  Supine, 2x10 bilaterally, small marching steps  Supine, 2x10, ball squeeze/adduction  Supine, 2x10, hip abduction, red theraband  Seated, 2x5, back stretch, rolling large red swiss ball, 10 sec hold  Seated, 1x5, B lateral roll out  Supine, 3x15 sec HSS w/ strap assist  Seated C-curve 3# rainbow 10x's each direction    supervised modalities after being cleared for contradictions:     hot pack for  15 minutes to low back, supine during supine position.      Patient Education and Home Exercises       Education provided:   - PT requested working on core strengthening at home, TA muscle engagement with purpose, during normal activity, when brushing teeth, sitting at a stop sign or a traffic light.    Written Home Exercises Provided:  pending . Exercises were reviewed and Rocio was able to demonstrate them prior to the end of the session.  Rocio demonstrated good  understanding of the education provided. See Electronic Medical Record under Patient Instructions for exercises provided during therapy sessions    Assessment     Pt tolerated today's treatment well with no increased pain or discomfort.    Rocio Is progressing well towards her goals.   Patient prognosis is Excellent.     Patient will continue to benefit from skilled outpatient physical therapy to address the deficits listed in the problem list box on initial evaluation, provide pt/family education and to maximize pt's level of independence in the home and community environment.     Patient's spiritual, cultural and educational needs considered and pt agreeable to plan of care and goals.     Anticipated barriers to physical therapy: chronic nature    Goals:   Short Term Goals: 2 weeks   1. Patient demonstrates independence with HEP.   2. Patient demonstrates independence with Postural Awareness.   3. Patient demonstrates independence with body mechanics.   Long Term Goals: 4 weeks   1. Patient demonstrates decreased TTP to min to nil to improve tolerance to functional activities.   2. Patient demonstrates improved core stability from sit to stand to decrease overall pain.  3. Patient demonstrates improved overall function per Oswestry to 24% or less.     Plan     Plan of care Certification: 11/7/2024 to 12/19/2024.     Outpatient Physical Therapy 2 times weekly for 4 weeks to include the following interventions: Electrical Stimulation  , Manual  Therapy, Moist Heat/ Ice, Neuromuscular Re-ed, Patient Education, Therapeutic Activities, and Therapeutic Exercise.    Felisa Jimenes, PT

## 2024-12-19 ENCOUNTER — CLINICAL SUPPORT (OUTPATIENT)
Dept: REHABILITATION | Facility: HOSPITAL | Age: 54
End: 2024-12-19
Payer: MEDICAID

## 2024-12-19 DIAGNOSIS — M25.50 CHRONIC PAIN OF MULTIPLE JOINTS: ICD-10-CM

## 2024-12-19 DIAGNOSIS — M54.50 CHRONIC BILATERAL LOW BACK PAIN WITHOUT SCIATICA: Primary | ICD-10-CM

## 2024-12-19 DIAGNOSIS — G89.29 CHRONIC PAIN OF MULTIPLE JOINTS: ICD-10-CM

## 2024-12-19 DIAGNOSIS — G89.29 CHRONIC BILATERAL LOW BACK PAIN WITHOUT SCIATICA: Primary | ICD-10-CM

## 2024-12-19 PROCEDURE — 97110 THERAPEUTIC EXERCISES: CPT | Mod: PN

## 2025-01-04 DIAGNOSIS — F41.1 GAD (GENERALIZED ANXIETY DISORDER): ICD-10-CM

## 2025-01-06 RX ORDER — PROPRANOLOL HYDROCHLORIDE 10 MG/1
10 TABLET ORAL 2 TIMES DAILY PRN
Qty: 60 TABLET | Refills: 2 | Status: SHIPPED | OUTPATIENT
Start: 2025-01-06 | End: 2025-04-06

## 2025-01-08 ENCOUNTER — CLINICAL SUPPORT (OUTPATIENT)
Dept: REHABILITATION | Facility: HOSPITAL | Age: 55
End: 2025-01-08
Payer: MEDICAID

## 2025-01-08 DIAGNOSIS — G89.29 CHRONIC BILATERAL LOW BACK PAIN WITHOUT SCIATICA: Primary | ICD-10-CM

## 2025-01-08 DIAGNOSIS — M54.50 CHRONIC BILATERAL LOW BACK PAIN WITHOUT SCIATICA: Primary | ICD-10-CM

## 2025-01-08 DIAGNOSIS — M25.50 CHRONIC PAIN OF MULTIPLE JOINTS: ICD-10-CM

## 2025-01-08 DIAGNOSIS — G89.29 CHRONIC PAIN OF MULTIPLE JOINTS: ICD-10-CM

## 2025-01-08 PROCEDURE — 97110 THERAPEUTIC EXERCISES: CPT | Mod: PN

## 2025-01-08 NOTE — PROGRESS NOTES
[facility]   Physical Therapy Treatment Note      Name: Rocio Arriaga  Clinic Number: [id number]    Physician: Pro Gallagher MD    Visit Date: 1/8/2025    Therapy Diagnosis:        Encounter Diagnoses   Name Primary?    Chronic pain of multiple joints      Chronic bilateral low back pain without sciatica        Physician: Pro Gallagher MD     Physician Orders: PT Eval and Treat   Medical Diagnosis from Referral: Diagnosis M25.50,G89.29 (ICD-10-CM) - Chronic pain of multiple joints M54.50,G89.29 (ICD-10-CM) - Chronic bilateral low back pain without sciatica  Evaluation Date: 11/7/2024  Authorization Period Expiration: 12/31/2024  Plan of Care Expiration: 12/19/2024  Progress Note Due: visit 4  Visit #  7/8  Visits authorized: 2/ 1   FOTO: 1/3     Precautions: Standard      Time In: 0900  Time Out: 0940  Total Appointment Time (timed & untimed codes): 40 minutes    PTA Visit #: -/5       Subjective     Patient reports: overall improvements with pain.  She was compliant with home exercise program.  Response to previous treatment:   Functional change: NA    Pain: 3/10  Location: bilateral back      Objective      Objective Measures updated at progress report unless specified.     Treatment     Rocio received the treatments listed below:      therapeutic exercises to develop strength, posture, and core stabilization for 39 minutes including:  Supine, 1x10 diaphragmatic breathing with TA activation  Supine, 3x10 hip/knee flexion/extension, feet on small red swiss ball   Supine, 2x10 lower trunk rotation  Supine, 2x10 bilaterally, small marching steps  Supine, 2x10, ball squeeze/adduction  Supine, 2x10, hip abduction, red theraband  Seated, 2x5, back stretch, rolling large red swiss ball, 10 sec hold  Seated, 1x5, B lateral roll out  Supine, 3x15 sec HSS w/ strap assist  Seated C-curve 3# rainbow 10x's each direction    supervised modalities after being cleared for contradictions:      hot pack for 15 minutes to low back, supine during supine position.      Patient Education and Home Exercises       Education provided:   - PT requested working on core strengthening at home, TA muscle engagement with purpose, during normal activity, when brushing teeth, sitting at a stop sign or a traffic light.    Written Home Exercises Provided:  pending . Exercises were reviewed and Rocio was able to demonstrate them prior to the end of the session.  Rocio demonstrated good  understanding of the education provided. See Electronic Medical Record under Patient Instructions for exercises provided during therapy sessions    Assessment     Pt tolerated today's treatment well with no increased pain or discomfort. Patient is discharged at this time secondary to meeting all goals.    Rocio Is progressing well towards her goals.   Patient prognosis is Excellent.     Patient will continue to benefit from skilled outpatient physical therapy to address the deficits listed in the problem list box on initial evaluation, provide pt/family education and to maximize pt's level of independence in the home and community environment.     Patient's spiritual, cultural and educational needs considered and pt agreeable to plan of care and goals.     Anticipated barriers to physical therapy: chronic nature    Goals:   Short Term Goals: 2 weeks   1. Patient demonstrates independence with HEP.   2. Patient demonstrates independence with Postural Awareness.   3. Patient demonstrates independence with body mechanics.   Long Term Goals: 4 weeks   1. Patient demonstrates decreased TTP to min to nil to improve tolerance to functional activities.   2. Patient demonstrates improved core stability from sit to stand to decrease overall pain.  3. Patient demonstrates improved overall function per Oswestry to 24% or less.     Plan     Plan of care Certification: 11/7/2024 to 12/19/2024.     Outpatient Physical Therapy 2 times weekly  for 4 weeks to include the following interventions: Electrical Stimulation  , Manual Therapy, Moist Heat/ Ice, Neuromuscular Re-ed, Patient Education, Therapeutic Activities, and Therapeutic Exercise.    Felisa Jimenes, PT

## 2025-01-15 ENCOUNTER — OFFICE VISIT (OUTPATIENT)
Dept: NEUROLOGY | Facility: CLINIC | Age: 55
End: 2025-01-15
Payer: MEDICAID

## 2025-01-15 VITALS
SYSTOLIC BLOOD PRESSURE: 123 MMHG | WEIGHT: 239.75 LBS | DIASTOLIC BLOOD PRESSURE: 58 MMHG | BODY MASS INDEX: 39.94 KG/M2 | HEART RATE: 78 BPM | HEIGHT: 65 IN

## 2025-01-15 DIAGNOSIS — G56.03 BILATERAL CARPAL TUNNEL SYNDROME: Primary | ICD-10-CM

## 2025-01-15 PROCEDURE — 3078F DIAST BP <80 MM HG: CPT | Mod: CPTII,,, | Performed by: STUDENT IN AN ORGANIZED HEALTH CARE EDUCATION/TRAINING PROGRAM

## 2025-01-15 PROCEDURE — 99203 OFFICE O/P NEW LOW 30 MIN: CPT | Mod: S$PBB,,, | Performed by: STUDENT IN AN ORGANIZED HEALTH CARE EDUCATION/TRAINING PROGRAM

## 2025-01-15 PROCEDURE — 99999 PR PBB SHADOW E&M-EST. PATIENT-LVL III: CPT | Mod: PBBFAC,,, | Performed by: STUDENT IN AN ORGANIZED HEALTH CARE EDUCATION/TRAINING PROGRAM

## 2025-01-15 PROCEDURE — 99213 OFFICE O/P EST LOW 20 MIN: CPT | Mod: PBBFAC | Performed by: STUDENT IN AN ORGANIZED HEALTH CARE EDUCATION/TRAINING PROGRAM

## 2025-01-15 PROCEDURE — 1159F MED LIST DOCD IN RCRD: CPT | Mod: CPTII,,, | Performed by: STUDENT IN AN ORGANIZED HEALTH CARE EDUCATION/TRAINING PROGRAM

## 2025-01-15 PROCEDURE — 3008F BODY MASS INDEX DOCD: CPT | Mod: CPTII,,, | Performed by: STUDENT IN AN ORGANIZED HEALTH CARE EDUCATION/TRAINING PROGRAM

## 2025-01-15 PROCEDURE — 3074F SYST BP LT 130 MM HG: CPT | Mod: CPTII,,, | Performed by: STUDENT IN AN ORGANIZED HEALTH CARE EDUCATION/TRAINING PROGRAM

## 2025-01-15 RX ORDER — GABAPENTIN 300 MG/1
300 CAPSULE ORAL 3 TIMES DAILY PRN
Qty: 90 CAPSULE | Refills: 11 | Status: SHIPPED | OUTPATIENT
Start: 2025-01-15 | End: 2026-01-15

## 2025-01-15 NOTE — PROGRESS NOTES
[facility]  [facility], [place]    Date: 1/15/25  Patient Name: Rocio Arriaga   MRN: [id number]   PCP: Pro Gallagher  Referring Provider: Pro Gallagher MD    Assessment:   Rocio Arriaga is a 54 y.o. female presenting for bilateral carpal tunnel syndrome. Case most consistent but not limited to bilateral carpal tunnel syndrome +/- significant baseline arthritis on her hands. Bilateral Phalen and Tinel signs on exam. History and symptoms also suggestive of this condition. Will order EMG, 2 ext. I recommended using bilateral braces, mostly at night. We will treat pain with gabapentin. Her CPS is probably due to underlying arthritis. We will plan a follow up in 3 months. She verbalized understanding and agreed with the plan.     Plan:     - EMG 2 extremities - Dr Vasquez   - Gabapentin 300 TID PRN  - Bilateral braces   - Follow up in 3 months     Problem List Items Addressed This Visit          Neuro    Bilateral carpal tunnel syndrome - Primary    Relevant Medications    gabapentin (NEURONTIN) 300 MG capsule     Jimmy Blue MD    Subjective:   Patient seen in consultation at the request of Pro Gallagher MD for the evaluation of bilateral carpal tunnel syndrome. A copy of this note will be sent to the referring physician.      HPI 1/15/25:   Ms. Rocio Arriaga is a 54 y.o. female presenting for bilateral carpal tunnel syndrome. She has a history of HTN, psoriatic arthritis (was on Humira, now on enbrel), and anxiety (propranolol, and sertraline). She stated that maybe for 7-10 years she has been dealing with hands issues. In the past 2-3 years It has gotten really bad. The symptoms are: pain > numbness and tingling of both hands. She has been diagnosed with CPS in the past. She has intermittently used braces on both wrists. 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 Sometimes the pain wakes her up at  night. She also has weakness of  since she sometimes drops things. She has no other neurological concerns at this time.     PAST MEDICAL HISTORY:  Past Medical History:   Diagnosis Date    Anxiety     Depression     Fatigue     Headache     Hypertension     Psoriasis     Psoriatic arthritis     Therapy        PAST SURGICAL HISTORY:  Past Surgical History:   Procedure Laterality Date    hypertension         CURRENT MEDS:  Current Outpatient Medications   Medication Sig Dispense Refill    clobetasoL (TEMOVATE) 0.05 % cream nightly.      clotrimazole-betamethasone 1-0.05% (LOTRISONE) cream APPLY TO AFFECTED AREA TWICE A DAY 45 g 0    etanercept (ENBREL SURECLICK) 50 mg/mL (1 mL) Inject 1 mL (50 mg total) into the skin once a week. 4 each 11    mometasone 0.1% (ELOCON) 0.1 % cream APPLY TO AFFECTED AREAS AT BEDTIME AS NEEDED FOR FLARE 45 g 2    propranoloL (INDERAL) 10 MG tablet Take 1 tablet (10 mg total) by mouth 2 (two) times daily as needed (Anxiety). 60 tablet 2    sertraline (ZOLOFT) 50 MG tablet Take 1 tablet (50 mg total) by mouth once daily. 90 tablet 0    valsartan (DIOVAN) 80 MG tablet TAKE 1 TABLET (80 MG TOTAL) BY MOUTH ONCE DAILY. 90 tablet 2    gabapentin (NEURONTIN) 300 MG capsule Take 1 capsule (300 mg total) by mouth 3 (three) times daily as needed. 90 capsule 11     No current facility-administered medications for this visit.       ALLERGIES:  Review of patient's allergies indicates:   Allergen Reactions    Gluten protein        FAMILY HISTORY:  Family History   Problem Relation Name Age of Onset    Depression Mother Kasie Arriaga     Anxiety disorder Mother Kasienaomi Arriaga     Diabetes Mother Kasienaomi Arriaga     Hypertension Mother Kasie Bart     Depression Father Tim Arriaga     Anxiety disorder Father Tim Arriaga     Diabetes Father Tim Arriaga     Vision loss Father Tim Arriaga     Stroke Sister Jovanna     Liver cancer Brother Tim         alcohol and drug induced    Drug abuse  "Brother Octaviano     Alcohol abuse Brother Octaviano     Cancer Brother Octaviano     Depression Brother Amrik     Depression Brother Lex     Anxiety disorder Brother Lex     Cancer Maternal Grandmother Mariaa     Breast cancer Neg Hx      Colon cancer Neg Hx      Ovarian cancer Neg Hx         SOCIAL HISTORY:  Social History     Tobacco Use    Smoking status: Never     Passive exposure: Never    Smokeless tobacco: Never   Substance Use Topics    Alcohol use: Not Currently     Alcohol/week: 1.0 standard drink of alcohol     Types: 1 Glasses of wine per week     Comment: Occasionally    Drug use: Never       Review of Systems:  12 system review of systems is negative except for the symptoms mentioned in HPI.      Objective:     Vitals:    01/15/25 1347   BP: (!) 123/58   BP Location: Right arm   Patient Position: Sitting   Pulse: 78   Weight: 108.7 kg (239 lb 12 oz)   Height: 5' 5" (1.651 m)     General: NAD, well nourished   Eyes: no tearing, discharge, no erythema   ENT: moist mucous membranes of the oral cavity, nares patent    Neck: Supple, full range of motion  Cardiovascular: Warm and well perfused, pulses equal and symmetrical  Lungs: Normal work of breathing, normal chest wall excursions  Skin: No rash, lesions, or breakdown on exposed skin  Psychiatry: Mood and affect are appropriate   Abdomen: soft, non tender, non distended  Extremeties: No cyanosis, clubbing or edema.    Neurological   MENTAL STATUS: Alert and oriented to person, place, and time. Attention and concentration within normal limits. Speech without dysarthria, able to name and repeat without difficulty. Recent and remote memory within normal limits   CRANIAL NERVES: Visual fields intact. PERRL. EOMI. Facial sensation intact. Face symmetrical. Hearing grossly intact. Full shoulder shrug bilaterally. Tongue protrudes midline   SENSORY: Sensation is intact to pin throughout. Bilateral Phalen and Tinel signs. Joint position perception intact. Negative " Romberg.   MOTOR: Normal bulk and tone. No pronator drift.  5/5 deltoid, biceps, triceps, interosseous, hand  bilaterally. 5/5 iliopsoas, knee extension/flexion, foot dorsi/plantarflexion bilaterally.    REFLEXES: Symmetric and 2+ throughout. Toes down going bilaterally.   CEREBELLAR/COORDINATION/GAIT: Gait steady with normal arm swing and stride length.  Heel to shin intact. Finger to nose intact. Normal rapid alternating movements.     I spent a total of 30 minutes on the day of the visit.   This includes face to face time and non-face to face time preparing to see the patient (eg, review of tests), obtaining and/or reviewing separately obtained history, documenting clinical information in the electronic or other health record, independently interpreting results and communicating results to the patient/family/caregiver, or care coordinator.      Jimmy Blue MD  Neurology

## 2025-01-29 ENCOUNTER — PATIENT MESSAGE (OUTPATIENT)
Dept: NEUROLOGY | Facility: CLINIC | Age: 55
End: 2025-01-29
Payer: MEDICAID

## 2025-01-29 DIAGNOSIS — G56.03 BILATERAL CARPAL TUNNEL SYNDROME: Primary | ICD-10-CM

## 2025-03-12 ENCOUNTER — OFFICE VISIT (OUTPATIENT)
Dept: PSYCHIATRY | Facility: CLINIC | Age: 55
End: 2025-03-12
Payer: MEDICAID

## 2025-03-12 VITALS
BODY MASS INDEX: 40.07 KG/M2 | HEIGHT: 65 IN | RESPIRATION RATE: 17 BRPM | OXYGEN SATURATION: 98 % | SYSTOLIC BLOOD PRESSURE: 114 MMHG | HEART RATE: 75 BPM | DIASTOLIC BLOOD PRESSURE: 68 MMHG | WEIGHT: 240.5 LBS

## 2025-03-12 DIAGNOSIS — F41.1 GAD (GENERALIZED ANXIETY DISORDER): Primary | ICD-10-CM

## 2025-03-12 DIAGNOSIS — F60.3 BORDERLINE PERSONALITY DISORDER IN ADULT: ICD-10-CM

## 2025-03-12 DIAGNOSIS — E66.813 CLASS 3 SEVERE OBESITY WITH BODY MASS INDEX (BMI) OF 40.0 TO 44.9 IN ADULT, UNSPECIFIED OBESITY TYPE, UNSPECIFIED WHETHER SERIOUS COMORBIDITY PRESENT: ICD-10-CM

## 2025-03-12 DIAGNOSIS — E66.01 CLASS 3 SEVERE OBESITY WITH BODY MASS INDEX (BMI) OF 40.0 TO 44.9 IN ADULT, UNSPECIFIED OBESITY TYPE, UNSPECIFIED WHETHER SERIOUS COMORBIDITY PRESENT: ICD-10-CM

## 2025-03-12 DIAGNOSIS — F33.42 RECURRENT MAJOR DEPRESSIVE DISORDER, IN FULL REMISSION: ICD-10-CM

## 2025-03-12 PROCEDURE — 99214 OFFICE O/P EST MOD 30 MIN: CPT | Mod: S$PBB,,,

## 2025-03-12 PROCEDURE — 3078F DIAST BP <80 MM HG: CPT | Mod: CPTII,,,

## 2025-03-12 PROCEDURE — 3074F SYST BP LT 130 MM HG: CPT | Mod: CPTII,,,

## 2025-03-12 PROCEDURE — 99999 PR PBB SHADOW E&M-EST. PATIENT-LVL III: CPT | Mod: PBBFAC,,,

## 2025-03-12 PROCEDURE — 4010F ACE/ARB THERAPY RXD/TAKEN: CPT | Mod: CPTII,,,

## 2025-03-12 PROCEDURE — 1160F RVW MEDS BY RX/DR IN RCRD: CPT | Mod: CPTII,,,

## 2025-03-12 PROCEDURE — 3008F BODY MASS INDEX DOCD: CPT | Mod: CPTII,,,

## 2025-03-12 PROCEDURE — 1159F MED LIST DOCD IN RCRD: CPT | Mod: CPTII,,,

## 2025-03-12 PROCEDURE — 99213 OFFICE O/P EST LOW 20 MIN: CPT | Mod: PBBFAC

## 2025-03-12 RX ORDER — SERTRALINE HYDROCHLORIDE 50 MG/1
50 TABLET, FILM COATED ORAL DAILY
Qty: 90 TABLET | Refills: 1 | Status: SHIPPED | OUTPATIENT
Start: 2025-03-12 | End: 2025-09-08

## 2025-03-12 RX ORDER — MUPIROCIN 20 MG/G
OINTMENT TOPICAL 2 TIMES DAILY PRN
COMMUNITY
Start: 2025-02-05

## 2025-03-12 NOTE — PROGRESS NOTES
"Outpatient Psychiatry Follow-Up Visit (MD/NP)    3/12/2025    Clinical Status of Patient:  Outpatient (Ambulatory)    Chief Complaint:  Rocio Arriaga is a 54 y.o. female who presents today for follow-up of depression and anxiety.  Met with patient.      Interval History and Content of Current Session:  Interim Events/Subjective Report/Content of Current Session:     This patient is known to me. She has been compliant with treatment. She denies any adverse side effects. She reports good tolerability and efficacy.     Today patient states she is "doing pretty good."  Reports she is managing her stressors well.    States propranolol and it "has helped" with her problems with anxiety.     Reports she continues to have notable problems with anxiety when she is in social situations.    Reports decreased/variable Symptoms of Depression: less diminished mood or loss of interest/anhedonia; reduced symptoms of  irritability, diminished energy, change in sleep, change in appetite, diminished concentration or cognition or indecisiveness, and excessive guilt or hopelessness or worthlessness; no PMA/R or suicidal ideations    Reports decreased/variable Symptoms of Anxiety: less excessive anxiety/worry/fear, reduced symptoms of restlessness, fatigue, poor concentration, irritability, muscle tension, and sleep disturbance;  no recurrent panic attacks; without agoraphobia    Sleep: no trouble with initiation/maintenance, no early morning awakening with inability to return to sleep, no hypersomnolence    Appetite: "same"  denies changes in appetite, + weight gain since her last visit  Weight today is 240 lbs, and was 234 lbs at previous visit  -She is still working on her diet and trying to do better at controlling her diet and weight (mostly unsuccessful at this time)    No new psychological stressors have occurred since her last appointment.     Her family is stable and supportive.     No new medical issue has occurred since her " last appointment.   - chronic Psoriasis    -She has had no ER visits since her last appointment    Reports mood remains stable (no diminished or elevation in mood; normal interest; no expansiveness; no irritability).    Reports concentration and energy remain normal.     Denies suicidal/homicidal ideations. No psychosis noted.    Denied Substance Use: no intoxication, withdrawal, tolerance, used in larger amounts or duration than intended, unsuccessful attempts to limit or quit, increased time engaging in or seeking out, cravings or strong desire to use, failure to fulfill obligations, negative consequences in social/interpersonal/occupational,/recreational areas, use in dangerous situations, or medical or psychological consequences   -no evidence of misuse/abuse or dependence     We discussed medications as below; the patient is in agreement to treatment as documented below.    Review of Systems   Psychiatric: Pertinent items are noted in this encounter narrative   Constitutional: + weight gain  Musculoskeletal: Denies pain or stiffness in joints   Neurological: No weakness, sensory changes, seizures, confusion, memory loss, tremor, or other abnormal   Endocrine: Denies polydipsia or polyuria   Integumentary: No rashes or lacerations   Eyes: No exophthalmos, jaundice, or blindness   ENT: Denies dizziness, tinnitus or hearing loss    Respiratory: Denies shortness of breath   Cardiovascular: No chest pain or tachycardia   Gastrointestinal: Denies nausea, vomiting, diarrhea, constipation or pain  Genitourinary: Denies dysuria, frequency, or sexual dysfunction   Hematologic/Lymphatic: Denies excessive bleeding, prolonged or excessive bleeding after invasive procedures, dental extraction or injury   Allergic/Immunologic: Denies allergic response to foods, animals or other materials at this time      Past Medical, Family and Social History: The patient's past medical, family and social history have been reviewed and  "updated as appropriate within the electronic medical record - see encounter notes.    Compliance: yes    Side effects: None    Risk Parameters:  Patient reports no suicidal ideation  Patient reports no homicidal ideation  Patient reports no self-injurious behavior  Patient reports no violent behavior    Exam (detailed: at least 9 elements; comprehensive: all 15 elements)   Constitutional  Vitals:  Most recent vital signs, dated less than 90 days prior to this appointment, were reviewed.   Vitals:    03/12/25 0930   BP: 114/68   Pulse: 75   Resp: 17   SpO2: 98%   Weight: 109.1 kg (240 lb 8 oz)   Height: 5' 5" (1.651 m)      General:  unremarkable, age appropriate, well nourished, casually dressed, neatly groomed, obese     Musculoskeletal  Muscle Strength/Tone:  no spasicity, no rigidity, no cogwheeling, no flaccidity, no paratonia, no dyskinesia, no dystonia, no tremor, no tic, no choreoathetosis, no atrophy   Gait & Station:  non-ataxic     Psychiatric  Speech:  no latency; no press, spontaneous   Mood & Affect:  euthymic  congruent and appropriate   Thought Process:  normal and logical   Associations:  intact   Thought Content:  normal, no suicidality, no homicidality, delusions, or paranoia   Insight:  intact, has awareness of illness   Judgement: behavior is adequate to circumstances, age appropriate   Orientation:  grossly intact   Memory: intact for content of interview, grossly intact   Language: grossly intact   Attention Span & Concentration:  able to focus   Fund of Knowledge:  intact and appropriate to age and level of education, familiar with aspects of current personal life     Assessment and Diagnosis   Status/Progress: Based on the examination today, the patient's problem(s) is/are improved and well controlled.  New problems have not been presented today.   Co-morbidities are complicating management of the primary condition.  There are no active rule-out diagnoses for this patient at this time. "     General Impression:       ICD-10-CM ICD-9-CM   1. BRIJESH (generalized anxiety disorder)  F41.1 300.02   2. Borderline personality disorder in adult  F60.3 301.83   3. Recurrent major depressive disorder, in full remission  F33.42 296.36   4. Class 3 severe obesity with body mass index (BMI) of 40.0 to 44.9 in adult, unspecified obesity type, unspecified whether serious comorbidity present  E66.813 278.01    E66.01 V85.41    Z68.41      Intervention/Counseling/Treatment Plan   Medication Management: The risks and benefits of medication were discussed with the patient.  Labs, Diagnostic Studies: reviewed lab work from 11/26/2024 with patient   Counseling provided with patient as follows: importance of compliance with chosen treatment options was emphasized, risks and benefits of treatment options, including medications, were discussed with the patient, patient education, instructions for  management, treatment, and follow-up were reviewed      Plan  BRIJESH  Continue Zoloft 50 mg daily  Continue Propranolol 10MG BID PRN anxiety  Consider psychotherapy  Pt. counseled  MDD  Continue Zoloft 50 mg daily  Consider psychotherapy  Pt. Counseled    BPD  Continue Zoloft 50 mg daily  Consider psychotherapy  Pt. Counseled    Obesity  Pt counseled   Not been scheduled to see dietitian yet, nutrition referral ordered on previous visit  Encouraged patient to increase activity and decrease caloric intake  Avoid medications with high potential for excessive weight gain      Therapy   Consider psychotherapy    Medical stressors:  Patient counseled, continue medications/treatment as scheduled f/u with providers as scheduled     Goals:   Develop effective coping skills to manage anxiety and depression  Develop a bedtime routine     Antidepressant/Antianxiety Medication Continued: Patient informed of risks, benefits, and potential side effects of medication and accepts informed consent. Common side effects include nausea, fatigue, headache,  insomnia., Specifically discussed the possibility of new or worsening suicidal thoughts/depression. Patient instructed to stop the medication immediately and seek urgent treatment if this occurs. Patient instructed not to abruptly discontinue medication without physician guidance except in cases of sudden onset or worsening of SI.     Discussed with patient informed consent, risks vs. benefits, alternative treatments, side effect profile and the inherent unpredictability of individual responses to these treatments. The patient expresses understanding of the above and displays the capacity to agree with this current plan and had no other questions. The patient also agrees that currently, the benefits outweigh the risks and would like to pursue treatment at this time   Encouraged Patient to keep future appointments.  Take medications as prescribed and abstain from substance abuse.    Safety plan reviewed with patient for worsening condition or suicidal ideations. In the event of an emergency patient was advised to go to the emergency room.  Patient verbalizes understanding.    The patient was seen and examined. Her chart was reviewed. Reviewed notes by Chary Finnegan NP from 11/27/2024 at 8:00 AM, Dillan Mckay MD from 11/29/2024, and Ana Salinas MD from 12/3/2024 at 1:45 PM.    Approximately 38 minutes of total time spent on this encounter, which includes face to face time, and non-face to face time preparing to see the patient (eg, review of labs/tests), obtaining and or reviewing separately obtained history, documenting clinical information in the electronic or other health record, independently interpreting results (not separately reported) and communicating results to the patient/family/caregiver or care coordination (not separately reported).                                                    Return to Clinic: 6 months or sooner if needed

## 2025-03-18 ENCOUNTER — OFFICE VISIT (OUTPATIENT)
Dept: FAMILY MEDICINE | Facility: CLINIC | Age: 55
End: 2025-03-18
Payer: MEDICAID

## 2025-03-18 VITALS
HEIGHT: 65 IN | HEART RATE: 80 BPM | WEIGHT: 238.31 LBS | SYSTOLIC BLOOD PRESSURE: 118 MMHG | OXYGEN SATURATION: 99 % | DIASTOLIC BLOOD PRESSURE: 74 MMHG | RESPIRATION RATE: 16 BRPM | BODY MASS INDEX: 39.71 KG/M2

## 2025-03-18 DIAGNOSIS — I10 ESSENTIAL HYPERTENSION: Primary | ICD-10-CM

## 2025-03-18 DIAGNOSIS — M54.50 CHRONIC BILATERAL LOW BACK PAIN WITHOUT SCIATICA: ICD-10-CM

## 2025-03-18 DIAGNOSIS — G89.29 CHRONIC BILATERAL LOW BACK PAIN WITHOUT SCIATICA: ICD-10-CM

## 2025-03-18 DIAGNOSIS — L40.50 PSORIATIC ARTHRITIS: ICD-10-CM

## 2025-03-18 DIAGNOSIS — K80.50 BILIARY COLIC: ICD-10-CM

## 2025-03-18 DIAGNOSIS — I10 PRIMARY HYPERTENSION: ICD-10-CM

## 2025-03-18 PROCEDURE — 99999 PR PBB SHADOW E&M-EST. PATIENT-LVL III: CPT | Mod: PBBFAC,,, | Performed by: STUDENT IN AN ORGANIZED HEALTH CARE EDUCATION/TRAINING PROGRAM

## 2025-03-18 PROCEDURE — 4010F ACE/ARB THERAPY RXD/TAKEN: CPT | Mod: CPTII,,, | Performed by: STUDENT IN AN ORGANIZED HEALTH CARE EDUCATION/TRAINING PROGRAM

## 2025-03-18 PROCEDURE — 99214 OFFICE O/P EST MOD 30 MIN: CPT | Mod: S$PBB,,, | Performed by: STUDENT IN AN ORGANIZED HEALTH CARE EDUCATION/TRAINING PROGRAM

## 2025-03-18 PROCEDURE — 3078F DIAST BP <80 MM HG: CPT | Mod: CPTII,,, | Performed by: STUDENT IN AN ORGANIZED HEALTH CARE EDUCATION/TRAINING PROGRAM

## 2025-03-18 PROCEDURE — 99213 OFFICE O/P EST LOW 20 MIN: CPT | Mod: PBBFAC | Performed by: STUDENT IN AN ORGANIZED HEALTH CARE EDUCATION/TRAINING PROGRAM

## 2025-03-18 PROCEDURE — 3074F SYST BP LT 130 MM HG: CPT | Mod: CPTII,,, | Performed by: STUDENT IN AN ORGANIZED HEALTH CARE EDUCATION/TRAINING PROGRAM

## 2025-03-18 PROCEDURE — 1159F MED LIST DOCD IN RCRD: CPT | Mod: CPTII,,, | Performed by: STUDENT IN AN ORGANIZED HEALTH CARE EDUCATION/TRAINING PROGRAM

## 2025-03-18 PROCEDURE — 3008F BODY MASS INDEX DOCD: CPT | Mod: CPTII,,, | Performed by: STUDENT IN AN ORGANIZED HEALTH CARE EDUCATION/TRAINING PROGRAM

## 2025-03-18 PROCEDURE — G2211 COMPLEX E/M VISIT ADD ON: HCPCS | Mod: S$PBB,,, | Performed by: STUDENT IN AN ORGANIZED HEALTH CARE EDUCATION/TRAINING PROGRAM

## 2025-03-18 RX ORDER — VALSARTAN 80 MG/1
80 TABLET ORAL DAILY
Qty: 90 TABLET | Refills: 1 | Status: SHIPPED | OUTPATIENT
Start: 2025-03-18 | End: 2025-09-14

## 2025-03-18 NOTE — PROGRESS NOTES
Subjective:       Patient ID: Rocio Arriaga is a 54 y.o. female.    Chief Complaint: Follow-up (Patient is here today for a 6 month follow up visit. )    Patient report the chronic back pain remained the same. It is bilateral and intermittent. Worse with prolonged standing and certain activity. Pain is achy. She did PT which helped a little. Still with pain most days. Pain worse with prolonged standing.     Gallbladder: US on her gallbladder in ER last year Nov., want to do another work up. Currently symptom: constant belching. HIDA ordered in December 2024 but never scheduled.    Psoriasis of bilateral feet: causing occasion bleeding, pain, affecting her walking. On etanercept and mometasone cream.    HTN: on valsartan 80mg daily.    BRIJESH/MDD: she is followed by psych. On zoloft and propranolol    She is following with neurology for bilateral CTS    Review of Systems   Constitutional:  Positive for fatigue. Negative for chills and fever.   HENT:  Negative for congestion and sore throat.    Respiratory:  Negative for chest tightness and wheezing.    Cardiovascular:  Negative for chest pain.   Gastrointestinal:  Negative for nausea and vomiting.   Genitourinary:  Negative for dysuria and hematuria.   Musculoskeletal:  Positive for arthralgias, back pain and myalgias.   Neurological:  Negative for dizziness and syncope.   Psychiatric/Behavioral:  Negative for sleep disturbance.        Objective:      Physical Exam  Vitals reviewed.   Constitutional:       General: She is not in acute distress.  HENT:      Head: Normocephalic and atraumatic.      Mouth/Throat:      Mouth: Mucous membranes are moist.      Pharynx: Oropharynx is clear.   Eyes:      Conjunctiva/sclera: Conjunctivae normal.   Cardiovascular:      Rate and Rhythm: Normal rate and regular rhythm.      Heart sounds: Normal heart sounds. No murmur heard.  Pulmonary:      Effort: Pulmonary effort is normal. No respiratory distress.      Breath sounds: Normal  breath sounds.   Abdominal:      General: Abdomen is flat.      Palpations: Abdomen is soft.   Musculoskeletal:         General: Tenderness present.      Comments: No paraspinal TTP  Negative SLR   Skin:     General: Skin is warm and dry.      Findings: Erythema and rash present.   Neurological:      General: No focal deficit present.      Mental Status: She is alert and oriented to person, place, and time.      Motor: No weakness.   Psychiatric:         Mood and Affect: Mood normal.         Behavior: Behavior normal.         Assessment:       1. Essential hypertension    2. Psoriatic arthritis    3. Chronic bilateral low back pain without sciatica    4. Biliary colic    5. Primary hypertension          Plan:           1. Essential hypertension  -     Lipid Panel; Future; Expected date: 03/18/2025    2. Psoriatic arthritis    3. Chronic bilateral low back pain without sciatica  -     Ambulatory referral/consult to Pain Clinic; Future; Expected date: 03/25/2025    4. Biliary colic    5. Primary hypertension  -     valsartan (DIOVAN) 80 MG tablet; Take 1 tablet (80 mg total) by mouth once daily.  Dispense: 90 tablet; Refill: 1    Cont current meds  Recent labs reviewed, check lipids  Cont home PT exercises for chronic back pain. Cont gabapentin  Refer pain management  Schedule HIDA  Follow-up rhuem as scheduled  RTC for worsening symptoms or failure to improve, or RTC PRN/as scheduled

## 2025-03-19 ENCOUNTER — TELEPHONE (OUTPATIENT)
Dept: PAIN MEDICINE | Facility: CLINIC | Age: 55
End: 2025-03-19
Payer: MEDICAID

## 2025-03-21 ENCOUNTER — PATIENT MESSAGE (OUTPATIENT)
Dept: FAMILY MEDICINE | Facility: CLINIC | Age: 55
End: 2025-03-21
Payer: MEDICAID

## 2025-03-21 ENCOUNTER — PATIENT MESSAGE (OUTPATIENT)
Dept: PSYCHIATRY | Facility: CLINIC | Age: 55
End: 2025-03-21
Payer: MEDICAID

## 2025-03-25 ENCOUNTER — PROCEDURE VISIT (OUTPATIENT)
Dept: NEUROLOGY | Facility: CLINIC | Age: 55
End: 2025-03-25
Payer: MEDICAID

## 2025-03-25 DIAGNOSIS — G56.01 RIGHT CARPAL TUNNEL SYNDROME: ICD-10-CM

## 2025-03-25 PROCEDURE — 95911 NRV CNDJ TEST 9-10 STUDIES: CPT | Mod: PBBFAC | Performed by: PSYCHIATRY & NEUROLOGY

## 2025-03-25 PROCEDURE — 95911 NRV CNDJ TEST 9-10 STUDIES: CPT | Mod: 26,S$PBB,, | Performed by: PSYCHIATRY & NEUROLOGY

## 2025-03-25 PROCEDURE — 95886 MUSC TEST DONE W/N TEST COMP: CPT | Mod: PBBFAC | Performed by: PSYCHIATRY & NEUROLOGY

## 2025-03-25 PROCEDURE — 95886 MUSC TEST DONE W/N TEST COMP: CPT | Mod: 26,S$PBB,, | Performed by: PSYCHIATRY & NEUROLOGY

## 2025-03-25 NOTE — PROCEDURES
REPORT OF EMG and NERVE CONDUCTION STUDY    Name: Rocio Arriaga  Date of Study:  3/25/2025  Referring Physician:  Dr. Gallagher  Test Performed by:  MD Christina  Full Values Attached  Informed Consent Scanned.   No anesthesia used.   Amount of Blood Loss: none. The patient tolerated this procedure well.       Informed consent was obtained prior to performing this study. Two patient identifiers were confirmed with the patient prior to performing this study. A time out to determine correct patient and and agreement on procedure performed was conducted prior to the concentric needle examination.    Reason for the study:  painful and numb hands      Findings:   Nerve conduction studies of the bilateral median (motor and sensory)nerves, bilateral ulnar (motor and sensory) nerves, and bilateral radial sensory nerves were performed. Right median palm to wrist latency was mildly prolonged to 3.5ms.   Otherwise, amplitudes, distal latencies, conduction velocities, and F-waves were normal.   EMG of selected muscles of the bilateral arms were performed as indicated on the attached sheets.  Insertional activity was normal without fasciculation or fibrillation, normal sized and phasia of motor units.      Impression:  Abnormal Study secondary to the Presence of:      Mild Right Carpal Tunnel Syndrome      Elmer Vasquez M.D. [facility].     A copy of this EMG/NCS report will be sent to Dr Gallagher . Thanks for sending this patient for EMG/NCS. The patient plans to await further recommendations from you regarding the above findings.

## 2025-04-01 ENCOUNTER — OFFICE VISIT (OUTPATIENT)
Dept: PAIN MEDICINE | Facility: CLINIC | Age: 55
End: 2025-04-01
Payer: MEDICAID

## 2025-04-01 VITALS
SYSTOLIC BLOOD PRESSURE: 112 MMHG | BODY MASS INDEX: 39.65 KG/M2 | HEIGHT: 65 IN | WEIGHT: 238 LBS | HEART RATE: 93 BPM | DIASTOLIC BLOOD PRESSURE: 69 MMHG | OXYGEN SATURATION: 98 %

## 2025-04-01 DIAGNOSIS — M51.360 DEGENERATION OF INTERVERTEBRAL DISC OF LUMBAR REGION WITH DISCOGENIC BACK PAIN: ICD-10-CM

## 2025-04-01 DIAGNOSIS — M54.9 DORSALGIA, UNSPECIFIED: ICD-10-CM

## 2025-04-01 DIAGNOSIS — M47.816 LUMBAR FACET ARTHROPATHY: ICD-10-CM

## 2025-04-01 DIAGNOSIS — M43.16 ANTEROLISTHESIS OF LUMBAR SPINE: ICD-10-CM

## 2025-04-01 DIAGNOSIS — M54.50 CHRONIC BILATERAL LOW BACK PAIN WITHOUT SCIATICA: Primary | ICD-10-CM

## 2025-04-01 DIAGNOSIS — G89.29 CHRONIC BILATERAL LOW BACK PAIN WITHOUT SCIATICA: Primary | ICD-10-CM

## 2025-04-01 PROCEDURE — 99214 OFFICE O/P EST MOD 30 MIN: CPT | Mod: PBBFAC | Performed by: ANESTHESIOLOGY

## 2025-04-01 PROCEDURE — 4010F ACE/ARB THERAPY RXD/TAKEN: CPT | Mod: CPTII,,, | Performed by: ANESTHESIOLOGY

## 2025-04-01 PROCEDURE — 1160F RVW MEDS BY RX/DR IN RCRD: CPT | Mod: CPTII,,, | Performed by: ANESTHESIOLOGY

## 2025-04-01 PROCEDURE — 99204 OFFICE O/P NEW MOD 45 MIN: CPT | Mod: S$PBB,,, | Performed by: ANESTHESIOLOGY

## 2025-04-01 PROCEDURE — 1159F MED LIST DOCD IN RCRD: CPT | Mod: CPTII,,, | Performed by: ANESTHESIOLOGY

## 2025-04-01 PROCEDURE — 3008F BODY MASS INDEX DOCD: CPT | Mod: CPTII,,, | Performed by: ANESTHESIOLOGY

## 2025-04-01 PROCEDURE — 3074F SYST BP LT 130 MM HG: CPT | Mod: CPTII,,, | Performed by: ANESTHESIOLOGY

## 2025-04-01 PROCEDURE — 3078F DIAST BP <80 MM HG: CPT | Mod: CPTII,,, | Performed by: ANESTHESIOLOGY

## 2025-04-01 PROCEDURE — 99999 PR PBB SHADOW E&M-EST. PATIENT-LVL IV: CPT | Mod: PBBFAC,,, | Performed by: ANESTHESIOLOGY

## 2025-04-01 NOTE — PROGRESS NOTES
New Patient Evaluation  [facility]    Rocio Arriaga  : 1970  Date: 2025     CHIEF COMPLAINT:  Low-back Pain, Mid-back Pain, and Hip Pain    Referring Physician: Self, Aaareferral  Primary Care Physician: Pro Gallagher MD    HPI:  This is a 54 y.o. female with a chief complaint of Low-back Pain, Mid-back Pain, and Hip Pain  . The patient has Past medical history/Past surgical history of  bilateral carpal tunnel syndrome, hypertension, obesity, psoriatic arthritis, immunosuppressive on etanercept     Patient was evaluated and referred by  primary care provider for low back pain that mainly axial in nature    Diabetic: No    Anticoagulation medications: None    Allergy To Iodine: No    Currently on Antibiotic: No    Pain Disability Index Review:      2025     1:13 PM   Last 3 PDI Scores   Pain Disability Index (PDI) 32       Current Description of Pain Symptoms:    History of Recent Fall or Trauma: No   Onset: Chronic, started on going  Pain Location: mid-lower back  Radiates/associated symptoms: very intermittent BL LE to the knee, not typical radiculitis   Pain is Getting worse over the last 3 months   The pain is intermittent.   The pain is described as aching and numbing.   Exacerbating factors: Sitting, Standing, Bending, and Lifting.   Mitigating factors medication.   Symptoms interfere with daily activity, sleeping.   The patient feels like symptoms have been worsening.   Patient denies night fever/night sweats, urinary incontinence, bowel incontinence, significant weight loss, significant motor weakness, and loss of sensations.    Pain score:   Current: 0/10  Best: 0/10  Worst: 6/10    Current pain medications:  Gabapentin 300mg, TID PRN    Current Narcotics/Opioid /benzo Medications:  Opioids- None  Benzodiazepines: No    UDS:  NA    PDMP:  Reviewed and consistent with medication use as prescribed.     Previous Chronic Pain Treatment History:  Six weeks of  conservative therapy include: Physical Therapy/HEP/Physician Lead Exercise Program:  Over the past 12 months, Patient has done  8 sessions.  PT response: Mildly Helpful.   Dates of the PT sessions: 11/2024.  Is patient actively participating in home exercise program (HEP)/ physician led exercise program in the last 6 months: Yes 11/2024 till low     Non-interventional Pain Therapy:  []Chiropractor.   [x]Dry needle.  []TENS unit.  [x]Heat/ICE.  []Back Brace.    Medications previously tried:  NSAIDs: Ibuprofen (Advil/Motrin) and Meloxicam (Mobic)  Topical Agent: No  TCA/SSRI/SNRI: SSRI: Sertraline (Zoloft)  Anti-convulsants: Gabapentin   Muscle Relaxants: None  Opioids- Hydrocodone with Acetaminophen (Norco).    Interventional Pain Procedures:  N/A    Previous spine/Relevant joint surgery:  N/A  Surgical History:   has a past surgical history that includes hypertension.  Medical History:   has a past medical history of Anxiety, Depression, Fatigue, Headache, Hypertension, Psoriasis, Psoriatic arthritis, and Therapy.  Family History:  family history includes Alcohol abuse in her brother; Anxiety disorder in her brother, father, and mother; Cancer in her brother and maternal grandmother; Depression in her brother, brother, father, and mother; Diabetes in her father and mother; Drug abuse in her brother; Hypertension in her mother; Liver cancer in her brother; Stroke in her sister; Vision loss in her father.  Allergies:  Gluten protein   Social History/SUBSTANCE ABUSE HISTORY:  Personal history of substance abuse: No   reports that she has never smoked. She has never been exposed to tobacco smoke. She has never used smokeless tobacco. She reports that she does not currently use alcohol after a past usage of about 1.0 standard drink of alcohol per week. She reports that she does not use drugs.  LABS:  CBC  Lab Results   Component Value Date    WBC 7.19 11/29/2024    HGB 12.8 11/29/2024    HCT 37.6 11/29/2024  "    Coagulation Profile   Lab Results   Component Value Date     11/29/2024     No results found for: "PT", "PTT", "INR"  CMP:  BMP  Lab Results   Component Value Date     11/29/2024    K 4.1 11/29/2024     11/29/2024    CO2 23 11/29/2024    BUN 16 11/29/2024    CREATININE 0.9 11/29/2024    CALCIUM 9.2 11/29/2024    ANIONGAP 9 11/29/2024    EGFRNORACEVR >60 11/29/2024     Lab Results   Component Value Date    ALT 29 11/29/2024    AST 23 11/29/2024    ALKPHOS 79 11/29/2024    BILITOT 0.6 11/29/2024     HGBA1C:  Lab Results   Component Value Date    HGBA1C 5.3 08/15/2023       ROS:    Review of Systems   GENERAL:  No weight loss, malaise or fevers.  HEENT:   No recent changes in vision or hearing  NECK:  Negative for lumps, no difficulty with swallowing.  RESPIRATORY:  Negative for cough, wheezing or shortness of breath, patient denies any recent URI.  CARDIOVASCULAR:  Negative for chest pain or palpitations.  GI:  Negative for abdominal discomfort, blood in stools or black stools or change in bowel habits.  MUSCULOSKELETAL:  See HPI.  SKIN:  Negative for lesions, rash, and itching.  PSYCH:  No mood disorder or recent psychosocial stressors.   HEMATOLOGY/LYMPHOLOGY:  See the blood thinner sectioned in HPI.  NEURO:  See HPI  All other reviewed and negative other than HPI.    PHYSICAL EXAM:  VITALS: /69 (BP Location: Left arm, Patient Position: Sitting)   Pulse 93   Ht 5' 5" (1.651 m)   Wt 108 kg (238 lb)   LMP 11/10/2021 (Approximate)   SpO2 98%   BMI 39.61 kg/m²   Body mass index is 39.61 kg/m².  GENERAL: Well appearing, in no acute distress, alert and oriented x3, answers questions appropriately.   PSYCH: Flat affect.  SKIN: Skin color, texture, turgor normal, no rashes or lesions.  HEAD/FACE:  Normocephalic, atraumatic. Cranial nerves grossly intact.  CV: Regular rate  PULM: No evidence of respiratory difficulty, symmetric chest rise.  GI:  Soft and " non-Distended.    BACK/SIJ/HIP:  Lumbar Spine Exam:       Inspection: No erythema, bruising.       Palpation: (+++) TTP of lumbar paraspinals bilaterally      ROM:  Limited in flexion, extension, lateral bending.       (+++) Facet loading bilateral      (Negative) Straight Leg Raise, bilateral      (Negative) RACHID, Tenderness over the PSIS, Yeoman test, bilateral  Hip Exam:      Inspection: No gross deformity or apparent leg length discrepancy      Palpation:  No TTP to bilateral greater trochanteric bursas.       ROM:  no limitation Due to pain in internal rotation, external rotation b/l  Neurologic Exam:     Alert. Speech is fluent and appropriate.     Strength: 5/5 in bilateral hip flexion and knee extension     Sensation:  Grossly intact to light touch in bilateral lower extremities     Tone: No abnormality appreciated in bilateral lower extremities    GAIT:  normal gait    DIAGNOSTIC STUDIES AND MEDICAL RECORDS REVIEW:  I have personally reviewed and interpreted relevant radiology reports and reviewed relevant records from other services in the EMR.   -  x-ray lumbar spine 10/2024  lignment: There is anterolisthesis of L5 in respect S1 associated with bilateral spondylolysis.  There is some abnormal movement with flexion imaging suggesting underlying ligamentous instability.     Vertebrae: Vertebral body heights are maintained.  No suspicious appearing lytic or blastic lesions.     Discs and facets: There is disc space narrowing with endplate sclerosis most pronounced at L5-S1 with marginal osteophytosis.  Facet arthropathy at L4-5 and L5-S1.       Clinical Impression:  This is a pleasant 54 y.o. female patient with PMH/PSH of bilateral carpal tunnel syndrome, hypertension, obesity, psoriatic arthritis, immunosuppressive on etanercept , presenting with  low back pain, mainly axial in nature with the intermittent radiation to bilateral lower extremity above the knee to the hip region, she completed multiple  sessions of physical therapy last year in November without significant relief, her x-ray lumbar spine showed significant anterolisthesis of L5 in respect to S1 in addition to facet arthropathy at L4-L5 and L5-S1, I would recommend obtaining MRI lumbar spine and continue multimodal pain medication including gabapentin and over-the-counter pain medication.    Encounter Diagnosis:  Rocio Arriaga is a 54 y.o. female with the following diagnoses based on history, exam, and imagin. Chronic bilateral low back pain without sciatica  - Ambulatory referral/consult to Pain Clinic    2. Dorsalgia, unspecified  - MRI Lumbar Spine Without Contrast; Future    3. Anterolisthesis of lumbar spine    4. Lumbar facet arthropathy    5. Degeneration of intervertebral disc of lumbar region with discogenic back pain     Treatment Plan:    Diagnostics/Referrals: MRI lumbar spine    Medications:    NSAIDs: OTC  Topical Agent: No  TCA/SSRI/SNRI: None  Anti-convulsants: Gabapentin   Muscle Relaxants: None  Opioids: None    Interventional Therapy: Procedure based on MRI images.  Sedation: NA.  Anticoagulation medications: None -Clearance to stop Blood thinner: NA    Regarding the above interventions, the patient has been educated regarding the risks (including bleeding, infection, increased pain, nerve damage, or allergic reaction), benefits, and alternatives. The patient states she understands and is eager to proceed.    Physical Rehabilitation: Physician led exercise program and home exercise    Patient Education: Counseled patient regarding the importance of activity modification, I have stressed the importance of physical activity and a home exercise plan to help with pain and improve health.    Follow-up: RTC  to discuss MRI lumbar spine.    May consider: JOHN Packer MD  Anesthesiologist  Interventional Pain Medicine  2025    Disclaimer:  This note was prepared using voice recognition system and is likely to have sound  alike errors that may have been overlooked even after proof reading.  Please call me with any questions.

## 2025-04-01 NOTE — PROGRESS NOTES
New Patient Evaluation  [facility]    Rocio Arriaga  : 1970  Date: 2025     CHIEF COMPLAINT:  No chief complaint on file.    Referring Physician: Self, Aaareferral  Primary Care Physician: Pro Gallagher MD    HPI:  This is a 54 y.o. female with a chief complaint of No chief complaint on file.  . The patient has Past medical history/Past surgical history of  bilateral carpal tunnel syndrome, hypertension, obesity, psoriatic arthritis, immunosuppressive on Humira    Patient was evaluated and referred by ***    Diabetic: {GAYes/No/NA:91684}    {Anticoagulation medications:22045}    Allergy To Iodine: {GAYes/No/NA:92103}    Currently on Antibiotic: {GAYes/No/NA:39286}    Pain Disability Index Review:       No data to display                Current Description of Pain Symptoms:    History of Recent Fall or Trauma: {GAYes/No/NA:27169}   Onset: Chronic, started ***  Pain Location: ***  Radiates/associated symptoms: ***.   Pain is Getting worse over the last *** months   The pain is {Intermittent/Continuous:50740}.   The pain is described as {Desc; pain character:64092}.   Exacerbating factors: {Causes; Pain:57520}.   Mitigating factors ***.   Symptoms interfere with daily activity, sleeping, and ***.   The patient feels like symptoms have been {IUW:14746}.   Patient {Denies / Reports:72636} {RED FLAGS:76562}.    Pain score:   Current: {PAIN 0-10:71144}/10  Best: {PAIN 0-10:64318}/10  Worst: {PAIN 0-10:41686}/10    Current pain medications:  ***    Current Narcotics/Opioid /benzo Medications:  Opioids- {GAopioid:02373}  Benzodiazepines: {GAYes/No/NA:46416}    UDS:  NA    PDMP:  {:12166}     Previous Chronic Pain Treatment History:  Six weeks of conservative therapy include: Physical Therapy/HEP/Physician Lead Exercise Program:  Over the past 12 months, Patient has done  *** sessions.  PT response: {PT response:87624} Helpful.   Dates of the PT sessions: ***, ***.  Is patient  "actively participating in home exercise program (HEP)/ physician led exercise program in the last 6 months: {GAYes/No/NA:05580}.    Non-interventional Pain Therapy:  []Chiropractor.   []Acupuncture/Dry needle.  []TENS unit.  []Heat/ICE.  []Back Brace.    Medications previously tried:  NSAIDs: {GANSIAD:96524}  Topical Agent: {GAYes/No/NA:74194}  TCA/SSRI/SNRI: {GATCA/SSRI/SNRI:46920}  Anti-convulsants: {GAAnticonvulsants:60765}  Muscle Relaxants: {GAmuscle Relaxant:41456}  Opioids- {GAopioid:57731}.    Interventional Pain Procedures:  ***    Previous spine/Relevant joint surgery:  ***  Surgical History:   has a past surgical history that includes hypertension.  Medical History:   has a past medical history of Anxiety, Depression, Fatigue, Headache, Hypertension, Psoriasis, Psoriatic arthritis, and Therapy.  Family History:  family history includes Alcohol abuse in her brother; Anxiety disorder in her brother, father, and mother; Cancer in her brother and maternal grandmother; Depression in her brother, brother, father, and mother; Diabetes in her father and mother; Drug abuse in her brother; Hypertension in her mother; Liver cancer in her brother; Stroke in her sister; Vision loss in her father.  Allergies:  Gluten protein   Social History/SUBSTANCE ABUSE HISTORY:  Personal history of substance abuse: No   reports that she has never smoked. She has never been exposed to tobacco smoke. She has never used smokeless tobacco. She reports that she does not currently use alcohol after a past usage of about 1.0 standard drink of alcohol per week. She reports that she does not use drugs.  LABS:  CBC  Lab Results   Component Value Date    WBC 7.19 11/29/2024    HGB 12.8 11/29/2024    HCT 37.6 11/29/2024     Coagulation Profile   Lab Results   Component Value Date     11/29/2024     No results found for: "PT", "PTT", "INR"  CMP:  BMP  Lab Results   Component Value Date     11/29/2024    K 4.1 11/29/2024     " 11/29/2024    CO2 23 11/29/2024    BUN 16 11/29/2024    CREATININE 0.9 11/29/2024    CALCIUM 9.2 11/29/2024    ANIONGAP 9 11/29/2024    EGFRNORACEVR >60 11/29/2024     Lab Results   Component Value Date    ALT 29 11/29/2024    AST 23 11/29/2024    ALKPHOS 79 11/29/2024    BILITOT 0.6 11/29/2024     HGBA1C:  Lab Results   Component Value Date    HGBA1C 5.3 08/15/2023       ROS:    Review of Systems   GENERAL:  No weight loss, malaise or fevers.  HEENT:   No recent changes in vision or hearing  NECK:  Negative for lumps, no difficulty with swallowing.  RESPIRATORY:  Negative for cough, wheezing or shortness of breath, patient denies any recent URI.  CARDIOVASCULAR:  Negative for chest pain or palpitations.  GI:  Negative for abdominal discomfort, blood in stools or black stools or change in bowel habits.  MUSCULOSKELETAL:  See HPI.  SKIN:  Negative for lesions, rash, and itching.  PSYCH:  No mood disorder or recent psychosocial stressors.   HEMATOLOGY/LYMPHOLOGY:  See the blood thinner sectioned in HPI.  NEURO:  See HPI  All other reviewed and negative other than HPI.    PHYSICAL EXAM:  VITALS: LMP 11/10/2021 (Approximate)   There is no height or weight on file to calculate BMI.  GENERAL: Well appearing, in no acute distress, alert and oriented x3, answers questions appropriately.   PSYCH: Flat affect.  SKIN: Skin color, texture, turgor normal, no rashes or lesions.  HEAD/FACE:  Normocephalic, atraumatic. Cranial nerves grossly intact.  CV: Regular rate  PULM: No evidence of respiratory difficulty, symmetric chest rise.  GI:  Soft and non-Distended.  NECK: ({GA+:20997}) pain to palpation over the cervical paraspinous muscles. Spurling:{GA+:29649}. ({GA+:35394}) pain with neck flexion, extension, or lateral flexion, Muscle strength in RT UE ***/5 and Left UE ***/5, Right Hand  ***/5, Left Hand ***/5  BACK/SIJ/HIP:  Lumbar Spine Exam:       Inspection: No erythema, bruising.       Palpation: ({GA+:73895}) TTP of  lumbar paraspinals bilaterally      ROM:  Limited in flexion, extension, lateral bending.       ({GA+:48783}) Facet loading {GAHip:27498}      ({GA+:37292}) Straight Leg Raise, {GAHip:80536}      ({GA+:04809}) RACHID, Tenderness over the PSIS, Yeoman test, {GAHip:72519}  Hip Exam:      Inspection: No gross deformity or apparent leg length discrepancy      Palpation:  No TTP to bilateral greater trochanteric bursas.       ROM:  *** limitation Due to pain in internal rotation, external rotation b/l  Neurologic Exam:     Alert. Speech is fluent and appropriate.     Strength: ***/5 in {GAHip:32699} hip flexion and knee extension     Sensation:  Grossly intact to light touch in bilateral lower extremities     Tone: No abnormality appreciated in bilateral lower extremities  Knee exam:  No gross deformity or apparent leg length discrepancy, positive tenderness over the anteromedial aspect of the knee cap, positive limitation due to pain in flexion and extension, sensation  grossly intact to light touch in bilateral lower extremity, no atrophy or tone abnormalities    GAIT: {GAgait:37691}    DIAGNOSTIC STUDIES AND MEDICAL RECORDS REVIEW:  I have personally reviewed and interpreted relevant radiology reports and reviewed relevant records from other services in the EMR.   ***  Clinical Impression:  This is a pleasant 54 y.o. female patient with PMH/PSH of ***, presenting with***.     We discussed the underlying diagnoses and multiple treatment options including non-opioid medications, interventional procedures, physical therapy, home exercise, core muscle enhancement, and weight loss.  The risks and benefits of each treatment option were discussed and all questions were answered.      Encounter Diagnosis:  Rocio Arriaga is a 54 y.o. female with the following diagnoses based on history, exam, and imaging:  There are no diagnoses linked to this encounter.     Treatment Plan:    Diagnostics/Referrals:  "{gaimage:02150}    Medications:    NSAIDs: {GANSIAD:18613}  Topical Agent: {GAYes/No/NA:68898}  TCA/SSRI/SNRI: {GATCA/SSRI/SNRI:37770}  Anti-convulsants: {GAAnticonvulsants:39538}  Muscle Relaxants: {GAmuscle Relaxant:37368}  Opioids: {GAopioid:71533::"None"}  Patient was educated about the risk and benefit of chronic opioid therapy including dependency, addiction, diversion, and opioid hyperalgesia.  Interventional Therapy: {GAProcedure:07426}.  Sedation: {GAsedation:24956}.  {Anticoagulation medications:07322} -Clearance to stop Blood thinner: {GAYes/No/NA:59351}    Regarding the above interventions, the patient has been educated regarding the risks (including bleeding, infection, increased pain, nerve damage, or allergic reaction), benefits, and alternatives. The patient states she understands and is eager to proceed.    Physical Rehabilitation: {GAPT:48721}    Patient Education: Counseled patient regarding the importance of {:64267}, I have stressed the importance of physical activity and a home exercise plan to help with pain and improve health.    Follow-up: RTC ***.    May consider:     I would like to thank Abhishek Cameronrefmojgan for the opportunity to assist in the care of this patient. We had a very nice visit and I look forward to continuing their care. Please let me know if I can be of further assistance.     Carleen Packer MD  Anesthesiologist  Interventional Pain Medicine  04/01/2025    Disclaimer:  This note was prepared using voice recognition system and is likely to have sound alike errors that may have been overlooked even after proof reading.  Please call me with any questions.    "

## 2025-04-05 DIAGNOSIS — F41.1 GAD (GENERALIZED ANXIETY DISORDER): ICD-10-CM

## 2025-04-07 RX ORDER — PROPRANOLOL HYDROCHLORIDE 10 MG/1
10 TABLET ORAL 2 TIMES DAILY PRN
Qty: 180 TABLET | Refills: 0 | Status: SHIPPED | OUTPATIENT
Start: 2025-04-07 | End: 2025-07-06

## 2025-04-14 ENCOUNTER — HOSPITAL ENCOUNTER (OUTPATIENT)
Dept: RADIOLOGY | Facility: HOSPITAL | Age: 55
Discharge: HOME | End: 2025-04-14
Attending: ANESTHESIOLOGY
Payer: MEDICAID

## 2025-04-14 ENCOUNTER — PATIENT MESSAGE (OUTPATIENT)
Dept: PAIN MEDICINE | Facility: CLINIC | Age: 55
End: 2025-04-14
Payer: MEDICAID

## 2025-04-14 DIAGNOSIS — M54.9 DORSALGIA, UNSPECIFIED: ICD-10-CM

## 2025-04-14 PROCEDURE — 72148 MRI LUMBAR SPINE W/O DYE: CPT | Mod: 26,,, | Performed by: RADIOLOGY

## 2025-04-14 PROCEDURE — 72148 MRI LUMBAR SPINE W/O DYE: CPT | Mod: TC

## 2025-04-15 ENCOUNTER — OFFICE VISIT (OUTPATIENT)
Dept: PAIN MEDICINE | Facility: CLINIC | Age: 55
End: 2025-04-15
Payer: MEDICAID

## 2025-04-15 DIAGNOSIS — M43.16 ANTEROLISTHESIS OF LUMBAR SPINE: Primary | ICD-10-CM

## 2025-04-15 DIAGNOSIS — M54.51 VERTEBROGENIC LOW BACK PAIN: ICD-10-CM

## 2025-04-15 DIAGNOSIS — M51.362 DEGENERATION OF INTERVERTEBRAL DISC OF LUMBAR REGION WITH DISCOGENIC BACK PAIN AND LOWER EXTREMITY PAIN: ICD-10-CM

## 2025-04-15 DIAGNOSIS — M43.06 SPONDYLOLYSIS OF LUMBAR REGION: ICD-10-CM

## 2025-04-15 PROCEDURE — 4010F ACE/ARB THERAPY RXD/TAKEN: CPT | Mod: CPTII,95,, | Performed by: ANESTHESIOLOGY

## 2025-04-15 PROCEDURE — 1159F MED LIST DOCD IN RCRD: CPT | Mod: CPTII,95,, | Performed by: ANESTHESIOLOGY

## 2025-04-15 PROCEDURE — 98006 SYNCH AUDIO-VIDEO EST MOD 30: CPT | Mod: 95,,, | Performed by: ANESTHESIOLOGY

## 2025-04-15 PROCEDURE — 1160F RVW MEDS BY RX/DR IN RCRD: CPT | Mod: CPTII,95,, | Performed by: ANESTHESIOLOGY

## 2025-04-15 NOTE — PROGRESS NOTES
EST Patient Evaluation  [facility]    Rocio Arriaga  : 1970  Date: 4/15/2025     CHIEF COMPLAINT:  Low-back Pain    Referring Physician: Self, Aaareferral  Primary Care Physician: Pro Gallagher MD    HPI:  This is a 54 y.o. female with a chief complaint of Low-back Pain  . The patient has Past medical history/Past surgical history of  bilateral carpal tunnel syndrome, hypertension, obesity, psoriatic arthritis, immunosuppressive on etanercept     Patient was evaluated and referred by  primary care provider for low back pain that mainly axial in nature    Interval History 04/15/2025:  Rocio Arriaga is here for follow up visit, to discuss MRI lumbar spine that showed L5 anterolisthesis, L5 spondylolysis, severe degenerative disc disease at L4-L5 and L5-S1 Modic type 1 and type 3 at L5-S1, impingement upon the bilateral exiting L5 nerve root at L5-S1  Current pain score: 3-4/10    Diabetic: No    Anticoagulation medications: None    Allergy To Iodine: No    Currently on Antibiotic: No    Pain Disability Index Review:      2025     1:13 PM   Last 3 PDI Scores   Pain Disability Index (PDI) 32     Current Description of Pain Symptoms:    History of Recent Fall or Trauma: No   Onset: Chronic, started on going  Pain Location: mid-lower back  Radiates/associated symptoms: BL LE to the knee  Pain is Getting worse over the last 3 months   The pain is intermittent.   The pain is described as aching and numbing.   Exacerbating factors: Sitting, Standing, Bending, and Lifting.   Mitigating factors medication.   Symptoms interfere with daily activity, sleeping.   The patient feels like symptoms have been worsening.   Patient denies night fever/night sweats, urinary incontinence, bowel incontinence, significant weight loss, significant motor weakness, and loss of sensations.    Pain score:   Best: 0/10  Worst: 6/10    Current pain medications:  Gabapentin 300mg, TID PRN    Current  Narcotics/Opioid /benzo Medications:  Opioids- None  Benzodiazepines: No    UDS:  NA    PDMP:  Reviewed and consistent with medication use as prescribed.     Previous Chronic Pain Treatment History:  Six weeks of conservative therapy include: Physical Therapy/HEP/Physician Lead Exercise Program:  Over the past 12 months, Patient has done  8 sessions.  PT response: Mildly Helpful.   Dates of the PT sessions: 11/2024.  Is patient actively participating in home exercise program (HEP)/ physician led exercise program in the last 6 months: Yes 11/2024 till low     Non-interventional Pain Therapy:  []Chiropractor.   [x]Dry needle.  []TENS unit.  [x]Heat/ICE.  []Back Brace.    Medications previously tried:  NSAIDs: Ibuprofen (Advil/Motrin) and Meloxicam (Mobic)  Topical Agent: No  TCA/SSRI/SNRI: SSRI: Sertraline (Zoloft)  Anti-convulsants: Gabapentin   Muscle Relaxants: None  Opioids- Hydrocodone with Acetaminophen (Norco).    Interventional Pain Procedures:  N/A    Previous spine/Relevant joint surgery:  N/A  Surgical History:   has a past surgical history that includes hypertension.  Medical History:   has a past medical history of Anxiety, Depression, Fatigue, Headache, Hypertension, Psoriasis, Psoriatic arthritis, and Therapy.  Family History:  family history includes Alcohol abuse in her brother; Anxiety disorder in her brother, father, and mother; Cancer in her brother and maternal grandmother; Depression in her brother, brother, father, and mother; Diabetes in her father and mother; Drug abuse in her brother; Hypertension in her mother; Liver cancer in her brother; Stroke in her sister; Vision loss in her father.  Allergies:  Gluten protein   Social History/SUBSTANCE ABUSE HISTORY:  Personal history of substance abuse: No   reports that she has never smoked. She has never been exposed to tobacco smoke. She has never used smokeless tobacco. She reports that she does not currently use alcohol after a past usage of  "about 1.0 standard drink of alcohol per week. She reports that she does not use drugs.  LABS:  CBC  Lab Results   Component Value Date    WBC 7.19 11/29/2024    HGB 12.8 11/29/2024    HCT 37.6 11/29/2024     Coagulation Profile   Lab Results   Component Value Date     11/29/2024     No results found for: "PT", "PTT", "INR"  CMP:  BMP  Lab Results   Component Value Date     11/29/2024    K 4.1 11/29/2024     11/29/2024    CO2 23 11/29/2024    BUN 16 11/29/2024    CREATININE 0.9 11/29/2024    CALCIUM 9.2 11/29/2024    ANIONGAP 9 11/29/2024    EGFRNORACEVR >60 11/29/2024     Lab Results   Component Value Date    ALT 29 11/29/2024    AST 23 11/29/2024    ALKPHOS 79 11/29/2024    BILITOT 0.6 11/29/2024     HGBA1C:  Lab Results   Component Value Date    HGBA1C 5.3 08/15/2023       ROS:    Review of Systems   GENERAL:  No weight loss, malaise or fevers.  HEENT:   No recent changes in vision or hearing  NECK:  Negative for lumps, no difficulty with swallowing.  RESPIRATORY:  Negative for cough, wheezing or shortness of breath, patient denies any recent URI.  CARDIOVASCULAR:  Negative for chest pain or palpitations.  GI:  Negative for abdominal discomfort, blood in stools or black stools or change in bowel habits.  MUSCULOSKELETAL:  See HPI.  SKIN:  Negative for lesions, rash, and itching.  PSYCH:  No mood disorder or recent psychosocial stressors.   HEMATOLOGY/LYMPHOLOGY:  See the blood thinner sectioned in HPI.  NEURO:  See HPI  All other reviewed and negative other than HPI.    Previous PHYSICAL EXAM:  VITALS: LMP 11/10/2021 (Approximate)   There is no height or weight on file to calculate BMI.  GENERAL: Well appearing, in no acute distress, alert and oriented x3, answers questions appropriately.   PSYCH: Flat affect.  SKIN: Skin color, texture, turgor normal, no rashes or lesions.  HEAD/FACE:  Normocephalic, atraumatic. Cranial nerves grossly intact.  CV: Regular rate  PULM: No evidence of respiratory " difficulty, symmetric chest rise.  GI:  Soft and non-Distended.    BACK/SIJ/HIP:  Lumbar Spine Exam:       Inspection: No erythema, bruising.       Palpation: (+++) TTP of lumbar paraspinals bilaterally      ROM:  Limited in flexion, extension, lateral bending.       (+++) Facet loading bilateral      (Negative) Straight Leg Raise, bilateral      (Negative) RACHID, Tenderness over the PSIS, Yeoman test, bilateral  Hip Exam:      Inspection: No gross deformity or apparent leg length discrepancy      Palpation:  No TTP to bilateral greater trochanteric bursas.       ROM:  no limitation Due to pain in internal rotation, external rotation b/l  Neurologic Exam:     Alert. Speech is fluent and appropriate.     Strength: 5/5 in bilateral hip flexion and knee extension     Sensation:  Grossly intact to light touch in bilateral lower extremities     Tone: No abnormality appreciated in bilateral lower extremities    GAIT:  normal gait    DIAGNOSTIC STUDIES AND MEDICAL RECORDS REVIEW:  I have personally reviewed and interpreted relevant radiology reports and reviewed relevant records from other services in the EMR.   -  x-ray lumbar spine 10/2024  lignment: There is anterolisthesis of L5 in respect S1 associated with bilateral spondylolysis.  There is some abnormal movement with flexion imaging suggesting underlying ligamentous instability.     Vertebrae: Vertebral body heights are maintained.  No suspicious appearing lytic or blastic lesions.     Discs and facets: There is disc space narrowing with endplate sclerosis most pronounced at L5-S1 with marginal osteophytosis.  Facet arthropathy at L4-5 and L5-S1.      -  MRI lumbar spine 04/2025  The incidentally visualized soft tissue structures of the abdomen have a normal appearance.     There is anterolisthesis of L5 in respect S1 by approximately 9 mm with suspected bilateral L5 spondylolysis.  Vertebral body heights are maintained.  There is severe disc desiccation with disc  space narrowing at L5-S1 with mild disc desiccation at L4-5.  Combination of Modic type 1 and type 3 changes at L5-S1 the marrow signal is otherwise normal without evidence for a marrow replacement process, infection or tumor.  Conus terminates at T12-L1.     At T12-L1, no disc herniation, central canal stenosis or neural foraminal narrowing.     At L1-2, no disc herniation, central canal stenosis or neural foraminal narrowing.     At L2-3, minimal facet arthropathy.  No disc herniation, central canal stenosis or neural foraminal narrowing.     At L3-4, bilateral facet arthropathy.  No disc herniation, central canal stenosis or neural foraminal narrowing.     At L4-5, circumferential disc bulge extending into the neural foramina with superimposed central disc protrusion with annular fissure and facet arthropathy contributing to mild bilateral neural foraminal narrowing, worse on the right.     At L5-S1, there is anterolisthesis with uncovering of the endplate.  There is a broad-based posterior disc bulge that extends into both neural foramina with osseous spurring in the neural foramina and facet arthropathy.  There is deformity of the bilateral neural foramina with impingement upon the bilateral exiting L5 nerve roots, more so on the right.     Impression:     Degenerative changes of the lumbar spine, most pronounced at the L4-5 and L5-S1 level.  Of note, there is bilateral L5 spondylolysis with anterolisthesis of L5 in respect S1 by approximately 9 mm.  Please see above report for level by level description.       Clinical Impression:  This is a pleasant 54 y.o. female patient with PMH/PSH of bilateral carpal tunnel syndrome, hypertension, obesity, psoriatic arthritis, immunosuppressive on etanercept , presenting with  low back pain, mainly axial in nature with the intermittent radiation to bilateral lower extremity above the knee to the hip region, she completed multiple sessions of physical therapy last year in  November without significant relief, her x-ray lumbar spine showed significant anterolisthesis of L5 in respect to S1 in addition to facet arthropathy at L4-L5 and L5-S1, MRI lumbar spine tshowed L5 anterolisthesis, L5 spondylolysis, severe degenerative disc disease at L4-L5 and L5-S1 Modic type 1 and type 3 at L5-S1, impingement upon the bilateral exiting L5 nerve root at L5-S1.  Encounter Diagnosis:  Rocio Arriaga is a 54 y.o. female with the following diagnoses based on history, exam, and imagin. Anterolisthesis of lumbar spine  - Ambulatory Referral/Consult to Physical Therapy; Future    2. Spondylolysis of lumbar region    3. Vertebrogenic low back pain    4. Degeneration of intervertebral disc of lumbar region with discogenic back pain and lower extremity pain      Treatment Plan:    Diagnostics/Referrals:  physical therapy referral  Medications:    NSAIDs: OTC  Topical Agent: No  TCA/SSRI/SNRI: None  Anti-convulsants: Gabapentin   Muscle Relaxants: None  Opioids: None    Interventional Therapy: may consider bilateral L5 transforaminal epidural steroid injection upon her request  if she feels no relief from physical therapy  Sedation:  IV sedation  Anticoagulation medications: None -Clearance to stop Blood thinner: NA    Regarding the above interventions, the patient has been educated regarding the risks (including bleeding, infection, increased pain, nerve damage, or allergic reaction), benefits, and alternatives. The patient states she understands and is eager to proceed.    Physical Rehabilitation: Physician led exercise program and home exercise    Patient Education: Counseled patient regarding the importance of activity modification, I have stressed the importance of physical activity and a home exercise plan to help with pain and improve health.    Follow-up: RTC as needed    May consider: AUDREY LOPEZ referral     Carleen Packer MD  Anesthesiologist  Interventional Pain  Medicine  04/15/2025    Disclaimer:  This note was prepared using voice recognition system and is likely to have sound alike errors that may have been overlooked even after proof reading.  Please call me with any questions.

## 2025-04-17 ENCOUNTER — RESULTS FOLLOW-UP (OUTPATIENT)
Dept: FAMILY MEDICINE | Facility: CLINIC | Age: 55
End: 2025-04-17

## 2025-04-17 ENCOUNTER — HOSPITAL ENCOUNTER (OUTPATIENT)
Dept: RADIOLOGY | Facility: HOSPITAL | Age: 55
Discharge: HOME | End: 2025-04-17
Attending: STUDENT IN AN ORGANIZED HEALTH CARE EDUCATION/TRAINING PROGRAM
Payer: MEDICAID

## 2025-04-17 VITALS — BODY MASS INDEX: 38.27 KG/M2 | WEIGHT: 230 LBS

## 2025-04-17 DIAGNOSIS — K80.50 BILIARY COLIC: ICD-10-CM

## 2025-04-17 PROCEDURE — 78227 HEPATOBIL SYST IMAGE W/DRUG: CPT | Mod: TC

## 2025-04-17 PROCEDURE — A9537 TC99M MEBROFENIN: HCPCS | Performed by: STUDENT IN AN ORGANIZED HEALTH CARE EDUCATION/TRAINING PROGRAM

## 2025-04-17 PROCEDURE — 78227 HEPATOBIL SYST IMAGE W/DRUG: CPT | Mod: 26,,, | Performed by: RADIOLOGY

## 2025-04-17 RX ORDER — SINCALIDE 5 UG/5ML
0.02 INJECTION, POWDER, LYOPHILIZED, FOR SOLUTION INTRAVENOUS ONCE
Status: DISCONTINUED | OUTPATIENT
Start: 2025-04-17 | End: 2025-04-18 | Stop reason: HOSPADM

## 2025-04-17 RX ORDER — KIT FOR THE PREPARATION OF TECHNETIUM TC 99M MEBROFENIN 45 MG/10ML
5.2 INJECTION, POWDER, LYOPHILIZED, FOR SOLUTION INTRAVENOUS
Status: COMPLETED | OUTPATIENT
Start: 2025-04-17 | End: 2025-04-17

## 2025-04-17 RX ADMIN — MEBROFENIN 5.2 MILLICURIE: 45 INJECTION, POWDER, LYOPHILIZED, FOR SOLUTION INTRAVENOUS at 10:04

## 2025-04-23 ENCOUNTER — PATIENT OUTREACH (OUTPATIENT)
Dept: ADMINISTRATIVE | Facility: HOSPITAL | Age: 55
End: 2025-04-23
Payer: MEDICAID

## 2025-04-23 NOTE — PROGRESS NOTES
Cervical Cancer Screening gap report  Portal active: active  Chart reviewed, immunization record updated.  No new results noted on [facility] or [facility] web site.  Care Everywhere updated.   Digital Medicine Hypertension Program Eligibility: N/A  Patient care coordination note  LOV with PCP 3/18/2025  Contact patient, discussed Cervical Cancer Screening, patient declined screening. Discussed the importance of screening, patient verbalized understanding and continued to decline to schedule.    Cologuard up to date, next due 9/23/2027  MMG up to date, next due 8/19/2025

## 2025-04-29 ENCOUNTER — LAB VISIT (OUTPATIENT)
Dept: LAB | Facility: HOSPITAL | Age: 55
End: 2025-04-29
Attending: INTERNAL MEDICINE
Payer: MEDICAID

## 2025-04-29 DIAGNOSIS — R76.8 POSITIVE ANA (ANTINUCLEAR ANTIBODY): ICD-10-CM

## 2025-04-29 LAB
ABSOLUTE EOSINOPHIL (OHS): 0.17 K/UL
ABSOLUTE MONOCYTE (OHS): 0.67 K/UL (ref 0.3–1)
ABSOLUTE NEUTROPHIL COUNT (OHS): 3.57 K/UL (ref 1.8–7.7)
ALBUMIN SERPL BCP-MCNC: 4 G/DL (ref 3.5–5.2)
ALP SERPL-CCNC: 88 UNIT/L (ref 40–150)
ALT SERPL W/O P-5'-P-CCNC: 28 UNIT/L (ref 10–44)
ANION GAP (OHS): 8 MMOL/L (ref 8–16)
AST SERPL-CCNC: 25 UNIT/L (ref 11–45)
BASOPHILS # BLD AUTO: 0.07 K/UL
BASOPHILS NFR BLD AUTO: 0.9 %
BILIRUB SERPL-MCNC: 0.3 MG/DL (ref 0.1–1)
BUN SERPL-MCNC: 14 MG/DL (ref 6–20)
CALCIUM SERPL-MCNC: 8.8 MG/DL (ref 8.7–10.5)
CHLORIDE SERPL-SCNC: 106 MMOL/L (ref 95–110)
CO2 SERPL-SCNC: 25 MMOL/L (ref 23–29)
CREAT SERPL-MCNC: 0.9 MG/DL (ref 0.5–1.4)
ERYTHROCYTE [DISTWIDTH] IN BLOOD BY AUTOMATED COUNT: 13.7 % (ref 11.5–14.5)
ERYTHROCYTE [SEDIMENTATION RATE] IN BLOOD BY PHOTOMETRIC METHOD: 26 MM/HR
GFR SERPLBLD CREATININE-BSD FMLA CKD-EPI: >60 ML/MIN/1.73/M2
GLUCOSE SERPL-MCNC: 99 MG/DL (ref 70–110)
HCT VFR BLD AUTO: 38.7 % (ref 37–48.5)
HGB BLD-MCNC: 12.9 GM/DL (ref 12–16)
IMM GRANULOCYTES # BLD AUTO: 0.09 K/UL (ref 0–0.04)
IMM GRANULOCYTES NFR BLD AUTO: 1.2 % (ref 0–0.5)
LYMPHOCYTES # BLD AUTO: 2.8 K/UL (ref 1–4.8)
MCH RBC QN AUTO: 29.3 PG (ref 27–31)
MCHC RBC AUTO-ENTMCNC: 33.3 G/DL (ref 32–36)
MCV RBC AUTO: 88 FL (ref 82–98)
NUCLEATED RBC (/100WBC) (OHS): 0 /100 WBC
PLATELET # BLD AUTO: 351 K/UL (ref 150–450)
PMV BLD AUTO: 9.8 FL (ref 9.2–12.9)
POTASSIUM SERPL-SCNC: 4.2 MMOL/L (ref 3.5–5.1)
PROT SERPL-MCNC: 8 GM/DL (ref 6–8.4)
RBC # BLD AUTO: 4.41 M/UL (ref 4–5.4)
RELATIVE EOSINOPHIL (OHS): 2.3 %
RELATIVE LYMPHOCYTE (OHS): 38 % (ref 18–48)
RELATIVE MONOCYTE (OHS): 9.1 % (ref 4–15)
RELATIVE NEUTROPHIL (OHS): 48.5 % (ref 38–73)
SODIUM SERPL-SCNC: 139 MMOL/L (ref 136–145)
WBC # BLD AUTO: 7.37 K/UL (ref 3.9–12.7)

## 2025-04-29 PROCEDURE — 86225 DNA ANTIBODY NATIVE: CPT

## 2025-04-29 PROCEDURE — 86140 C-REACTIVE PROTEIN: CPT

## 2025-04-29 PROCEDURE — 86160 COMPLEMENT ANTIGEN: CPT | Mod: 59

## 2025-04-29 PROCEDURE — 36415 COLL VENOUS BLD VENIPUNCTURE: CPT

## 2025-04-29 PROCEDURE — 80053 COMPREHEN METABOLIC PANEL: CPT

## 2025-04-29 PROCEDURE — 85025 COMPLETE CBC W/AUTO DIFF WBC: CPT

## 2025-04-29 PROCEDURE — 85652 RBC SED RATE AUTOMATED: CPT

## 2025-04-29 PROCEDURE — 86160 COMPLEMENT ANTIGEN: CPT

## 2025-04-29 NOTE — PROGRESS NOTES
[facility] - [facility]OLOG[facility]  [facility], [place]    Date: 4/30/25  Patient Name: Rocio Arriaga   MRN: [id number]   PCP: Pro Gallagher  Referring Provider: No ref. provider found    Assessment:   Rocio Arriaga is a 54 y.o. female presenting for bilateral carpal tunnel syndrome. EMG showed carpal tunnel of the R hand. I wonder if most of her symptoms are arthritic in nature then. We will continue wearing the braces, and taking gabapentin PRN. She can follow up with me PRN. She verbalized understanding and agreed with the plan    Plan:     - Gabapentin 300 TID PRN  - Bilateral braces   - Follow up PRN    Problem List Items Addressed This Visit          Neuro    Bilateral carpal tunnel syndrome - Primary     Jimmy Blue MD    Subjective:   Patient seen in consultation at the request of No ref. provider found for the evaluation of bilateral carpal tunnel syndrome.. A copy of this note will be sent to the referring physician.      Interval history 4/29/25:   Ms. Rocio Arriaga is a 54 y.o. female presenting for bilateral carpal tunnel syndrome. Since last seen, EMG was done confirming mild R carpal tunnel syndrome. She has been using the wrist brace and the gabapentin which helps with her R hand symptoms but not with there L side symptoms. She thinks her symptoms are mostly arthritis then. She will see rheumatology next week.     Per previous by myself on 1/15/25:    Assessment:   Rocio Arriaga is a 54 y.o. female presenting for bilateral carpal tunnel syndrome. Case most consistent but not limited to bilateral carpal tunnel syndrome +/- significant baseline arthritis on her hands. Bilateral Phalen and Tinel signs on exam. History and symptoms also suggestive of this condition. Will order EMG, 2 ext. I recommended using bilateral braces, mostly at night. We will treat pain with gabapentin. Her CPS is probably due to underlying arthritis. We will plan a follow up in 3 months. She  "verbalized understanding and agreed with the plan.      Plan:      - EMG 2 extremities - Dr Vasquez   - Gabapentin 300 TID PRN  - Bilateral braces   - Follow up in 3 months     PAST MEDICAL HISTORY:  Past Medical History:   Diagnosis Date    Anxiety     Depression     Fatigue     Headache     Hypertension     Psoriasis     Psoriatic arthritis     Therapy        PAST SURGICAL HISTORY:  Past Surgical History:   Procedure Laterality Date    hypertension         CURRENT MEDS:  Current Medications[1]    ALLERGIES:  Review of patient's allergies indicates:   Allergen Reactions    Gluten protein        FAMILY HISTORY:  Family History   Problem Relation Name Age of Onset    Depression Mother Kasie Bart     Anxiety disorder Mother Kasie Bart     Diabetes Mother Kasienaomi Arriaga     Hypertension Mother Kasienaomi Arriaga     Depression Father iTm Arriaga     Anxiety disorder Father Tim Arriaga     Diabetes Father Tim Arriaga     Vision loss Father Tim Arriaga     Stroke Sister oJvanna     Liver cancer Brother Tim         alcohol and drug induced    Drug abuse Brother Octaviano     Alcohol abuse Brother Octaviano     Cancer Brother Octaviano     Depression Brother Amrik     Depression Brother Lex     Anxiety disorder Brother Lex     Cancer Maternal Grandmother Mariaa     Breast cancer Neg Hx      Colon cancer Neg Hx      Ovarian cancer Neg Hx         SOCIAL HISTORY:  Social History[2]    Review of Systems:  12 system review of systems is negative except for the symptoms mentioned in HPI.      Objective:     Vitals:    04/30/25 1435   BP: (!) 114/59   BP Location: Left arm   Patient Position: Sitting   Pulse: 75   Weight: 108.7 kg (239 lb 8.5 oz)   Height: 5' 5" (1.651 m)     General: NAD, well nourished   Eyes: no tearing, discharge, no erythema   ENT: moist mucous membranes of the oral cavity, nares patent    Neck: Supple, full range of motion  Cardiovascular: Warm and well perfused, pulses equal and " symmetrical  Lungs: Normal work of breathing, normal chest wall excursions  Skin: No rash, lesions, or breakdown on exposed skin  Psychiatry: Mood and affect are appropriate   Abdomen: soft, non tender, non distended  Extremeties: No cyanosis, clubbing or edema.    Neurological   MENTAL STATUS: Alert and oriented to person, place, and time. Attention and concentration within normal limits. Speech without dysarthria, able to name and repeat without difficulty. Recent and remote memory within normal limits   CRANIAL NERVES: Visual fields intact. PERRL. EOMI. Facial sensation intact. Face symmetrical. Hearing grossly intact. Full shoulder shrug bilaterally. Tongue protrudes midline   SENSORY: Sensation is intact to pin throughout. Bilateral Phalen and Tinel signs. Joint position perception intact. Negative Romberg.   MOTOR: Normal bulk and tone. No pronator drift.  5/5 deltoid, biceps, triceps, interosseous, hand  bilaterally. 5/5 iliopsoas, knee extension/flexion, foot dorsi/plantarflexion bilaterally.    REFLEXES: Symmetric and 2+ throughout. Toes down going bilaterally.   CEREBELLAR/COORDINATION/GAIT: Gait steady with normal arm swing and stride length.  Heel to shin intact. Finger to nose intact. Normal rapid alternating movements.    I spent a total of 30 minutes on the day of the visit.   This includes face to face time and non-face to face time preparing to see the patient (eg, review of tests), obtaining and/or reviewing separately obtained history, documenting clinical information in the electronic or other health record, independently interpreting results and communicating results to the patient/family/caregiver, or care coordinator.      Jimmy Blue MD  Neurology           [1]   Current Outpatient Medications   Medication Sig Dispense Refill    clobetasoL (TEMOVATE) 0.05 % cream nightly.      clotrimazole-betamethasone 1-0.05% (LOTRISONE) cream APPLY TO AFFECTED AREA TWICE A DAY 45 g 0    etanercept  (ENBREL SURECLICK) 50 mg/mL (1 mL) Inject 1 mL (50 mg total) into the skin once a week. 4 each 11    gabapentin (NEURONTIN) 300 MG capsule Take 1 capsule (300 mg total) by mouth 3 (three) times daily as needed. 90 capsule 11    mometasone 0.1% (ELOCON) 0.1 % cream APPLY TO AFFECTED AREAS AT BEDTIME AS NEEDED FOR FLARE 45 g 2    mupirocin (BACTROBAN) 2 % ointment Apply topically 2 (two) times daily as needed.      propranoloL (INDERAL) 10 MG tablet Take 1 tablet (10 mg total) by mouth 2 (two) times daily as needed (Anxiety). 180 tablet 0    sertraline (ZOLOFT) 50 MG tablet Take 1 tablet (50 mg total) by mouth once daily. 90 tablet 1    valsartan (DIOVAN) 80 MG tablet Take 1 tablet (80 mg total) by mouth once daily. 90 tablet 1     No current facility-administered medications for this visit.   [2]   Social History  Tobacco Use    Smoking status: Never     Passive exposure: Never    Smokeless tobacco: Never   Substance Use Topics    Alcohol use: Not Currently     Alcohol/week: 1.0 standard drink of alcohol     Types: 1 Glasses of wine per week     Comment: Occasionally    Drug use: Never

## 2025-04-30 ENCOUNTER — OFFICE VISIT (OUTPATIENT)
Dept: NEUROLOGY | Facility: CLINIC | Age: 55
End: 2025-04-30
Payer: MEDICAID

## 2025-04-30 VITALS
BODY MASS INDEX: 39.9 KG/M2 | HEIGHT: 65 IN | DIASTOLIC BLOOD PRESSURE: 59 MMHG | HEART RATE: 75 BPM | WEIGHT: 239.5 LBS | SYSTOLIC BLOOD PRESSURE: 114 MMHG

## 2025-04-30 DIAGNOSIS — G56.03 BILATERAL CARPAL TUNNEL SYNDROME: Primary | ICD-10-CM

## 2025-04-30 LAB
C3 SERPL-MCNC: 163 MG/DL (ref 50–180)
C4 COMPLEMENT (OHS): 25 MG/DL (ref 11–44)
CRP SERPL-MCNC: 20.1 MG/L
DSDNA ANTIBODY (OHS): NORMAL
DSDNA ANTIBODY TITER (OHS): NORMAL

## 2025-04-30 PROCEDURE — 1159F MED LIST DOCD IN RCRD: CPT | Mod: CPTII,,, | Performed by: STUDENT IN AN ORGANIZED HEALTH CARE EDUCATION/TRAINING PROGRAM

## 2025-04-30 PROCEDURE — 3074F SYST BP LT 130 MM HG: CPT | Mod: CPTII,,, | Performed by: STUDENT IN AN ORGANIZED HEALTH CARE EDUCATION/TRAINING PROGRAM

## 2025-04-30 PROCEDURE — 3078F DIAST BP <80 MM HG: CPT | Mod: CPTII,,, | Performed by: STUDENT IN AN ORGANIZED HEALTH CARE EDUCATION/TRAINING PROGRAM

## 2025-04-30 PROCEDURE — 3008F BODY MASS INDEX DOCD: CPT | Mod: CPTII,,, | Performed by: STUDENT IN AN ORGANIZED HEALTH CARE EDUCATION/TRAINING PROGRAM

## 2025-04-30 PROCEDURE — 99214 OFFICE O/P EST MOD 30 MIN: CPT | Mod: S$PBB,,, | Performed by: STUDENT IN AN ORGANIZED HEALTH CARE EDUCATION/TRAINING PROGRAM

## 2025-04-30 PROCEDURE — 99213 OFFICE O/P EST LOW 20 MIN: CPT | Mod: PBBFAC | Performed by: STUDENT IN AN ORGANIZED HEALTH CARE EDUCATION/TRAINING PROGRAM

## 2025-04-30 PROCEDURE — 99999 PR PBB SHADOW E&M-EST. PATIENT-LVL III: CPT | Mod: PBBFAC,,, | Performed by: STUDENT IN AN ORGANIZED HEALTH CARE EDUCATION/TRAINING PROGRAM

## 2025-04-30 PROCEDURE — 4010F ACE/ARB THERAPY RXD/TAKEN: CPT | Mod: CPTII,,, | Performed by: STUDENT IN AN ORGANIZED HEALTH CARE EDUCATION/TRAINING PROGRAM

## 2025-05-07 ENCOUNTER — CLINICAL SUPPORT (OUTPATIENT)
Dept: REHABILITATION | Facility: HOSPITAL | Age: 55
End: 2025-05-07
Attending: ANESTHESIOLOGY
Payer: MEDICAID

## 2025-05-07 DIAGNOSIS — M43.16 ANTEROLISTHESIS OF LUMBAR SPINE: ICD-10-CM

## 2025-05-07 DIAGNOSIS — M54.50 CHRONIC BILATERAL LOW BACK PAIN WITHOUT SCIATICA: Primary | ICD-10-CM

## 2025-05-07 DIAGNOSIS — G89.29 CHRONIC BILATERAL LOW BACK PAIN WITHOUT SCIATICA: Primary | ICD-10-CM

## 2025-05-07 PROCEDURE — 97161 PT EVAL LOW COMPLEX 20 MIN: CPT | Mod: PN

## 2025-05-07 PROCEDURE — 97110 THERAPEUTIC EXERCISES: CPT | Mod: PN

## 2025-05-08 NOTE — PROGRESS NOTES
Outpatient Rehab    Physical Therapy Evaluation    Patient Name: Rocio Arriaga  MRN: [id number]  YOB: 1970  Encounter Date: 5/7/2025    Therapy Diagnosis:   Encounter Diagnoses   Name Primary?    Anterolisthesis of lumbar spine     Chronic bilateral low back pain without sciatica Yes     Physician: Carleen Packer MD    Physician Orders: Eval and Treat  Medical Diagnosis: Anterolisthesis of lumbar spine    Visit # / Visits Authorized:  1 / 1  Insurance Authorization Period: 4/15/2025 to 4/15/2026  Date of Evaluation: 5/7/2025  Plan of Care Certification: 5/7/2025 to 6/12/2025     Time In: 1430   Time Out: 1510  Total Time (in minutes): 40   Total Billable Time (in minutes): 40    Intake Outcome Measure for FOTO Survey    Therapist reviewed FOTO scores for Rocio Arriaga on 5/7/2025.   FOTO report - see Media section or FOTO account episode details.     Intake Score: 62%         Subjective   History of Present Illness  Rocio is a 54 y.o. female          Diagnostic tests related to this condition: MRI studies and X-ray.        History of Present Condition/Illness: Patient is 53 y/o WF who presents with low back pain. She is well-known to our clinic and was treated previously for similar symptoms. She has not been doing her home exercise program that she was given from her last session. She does not want to do injections at this point.     Pain     Patient reports a current pain level of 0/10. Pain at best is reported as 0/10. Pain at worst is reported as 7/10.   Location: low back  Clinical Progression (since onset): Stable  Pain Qualities: Aching  Pain-Relieving Factors: Rest  Pain-Aggravating Factors: Standing, Walking         Treatment History  Treatments  Previously Received Treatments: Yes  Previous Treatments: Physical therapy    Living Arrangements  Living Situation  Living Arrangements: Family members        Employment  Employment Status: Employed part-time   [employer] sales      Past Medical  History/Physical Systems Review:   Rocio Arriaga  has a past medical history of Anxiety, Depression, Fatigue, Headache, Hypertension, Psoriasis, Psoriatic arthritis, and Therapy.    Rocio Arriaga  has a past surgical history that includes hypertension.    Rocio has a current medication list which includes the following prescription(s): clobetasol, clotrimazole-betamethasone 1-0.05%, enbrel sureclick, gabapentin, mometasone 0.1%, mupirocin, propranolol, sertraline, and valsartan.    Review of patient's allergies indicates:   Allergen Reactions    Gluten protein         Objective   Posture    Flat lumbar spine is observed.                 Spinal Mobility  Hypermobile: Lumbosacral       Spinal Muscle Palpation  Right Spinal Muscle Palpation  Abnormal: Lumbar/Sacral  Right Lumbar/Sacral Muscle Palpation Observations: B PVM in lumbar spine               Hip Palpation  Right Hip Palpation  Abnormal: Lumbar/Sacral Muscle  Right Lumbar/Sacral Muscle Palpation Observations: B PVM in lumbar spine                    Lumbar Range of Motion   Active (deg) Passive (deg) Pain   Flexion 45   Yes   Extension 10       Right Lateral Flexion         Right Rotation 40       Left Lateral Flexion         Left Rotation 40                           Hip Strength - Planes of Motion   Right Strength Right Pain Left Strength Left  Pain   Flexion (L2) 4-   4-     Extension 4-   4-     ABduction 4-   4-     ADduction 4-   4-     Internal Rotation 4-   4-     External Rotation 4-   4-             Lumbar/Pelvic Girdle Special Tests  Lumbar Tests - Repeated  Positive: Extension       Lumbar Tests - SLR and Tension  Negative: Right Passive Straight Leg Raise and Left Passive Straight Leg Raise                 Pelvic Girdle / Sacrum Tests  Negative: Right RACHID, Left RACHID, Right FADIR, and Left FADIR         Hip Special Tests  Intra-Articular/Impingement Tests  Negative: Right RACHID, Left RACHID, Right FADIR, and Left FADIR               Treatment:  Modalities  Moist Heat (min): 5 min prior to treatment  Dry Needlin- 60 mm to L4 PVM with e-stim    Time Entry(in minutes):  PT Evaluation (Low) Time Entry: 20  Therapeutic Exercise Time Entry: 20    Assessment & Plan   Assessment  Rocio presents with a condition of Low complexity.   Presentation of Symptoms: Stable       Functional Limitations: Range of motion  Impairments: Pain with functional activity, Lack of appropriate home exercise program  Personal Factors Affecting Prognosis: Pain    Patient Goal for Therapy (PT): Patient to have decreased lower back pain.  Prognosis: Good  Prognosis Details: Patient prognosis is Good.  Patient will benefit from skilled outpatient Physical Therapy to address the deficits stated above and in the chart below, provide patient /family education, and to maximize patientt's level of independence.    Assessment Details: Rocio is a 54 y.o. female referred to outpatient Physical Therapy with a medical diagnosis of anterolisthesis of lumbar spine. Patient presents with decreased lumbar ROM, poor core stability, decreased hip strength and increased pain. Patient would benefit from skilled physical therapy to address the above mentioned deficits.      Plan  From a physical therapy perspective, the patient would benefit from: Skilled Rehab Services    Planned therapy interventions include: Therapeutic exercise, Therapeutic activities, Neuromuscular re-education, and Manual therapy.    Planned modalities to include: Cryotherapy (cold pack), Electrical stimulation - attended, and Thermotherapy (hot pack).        Visit Frequency: 1 times Per Week for 4 Weeks.       This plan was discussed with Patient.   Discussion participants: Agreed Upon Plan of Care  Plan details: Outpatient Physical Therapy 1 times weekly for 4 weeks to include the following interventions: Electrical Stimulation , Manual Therapy, Moist Heat/ Ice, Neuromuscular Re-ed, Patient Education,  Therapeutic Activities, and Therapeutic Exercise           Patient's spiritual, cultural, and educational needs considered and patient agreeable to plan of care and goals.     Education  Education was done with Patient. The patient's learning style includes Demonstration. The patient Demonstrates understanding.                 Goals:   Active       LTG       Patient to decrease pain to 2/10 at worst.        Start:  05/08/25    Expected End:  06/05/25            Patient to improve B hip strength to 4+/5.        Start:  05/08/25    Expected End:  06/05/25            Patient to improve lumbar ROM to WNL.        Start:  05/08/25    Expected End:  06/05/25               STG       Patient to be I with home exercise program.        Start:  05/08/25    Expected End:  05/22/25            Patient to decrease pain by 2 points.        Start:  05/08/25    Expected End:  05/22/25                Felisa Jimenes, PT

## 2025-07-03 DIAGNOSIS — F41.1 GAD (GENERALIZED ANXIETY DISORDER): ICD-10-CM

## 2025-07-03 RX ORDER — PROPRANOLOL HYDROCHLORIDE 10 MG/1
10 TABLET ORAL 2 TIMES DAILY PRN
Qty: 180 TABLET | Refills: 0 | OUTPATIENT
Start: 2025-07-03

## 2025-07-10 ENCOUNTER — PATIENT MESSAGE (OUTPATIENT)
Dept: FAMILY MEDICINE | Facility: CLINIC | Age: 55
End: 2025-07-10
Payer: MEDICAID

## 2025-07-25 ENCOUNTER — CLINICAL SUPPORT (OUTPATIENT)
Dept: FAMILY MEDICINE | Facility: CLINIC | Age: 55
End: 2025-07-25
Payer: MEDICAID

## 2025-07-25 ENCOUNTER — OFFICE VISIT (OUTPATIENT)
Dept: FAMILY MEDICINE | Facility: CLINIC | Age: 55
End: 2025-07-25
Payer: MEDICAID

## 2025-07-25 VITALS
HEIGHT: 65 IN | DIASTOLIC BLOOD PRESSURE: 70 MMHG | OXYGEN SATURATION: 99 % | RESPIRATION RATE: 16 BRPM | HEART RATE: 92 BPM | SYSTOLIC BLOOD PRESSURE: 116 MMHG | WEIGHT: 235 LBS | TEMPERATURE: 97 F | BODY MASS INDEX: 39.15 KG/M2

## 2025-07-25 DIAGNOSIS — R35.0 URINE FREQUENCY: Primary | ICD-10-CM

## 2025-07-25 DIAGNOSIS — Z01.419 WELL WOMAN EXAM WITH ROUTINE GYNECOLOGICAL EXAM: ICD-10-CM

## 2025-07-25 DIAGNOSIS — N30.00 ACUTE CYSTITIS WITHOUT HEMATURIA: ICD-10-CM

## 2025-07-25 LAB
BACTERIA SPEC CULT: NORMAL /HPF
BILIRUB SERPL-MCNC: NORMAL MG/DL
BLOOD, POC UA: NORMAL
CASTS: NORMAL
CRYSTALS: NORMAL
GLUCOSE UR QL STRIP: NORMAL
KETONES UR QL STRIP: NORMAL
LEUKOCYTE ESTERASE URINE, POC: NORMAL
NITRITE, POC UA: NORMAL
PH, POC UA: 5.5
PROTEIN, POC: NORMAL
RBC CELLS COUNTED: NORMAL
SPECIFIC GRAVITY, POC UA: <=1.005
UROBILINOGEN, POC UA: 0.2
WHITE BLOOD CELLS: NORMAL

## 2025-07-25 PROCEDURE — 99999 PR PBB SHADOW E&M-EST. PATIENT-LVL V: CPT | Mod: PBBFAC,,, | Performed by: NURSE PRACTITIONER

## 2025-07-25 PROCEDURE — 99215 OFFICE O/P EST HI 40 MIN: CPT | Mod: PBBFAC | Performed by: NURSE PRACTITIONER

## 2025-07-25 PROCEDURE — 87086 URINE CULTURE/COLONY COUNT: CPT

## 2025-07-25 RX ORDER — LIDOCAINE HYDROCHLORIDE 10 MG/ML
2.1 INJECTION, SOLUTION INFILTRATION; PERINEURAL
Status: COMPLETED | OUTPATIENT
Start: 2025-07-25 | End: 2025-07-25

## 2025-07-25 RX ORDER — CEFTRIAXONE 1 G/1
1 INJECTION, POWDER, FOR SOLUTION INTRAMUSCULAR; INTRAVENOUS ONCE
Status: COMPLETED | OUTPATIENT
Start: 2025-07-25 | End: 2025-07-25

## 2025-07-25 RX ADMIN — LIDOCAINE HYDROCHLORIDE 2.1 ML: 10 INJECTION, SOLUTION INFILTRATION; PERINEURAL at 11:07

## 2025-07-25 RX ADMIN — CEFTRIAXONE 1 G: 1 INJECTION, POWDER, FOR SOLUTION INTRAMUSCULAR; INTRAVENOUS at 11:07

## 2025-07-25 NOTE — PROGRESS NOTES
Subjective:       Patient ID: Rocio Arriaga is a 55 y.o. female.    Chief Complaint: Abdominal Pain (Lower abdominal pain since Monday), Fever, Urinary Frequency (Has had some frequency over the last few days ), and Back Pain    Here with lower abdominal pain and had fever Tuesday night.     Abdominal Pain  The current episode started in the past 7 days. The pain is located in the suprapubic region. The pain is at a severity of 5/10. The quality of the pain is aching. Associated symptoms include a fever (resolved) and frequency. Pertinent negatives include no arthralgias, diarrhea, headaches, myalgias, nausea or vomiting.   Fever   The problem has been resolved. Associated symptoms include abdominal pain (suprapubic). Pertinent negatives include no congestion, coughing, diarrhea, ear pain, headaches, nausea, sore throat, vomiting or wheezing.   Urinary Frequency   Associated symptoms include frequency. Pertinent negatives include no nausea, urgency or vomiting.   Back Pain  Associated symptoms include abdominal pain (suprapubic) and a fever (resolved). Pertinent negatives include no headaches.     Review of Systems   Constitutional:  Positive for fever (resolved). Negative for appetite change and fatigue.   HENT: Negative.  Negative for congestion, ear pain and sore throat.    Eyes: Negative.  Negative for visual disturbance.   Respiratory: Negative.  Negative for cough, shortness of breath and wheezing.    Cardiovascular: Negative.    Gastrointestinal:  Positive for abdominal pain (suprapubic). Negative for diarrhea, nausea and vomiting.   Endocrine: Negative.    Genitourinary:  Positive for frequency. Negative for difficulty urinating and urgency.   Musculoskeletal:  Positive for back pain (lower back pain - more consistent since abdominal pain started). Negative for arthralgias and myalgias.   Skin: Negative.  Negative for color change.   Allergic/Immunologic: Negative.    Neurological: Negative.  Negative for  dizziness and headaches.   Hematological: Negative.    Psychiatric/Behavioral: Negative.  Negative for sleep disturbance.    All other systems reviewed and are negative.      Objective:      Physical Exam  Vitals and nursing note reviewed.   Constitutional:       General: She is not in acute distress.     Appearance: Normal appearance. She is well-developed.   HENT:      Head: Normocephalic and atraumatic.   Eyes:      Pupils: Pupils are equal, round, and reactive to light.   Cardiovascular:      Rate and Rhythm: Normal rate and regular rhythm.      Pulses: Normal pulses.      Heart sounds: Normal heart sounds. No murmur heard.  Pulmonary:      Effort: Pulmonary effort is normal. No respiratory distress.      Breath sounds: Normal breath sounds.   Abdominal:      General: Bowel sounds are normal.      Palpations: Abdomen is soft.      Tenderness: There is abdominal tenderness (over the bladder). There is no right CVA tenderness or left CVA tenderness.   Musculoskeletal:         General: Normal range of motion.      Cervical back: Normal range of motion and neck supple.   Skin:     General: Skin is warm and dry.   Neurological:      Mental Status: She is alert and oriented to person, place, and time.   Psychiatric:         Mood and Affect: Mood normal.         Assessment:       1. Urine frequency    2. Acute cystitis without hematuria    3. Well woman exam with routine gynecological exam        Plan:     1. Urine frequency  -     POCT URINALYSIS  -     POCT Urine Sediment Exam    2. Acute cystitis without hematuria  -     cefTRIAXone injection 1 g  -     LIDOcaine HCL 10 mg/ml (1%) injection 2.1 mL    3. Well woman exam with routine gynecological exam  -     Ambulatory referral/consult to Obstetrics / Gynecology; Future; Expected date: 08/01/2025    Follow up with Dr. Gallagher

## 2025-07-28 ENCOUNTER — PATIENT MESSAGE (OUTPATIENT)
Dept: FAMILY MEDICINE | Facility: CLINIC | Age: 55
End: 2025-07-28
Payer: MEDICAID

## 2025-07-28 NOTE — TELEPHONE ENCOUNTER
Pt states that her symptoms have not changed asking for next steps even though she tested negative for uti

## 2025-08-13 ENCOUNTER — PATIENT MESSAGE (OUTPATIENT)
Dept: ADMINISTRATIVE | Facility: OTHER | Age: 55
End: 2025-08-13
Payer: MEDICAID

## 2025-08-26 ENCOUNTER — HOSPITAL ENCOUNTER (OUTPATIENT)
Dept: RADIOLOGY | Facility: HOSPITAL | Age: 55
Discharge: HOME | End: 2025-08-26
Attending: STUDENT IN AN ORGANIZED HEALTH CARE EDUCATION/TRAINING PROGRAM
Payer: MEDICAID

## 2025-08-26 VITALS — BODY MASS INDEX: 39.15 KG/M2 | HEIGHT: 65 IN | WEIGHT: 235 LBS

## 2025-08-26 DIAGNOSIS — Z12.31 ENCOUNTER FOR SCREENING MAMMOGRAM FOR BREAST CANCER: ICD-10-CM

## 2025-08-26 PROCEDURE — 77063 BREAST TOMOSYNTHESIS BI: CPT | Mod: 26,,, | Performed by: RADIOLOGY

## 2025-08-26 PROCEDURE — 77067 SCR MAMMO BI INCL CAD: CPT | Mod: 26,,, | Performed by: RADIOLOGY

## 2025-08-26 PROCEDURE — 77063 BREAST TOMOSYNTHESIS BI: CPT | Mod: TC

## 2025-08-28 ENCOUNTER — LAB VISIT (OUTPATIENT)
Dept: LAB | Facility: HOSPITAL | Age: 55
End: 2025-08-28
Attending: INTERNAL MEDICINE
Payer: MEDICAID

## 2025-08-28 DIAGNOSIS — R76.8 POSITIVE ANA (ANTINUCLEAR ANTIBODY): ICD-10-CM

## 2025-08-28 LAB
ABSOLUTE EOSINOPHIL (OHS): 0.17 K/UL
ABSOLUTE MONOCYTE (OHS): 0.31 K/UL (ref 0.3–1)
ABSOLUTE NEUTROPHIL COUNT (OHS): 3.46 K/UL (ref 1.8–7.7)
ALBUMIN SERPL BCP-MCNC: 3.8 G/DL (ref 3.5–5.2)
ALP SERPL-CCNC: 90 UNIT/L (ref 40–150)
ALT SERPL W/O P-5'-P-CCNC: 23 UNIT/L (ref 10–44)
ANION GAP (OHS): 10 MMOL/L (ref 8–16)
AST SERPL-CCNC: 34 UNIT/L (ref 11–45)
BACTERIA #/AREA URNS HPF: ABNORMAL /HPF
BASOPHILS # BLD AUTO: 0.04 K/UL
BASOPHILS NFR BLD AUTO: 0.7 %
BILIRUB SERPL-MCNC: 0.4 MG/DL (ref 0.1–1)
BILIRUB UR QL STRIP.AUTO: NEGATIVE
BUN SERPL-MCNC: 15 MG/DL (ref 6–20)
C3 SERPL-MCNC: 175 MG/DL (ref 50–180)
C4 COMPLEMENT (OHS): 25 MG/DL (ref 11–44)
CALCIUM SERPL-MCNC: 9.1 MG/DL (ref 8.7–10.5)
CHLORIDE SERPL-SCNC: 105 MMOL/L (ref 95–110)
CLARITY UR: CLEAR
CO2 SERPL-SCNC: 25 MMOL/L (ref 23–29)
COLOR UR AUTO: YELLOW
CREAT SERPL-MCNC: 0.9 MG/DL (ref 0.5–1.4)
CRP SERPL-MCNC: 16.9 MG/L
ERYTHROCYTE [DISTWIDTH] IN BLOOD BY AUTOMATED COUNT: 14.1 % (ref 11.5–14.5)
ERYTHROCYTE [SEDIMENTATION RATE] IN BLOOD BY PHOTOMETRIC METHOD: 70 MM/HR
GFR SERPLBLD CREATININE-BSD FMLA CKD-EPI: >60 ML/MIN/1.73/M2
GLUCOSE SERPL-MCNC: 177 MG/DL (ref 70–110)
GLUCOSE UR QL STRIP: NEGATIVE
HCT VFR BLD AUTO: 38 % (ref 37–48.5)
HGB BLD-MCNC: 12.5 GM/DL (ref 12–16)
HGB UR QL STRIP: ABNORMAL
IMM GRANULOCYTES # BLD AUTO: 0.04 K/UL (ref 0–0.04)
IMM GRANULOCYTES NFR BLD AUTO: 0.7 % (ref 0–0.5)
KETONES UR QL STRIP: NEGATIVE
LEUKOCYTE ESTERASE UR QL STRIP: NEGATIVE
LYMPHOCYTES # BLD AUTO: 1.54 K/UL (ref 1–4.8)
MCH RBC QN AUTO: 29.3 PG (ref 27–31)
MCHC RBC AUTO-ENTMCNC: 32.9 G/DL (ref 32–36)
MCV RBC AUTO: 89 FL (ref 82–98)
MICROSCOPIC COMMENT: ABNORMAL
NITRITE UR QL STRIP: NEGATIVE
NUCLEATED RBC (/100WBC) (OHS): 0 /100 WBC
PH UR STRIP: 6 [PH]
PLATELET # BLD AUTO: 349 K/UL (ref 150–450)
PMV BLD AUTO: 9.9 FL (ref 9.2–12.9)
POTASSIUM SERPL-SCNC: 4.2 MMOL/L (ref 3.5–5.1)
PROT SERPL-MCNC: 8 GM/DL (ref 6–8.4)
PROT UR QL STRIP: NEGATIVE
RBC # BLD AUTO: 4.27 M/UL (ref 4–5.4)
RBC #/AREA URNS HPF: 5 /HPF (ref 0–4)
RELATIVE EOSINOPHIL (OHS): 3.1 %
RELATIVE LYMPHOCYTE (OHS): 27.7 % (ref 18–48)
RELATIVE MONOCYTE (OHS): 5.6 % (ref 4–15)
RELATIVE NEUTROPHIL (OHS): 62.2 % (ref 38–73)
SODIUM SERPL-SCNC: 140 MMOL/L (ref 136–145)
SP GR UR STRIP: 1.02
SQUAMOUS #/AREA URNS HPF: 15 /HPF
UROBILINOGEN UR STRIP-ACNC: NEGATIVE EU/DL
WBC # BLD AUTO: 5.56 K/UL (ref 3.9–12.7)

## 2025-08-28 PROCEDURE — 85025 COMPLETE CBC W/AUTO DIFF WBC: CPT

## 2025-08-28 PROCEDURE — 86160 COMPLEMENT ANTIGEN: CPT

## 2025-08-28 PROCEDURE — 85652 RBC SED RATE AUTOMATED: CPT

## 2025-08-28 PROCEDURE — 36415 COLL VENOUS BLD VENIPUNCTURE: CPT

## 2025-08-28 PROCEDURE — 80053 COMPREHEN METABOLIC PANEL: CPT

## 2025-08-28 PROCEDURE — 86160 COMPLEMENT ANTIGEN: CPT | Mod: 59

## 2025-08-28 PROCEDURE — 81003 URINALYSIS AUTO W/O SCOPE: CPT

## 2025-08-28 PROCEDURE — 86225 DNA ANTIBODY NATIVE: CPT

## 2025-08-28 PROCEDURE — 86140 C-REACTIVE PROTEIN: CPT

## 2025-08-29 LAB
DSDNA ANTIBODY (OHS): NORMAL
DSDNA ANTIBODY TITER (OHS): NORMAL